# Patient Record
Sex: MALE | Race: BLACK OR AFRICAN AMERICAN | NOT HISPANIC OR LATINO | Employment: FULL TIME | URBAN - METROPOLITAN AREA
[De-identification: names, ages, dates, MRNs, and addresses within clinical notes are randomized per-mention and may not be internally consistent; named-entity substitution may affect disease eponyms.]

---

## 2017-04-10 ENCOUNTER — OFFICE VISIT (OUTPATIENT)
Dept: INTERNAL MEDICINE | Facility: CLINIC | Age: 52
End: 2017-04-10
Attending: FAMILY MEDICINE
Payer: COMMERCIAL

## 2017-04-10 ENCOUNTER — LAB VISIT (OUTPATIENT)
Dept: LAB | Facility: OTHER | Age: 52
End: 2017-04-10
Attending: FAMILY MEDICINE
Payer: COMMERCIAL

## 2017-04-10 VITALS
HEIGHT: 68 IN | SYSTOLIC BLOOD PRESSURE: 139 MMHG | DIASTOLIC BLOOD PRESSURE: 102 MMHG | BODY MASS INDEX: 35.59 KG/M2 | WEIGHT: 234.81 LBS | HEART RATE: 80 BPM

## 2017-04-10 DIAGNOSIS — E78.2 MIXED HYPERLIPIDEMIA: ICD-10-CM

## 2017-04-10 DIAGNOSIS — Z00.00 PREVENTATIVE HEALTH CARE: Primary | ICD-10-CM

## 2017-04-10 DIAGNOSIS — I10 HTN (HYPERTENSION), BENIGN: ICD-10-CM

## 2017-04-10 DIAGNOSIS — E11.9 TYPE 2 DIABETES MELLITUS WITHOUT COMPLICATION, WITHOUT LONG-TERM CURRENT USE OF INSULIN: ICD-10-CM

## 2017-04-10 LAB
ALBUMIN SERPL BCP-MCNC: 3.9 G/DL
ALP SERPL-CCNC: 77 U/L
ALT SERPL W/O P-5'-P-CCNC: 29 U/L
ANION GAP SERPL CALC-SCNC: 8 MMOL/L
AST SERPL-CCNC: 21 U/L
BILIRUB SERPL-MCNC: 0.7 MG/DL
BUN SERPL-MCNC: 10 MG/DL
CALCIUM SERPL-MCNC: 9.2 MG/DL
CHLORIDE SERPL-SCNC: 104 MMOL/L
CHOLEST/HDLC SERPL: 4.8 {RATIO}
CO2 SERPL-SCNC: 30 MMOL/L
CREAT SERPL-MCNC: 1.5 MG/DL
EST. GFR  (AFRICAN AMERICAN): >60 ML/MIN/1.73 M^2
EST. GFR  (NON AFRICAN AMERICAN): 54 ML/MIN/1.73 M^2
GLUCOSE SERPL-MCNC: 97 MG/DL
HDL/CHOLESTEROL RATIO: 20.7 %
HDLC SERPL-MCNC: 135 MG/DL
HDLC SERPL-MCNC: 28 MG/DL
LDLC SERPL CALC-MCNC: 59 MG/DL
NONHDLC SERPL-MCNC: 107 MG/DL
POTASSIUM SERPL-SCNC: 3.6 MMOL/L
PROT SERPL-MCNC: 7.6 G/DL
SODIUM SERPL-SCNC: 142 MMOL/L
TRIGL SERPL-MCNC: 240 MG/DL

## 2017-04-10 PROCEDURE — 36415 COLL VENOUS BLD VENIPUNCTURE: CPT

## 2017-04-10 PROCEDURE — 3080F DIAST BP >= 90 MM HG: CPT | Mod: S$GLB,,, | Performed by: FAMILY MEDICINE

## 2017-04-10 PROCEDURE — 99396 PREV VISIT EST AGE 40-64: CPT | Mod: S$GLB,,, | Performed by: FAMILY MEDICINE

## 2017-04-10 PROCEDURE — 80061 LIPID PANEL: CPT

## 2017-04-10 PROCEDURE — 83036 HEMOGLOBIN GLYCOSYLATED A1C: CPT

## 2017-04-10 PROCEDURE — 80053 COMPREHEN METABOLIC PANEL: CPT

## 2017-04-10 PROCEDURE — 99999 PR PBB SHADOW E&M-EST. PATIENT-LVL III: CPT | Mod: PBBFAC,,, | Performed by: FAMILY MEDICINE

## 2017-04-10 PROCEDURE — 3075F SYST BP GE 130 - 139MM HG: CPT | Mod: S$GLB,,, | Performed by: FAMILY MEDICINE

## 2017-04-10 RX ORDER — ATORVASTATIN CALCIUM 40 MG/1
40 TABLET, FILM COATED ORAL DAILY
Qty: 90 TABLET | Refills: 3 | Status: SHIPPED | OUTPATIENT
Start: 2017-04-10 | End: 2017-11-08 | Stop reason: SDUPTHER

## 2017-04-10 RX ORDER — ATORVASTATIN CALCIUM 40 MG/1
TABLET, FILM COATED ORAL
COMMUNITY
Start: 2017-03-13 | End: 2017-04-10 | Stop reason: SDUPTHER

## 2017-04-10 NOTE — PROGRESS NOTES
"CHIEF COMPLAINT: followup hypertension, diabetes, and hypercholesterolemia.     HISTORY OF PRESENT ILLNESS: The patient is a 51 year-old black male. He was seen 6 months ago.        He started on Lipitor and metformin which he is tolerating well.      Unfortunately, his blood pressure remained an issue.  He is on ramipril amlodipine and HCTZ.  Blood pressure is fairly well controlled today.      He was having an ACE inhibitor cough that resolved upon stopping the lisinopril.     REVIEW OF SYSTEMS:   GENERAL: No fever, chills, fatigability or weight loss.   SKIN: No rashes, itching or changes in color or texture of skin.   HEAD: No headaches or recent head trauma.   EYES: Visual acuity fine. No photophobia, ocular pain or diplopia.   EARS: Denies ear pain, discharge.   NOSE: No loss of smell, no epistaxis or postnasal drip.   MOUTH & THROAT: No hoarseness or change in voice. No excessive gum bleeding.   NODES: Denies swollen glands.   CHEST: Denies GUARDADO, cyanosis, wheezing, and sputum production.   CARDIOVASCULAR: Denies chest pain, PND, orthopnea or reduced exercise tolerance.   ABDOMEN: Appetite fine. No weight loss. Denies diarrhea, abdominal pain, hematemesis or blood in stool.   URINARY: No flank pain, dysuria or hematuria.   PERIPHERAL VASCULAR: No claudication or cyanosis.   MUSCULOSKELETAL: No joint stiffness or swelling. Denies back pain.   NEUROLOGIC: No history of seizures, paralysis, alteration of gait or coordination.     SOCIAL HISTORY: The patient does not smoke. The patient consumes alcohol socially. The patient is . 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     PHYSICAL EXAMINATION:   Blood pressure (!) 139/102, pulse 80, height 5' 8" (1.727 m), weight 106.5 kg (234 lb 12.6 oz).    APPEARANCE: Well nourished, well developed, in no acute distress.   HEAD: Normocephalic, atraumatic.   EYES: PERRL. EOMI. Conjunctivae without injection and anicteric   EARS: TM's intact. Light reflex normal. No " retraction or perforation.   NOSE: Mucosa pink. Airway clear.   MOUTH & THROAT: No tonsillar enlargement. No pharyngeal erythema or exudate. No stridor.   NECK: Supple.   NODES: No cervical, axillary or inguinal lymph node enlargement.   CHEST: Lungs clear to auscultation. No retractions are noted. No rales or rhonchi are present.   CARDIOVASCULAR: Normal S1, S2. No rubs, murmurs or gallops.   ABDOMEN: Bowel sounds normal. Not distended. Soft. No tenderness or masses. No ascites is noted.   MUSCULOSKELETAL: There is no clubbing, cyanosis, or edema of the extremities x4. There is full range of motion of the lumbar spine. There is full range of motion of the extremities x4. There is no deformity noted.   NEUROLOGIC:   Normal speech development.   Hearing normal.   Normal gait.   Motor and sensory exams grossly normal.   DTR's normal.   PSYCHIATRIC: Patient is alert and oriented x3. Thought processes are all normal. There is no homicidality. There is no suicidality. There is no evidence of psychosis.   Protective Sensation (w/ 10 gram monofilament):  Right: Intact  Left: Intact    Visual Inspection:  Normal -  Bilateral    Pedal Pulses:   Right: Present  Left: Present    Posterior tibialis:   Right:Present  Left: Present        LABORATORY/RADIOLOGY: Chart reviewed, including ER notes and laboratory values.     ASSESSMENT:   Hypertension now adequately controlled    Type 2 diabetes, condition is well controlled on current medication regimen   Hyperlipidemia, condition is well controlled on current medication regimen  Suspect ACE inhibitor cough   Leg cramps and hypokalemia     PLAN:   Continue current antihypertensives regimen.     Continue losartan 100 mg by mouth daily      I am going to see him back in 6 months with BW prior

## 2017-04-10 NOTE — MR AVS SNAPSHOT
[facility]  [address]  [facility] [address]  Phone: [phone]  Fax: [phone]                  Meliton Tyson   4/10/2017 4:00 PM   Office Visit    Description:  Male : 1965   Provider:  Rodrick Angulo MD   Department:  [facility]           Reason for Visit     Hypertension           Diagnoses this Visit        Comments    Preventative health care    -  Primary     HTN (hypertension), benign         Type 2 diabetes mellitus without complication, without long-term current use of insulin         Mixed hyperlipidemia                To Do List           Future Appointments        Provider Department Dept Phone    4/10/2017 2:45 PM LAB, SAME DAY [facility] [phone]    4/10/2017 4:00 PM Rodrick Angulo MD [facility] [phone]      Goals (5 Years of Data)     None       These Medications        Disp Refills Start End    atorvastatin (LIPITOR) 40 MG tablet 90 tablet 3 4/10/2017     Take 1 tablet (40 mg total) by mouth once daily. - Oral    Pharmacy: [id number] [place] - [address] Ph #: [phone]         [facility]     [facility] Nurse Care Line -  Assistance  Unless otherwise directed by your provider, please contact [facility], our nurse care line that is available for  assistance.     Registered nurses in the [facility] provide: appointment scheduling, clinical advisement, health education, and other advisory services.  Call: [phone] (toll free)               Medications           Message regarding Medications     Verify the changes and/or additions to your medication regime listed below are the same as discussed with your clinician today.  If any of these changes or additions are incorrect, please notify your healthcare provider.        CHANGE how you are taking these medications     Start Taking  "Instead of    atorvastatin (LIPITOR) 40 MG tablet atorvastatin (LIPITOR) 40 MG tablet    Dosage:  Take 1 tablet (40 mg total) by mouth once daily.     Reason for Change:  Reorder       STOP taking these medications     promethazine-dextromethorphan (PROMETHAZINE-DM) 6.25-15 mg/5 mL Syrp Take 5 mLs by mouth once daily.           Verify that the below list of medications is an accurate representation of the medications you are currently taking.  If none reported, the list may be blank. If incorrect, please contact your healthcare provider. Carry this list with you in case of emergency.           Current Medications     amlodipine (NORVASC) 10 MG tablet Take 1 tablet (10 mg total) by mouth once daily.    atorvastatin (LIPITOR) 40 MG tablet Take 1 tablet (40 mg total) by mouth once daily.    hydrochlorothiazide (HYDRODIURIL) 25 MG tablet Take 1 tablet (25 mg total) by mouth once daily.    losartan (COZAAR) 100 MG tablet Take 1 tablet (100 mg total) by mouth once daily.    metformin (GLUCOPHAGE) 500 MG tablet Take 1 tablet (500 mg total) by mouth 2 (two) times daily with meals.    tadalafil (CIALIS) 20 MG Tab Use one tablet every 36 hours           Clinical Reference Information           Your Vitals Were     BP Pulse Height Weight BMI    139/102 80 5' 8" (1.727 m) 106.5 kg (234 lb 12.6 oz) 35.7 kg/m2      Blood Pressure          Most Recent Value    BP  (!)  139/102      Allergies as of 4/10/2017     No Known Allergies      Immunizations Administered on Date of Encounter - 4/10/2017     None      Orders Placed During Today's Visit     Future Labs/Procedures Expected by Expires    Comprehensive metabolic panel  4/10/2017 6/9/2017    Hemoglobin A1c  4/10/2017 7/9/2017    Lipid panel  4/10/2017 6/9/2017      Instructions    Your test results will be communicated to you via: [facility], Telephone or Letter.  If you have not received your test results within one week. Please contact the clinic.           Language Assistance " Services     ATTENTION: Language assistance services are available, free of charge. Please call [phone].      ATENCIÓN: Si habla moo, tiene a hinojosa disposición servicios gratuitos de asistencia lingüística. Llame al [phone].     CHÚ Ý: N?u b?n nói Ti?ng Vi?t, có các d?ch v? h? tr? ngôn ng? mi?n phí dành cho b?n. G?i s? [phone].         [facility] complies with applicable Federal civil rights laws and does not discriminate on the basis of race, color, national origin, age, disability, or sex.

## 2017-04-11 LAB
ESTIMATED AVG GLUCOSE: 180 MG/DL
HBA1C MFR BLD HPLC: 7.9 %

## 2017-04-12 ENCOUNTER — TELEPHONE (OUTPATIENT)
Dept: INTERNAL MEDICINE | Facility: CLINIC | Age: 52
End: 2017-04-12

## 2017-04-12 NOTE — TELEPHONE ENCOUNTER
----- Message from Rodrick Angulo MD sent at 4/12/2017 12:35 PM CDT -----  A1c remains good.  Cholesterol is very good.  No changes in medications.  Followup as scheduled.

## 2017-09-08 DIAGNOSIS — Z12.11 COLON CANCER SCREENING: ICD-10-CM

## 2017-09-08 RX ORDER — METFORMIN HYDROCHLORIDE 500 MG/1
TABLET ORAL
Qty: 60 TABLET | Refills: 0 | Status: SHIPPED | OUTPATIENT
Start: 2017-09-08 | End: 2017-11-08 | Stop reason: SDUPTHER

## 2017-09-08 NOTE — TELEPHONE ENCOUNTER
----- Message from Elaine Pool sent at 9/8/2017 10:32 AM CDT -----  Contact: Patient himself  _  1st Request  X  2nd Request  _  3rd Request    Please refill the medication(s) listed below. Please call the patient when the prescription(s) is ready for  at the phone number [phone] .    Medication #1  METFORMIN  500 mg    Preferred Pharmacy: [id number]  [place] - [address]# [phone]  /  Fax# [phone]

## 2017-10-06 DIAGNOSIS — E11.9 TYPE 2 DIABETES MELLITUS WITHOUT COMPLICATION: ICD-10-CM

## 2017-10-24 ENCOUNTER — TELEPHONE (OUTPATIENT)
Dept: INTERNAL MEDICINE | Facility: CLINIC | Age: 52
End: 2017-10-24

## 2017-10-24 DIAGNOSIS — E11.9 TYPE 2 DIABETES MELLITUS WITHOUT COMPLICATION, WITHOUT LONG-TERM CURRENT USE OF INSULIN: Primary | ICD-10-CM

## 2017-10-26 RX ORDER — HYDROCHLOROTHIAZIDE 25 MG/1
TABLET ORAL
Qty: 90 TABLET | Refills: 0 | Status: SHIPPED | OUTPATIENT
Start: 2017-10-26 | End: 2017-11-08 | Stop reason: SDUPTHER

## 2017-10-26 RX ORDER — AMLODIPINE BESYLATE 10 MG/1
TABLET ORAL
Qty: 90 TABLET | Refills: 0 | Status: SHIPPED | OUTPATIENT
Start: 2017-10-26 | End: 2017-11-08 | Stop reason: SDUPTHER

## 2017-11-08 ENCOUNTER — LAB VISIT (OUTPATIENT)
Dept: LAB | Facility: OTHER | Age: 52
End: 2017-11-08
Attending: FAMILY MEDICINE
Payer: COMMERCIAL

## 2017-11-08 ENCOUNTER — OFFICE VISIT (OUTPATIENT)
Dept: INTERNAL MEDICINE | Facility: CLINIC | Age: 52
End: 2017-11-08
Attending: FAMILY MEDICINE
Payer: COMMERCIAL

## 2017-11-08 VITALS
BODY MASS INDEX: 36.72 KG/M2 | HEART RATE: 93 BPM | WEIGHT: 242.31 LBS | DIASTOLIC BLOOD PRESSURE: 70 MMHG | HEIGHT: 68 IN | SYSTOLIC BLOOD PRESSURE: 130 MMHG

## 2017-11-08 DIAGNOSIS — E11.9 DIABETES MELLITUS WITHOUT COMPLICATION: Primary | ICD-10-CM

## 2017-11-08 DIAGNOSIS — Z12.11 SCREEN FOR COLON CANCER: ICD-10-CM

## 2017-11-08 DIAGNOSIS — R35.0 FREQUENT URINATION: ICD-10-CM

## 2017-11-08 DIAGNOSIS — E11.9 DIABETES MELLITUS WITHOUT COMPLICATION: ICD-10-CM

## 2017-11-08 DIAGNOSIS — E11.9 TYPE 2 DIABETES MELLITUS WITHOUT COMPLICATION: ICD-10-CM

## 2017-11-08 DIAGNOSIS — I10 HTN (HYPERTENSION), BENIGN: ICD-10-CM

## 2017-11-08 DIAGNOSIS — E78.2 MIXED HYPERLIPIDEMIA: ICD-10-CM

## 2017-11-08 LAB
ESTIMATED AVG GLUCOSE: 280 MG/DL
HBA1C MFR BLD HPLC: 11.4 %

## 2017-11-08 PROCEDURE — 99214 OFFICE O/P EST MOD 30 MIN: CPT | Mod: S$GLB,,, | Performed by: FAMILY MEDICINE

## 2017-11-08 PROCEDURE — 83036 HEMOGLOBIN GLYCOSYLATED A1C: CPT

## 2017-11-08 PROCEDURE — 99999 PR PBB SHADOW E&M-EST. PATIENT-LVL III: CPT | Mod: PBBFAC,,, | Performed by: FAMILY MEDICINE

## 2017-11-08 PROCEDURE — 36415 COLL VENOUS BLD VENIPUNCTURE: CPT

## 2017-11-08 RX ORDER — ATORVASTATIN CALCIUM 40 MG/1
40 TABLET, FILM COATED ORAL DAILY
Qty: 90 TABLET | Refills: 1 | Status: SHIPPED | OUTPATIENT
Start: 2017-11-08 | End: 2018-06-04 | Stop reason: SDUPTHER

## 2017-11-08 RX ORDER — METFORMIN HYDROCHLORIDE 500 MG/1
500 TABLET ORAL 2 TIMES DAILY WITH MEALS
Qty: 180 TABLET | Refills: 1 | Status: SHIPPED | OUTPATIENT
Start: 2017-11-08 | End: 2019-02-02 | Stop reason: SDUPTHER

## 2017-11-08 RX ORDER — AMLODIPINE BESYLATE 10 MG/1
10 TABLET ORAL DAILY
Qty: 90 TABLET | Refills: 1 | Status: SHIPPED | OUTPATIENT
Start: 2017-11-08 | End: 2018-09-27 | Stop reason: SDUPTHER

## 2017-11-08 RX ORDER — HYDROCHLOROTHIAZIDE 25 MG/1
25 TABLET ORAL DAILY
Qty: 90 TABLET | Refills: 1 | Status: SHIPPED | OUTPATIENT
Start: 2017-11-08 | End: 2019-07-09 | Stop reason: SDUPTHER

## 2017-11-08 NOTE — PROGRESS NOTES
CHIEF COMPLAINT: followup hypertension, diabetes, and hypercholesterolemia.     HISTORY OF PRESENT ILLNESS: The patient is a 52 year-old black male. He was seen 6 months ago.    Interestingly, he has recently begun drinking between 1-1/2 and 2 gallons of water a day.  Needless to say his frequency of urination has increased.  He is also getting up several times at night to urinate.  He says that his volume of urine per [place] is about the same.  He does not have any urgency.  He does not have any hesitancy.  It sounds like he is simply drinking too much water.  He is going to reduce his water consumption and see if this doesn't help        He started on Lipitor and metformin which he is tolerating well.      He is on ramipril amlodipine and HCTZ.  Blood pressure is well controlled today.      REVIEW OF SYSTEMS:   GENERAL: No fever, chills, fatigability or weight loss.   SKIN: No rashes, itching or changes in color or texture of skin.   HEAD: No headaches or recent head trauma.   EYES: Visual acuity fine. No photophobia, ocular pain or diplopia.   EARS: Denies ear pain, discharge.   NOSE: No loss of smell, no epistaxis or postnasal drip.   MOUTH & THROAT: No hoarseness or change in voice. No excessive gum bleeding.   NODES: Denies swollen glands.   CHEST: Denies GUARDADO, cyanosis, wheezing, and sputum production.   CARDIOVASCULAR: Denies chest pain, PND, orthopnea or reduced exercise tolerance.   ABDOMEN: Appetite fine. No weight loss. Denies diarrhea, abdominal pain, hematemesis or blood in stool.   URINARY: No flank pain, dysuria or hematuria.   PERIPHERAL VASCULAR: No claudication or cyanosis.   MUSCULOSKELETAL: No joint stiffness or swelling. Denies back pain.   NEUROLOGIC: No history of seizures, paralysis, alteration of gait or coordination.     SOCIAL HISTORY: The patient does not smoke. The patient consumes alcohol socially. The patient is . 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     PHYSICAL  "EXAMINATION:   Blood pressure 130/70, pulse 93, height 5' 8" (1.727 m), weight 109.9 kg (242 lb 4.6 oz).    APPEARANCE: Well nourished, well developed, in no acute distress.   HEAD: Normocephalic, atraumatic.   EYES: PERRL. EOMI. Conjunctivae without injection and anicteric   EARS: TM's intact. Light reflex normal. No retraction or perforation.   NOSE: Mucosa pink. Airway clear.   MOUTH & THROAT: No tonsillar enlargement. No pharyngeal erythema or exudate. No stridor.   NECK: Supple.   NODES: No cervical, axillary or inguinal lymph node enlargement.   CHEST: Lungs clear to auscultation. No retractions are noted. No rales or rhonchi are present.   CARDIOVASCULAR: Normal S1, S2. No rubs, murmurs or gallops.   ABDOMEN: Bowel sounds normal. Not distended. Soft. No tenderness or masses. No ascites is noted.   MUSCULOSKELETAL: There is no clubbing, cyanosis, or edema of the extremities x4. There is full range of motion of the lumbar spine. There is full range of motion of the extremities x4. There is no deformity noted.   NEUROLOGIC:   Normal speech development.   Hearing normal.   Normal gait.   Motor and sensory exams grossly normal.   DTR's normal.   PSYCHIATRIC: Patient is alert and oriented x3. Thought processes are all normal. There is no homicidality. There is no suicidality. There is no evidence of psychosis.   Protective Sensation (w/ 10 gram monofilament):  Right: Intact  Left: Intact    Visual Inspection:  Normal -  Bilateral    Pedal Pulses:   Right: Present  Left: Present    Posterior tibialis:   Right:Present  Left: Present        LABORATORY/RADIOLOGY: Chart reviewed, including ER notes and laboratory values.     ASSESSMENT:   Frequent urination likely due to overly vigorous fluid intake   Hypertension now adequately controlled    Type 2 diabetes, condition is well controlled on current medication regimen   Hyperlipidemia, condition is well controlled on current medication regimen  ACE inhibitor cough "     PLAN:    Reduce fluid intake to approximately 1 gallon per day   Follow-up A1c     Continue current antihypertensives regimen.     Continue losartan 100 mg by mouth daily      I am going to see him back in 6 months with BW prior

## 2017-11-10 DIAGNOSIS — E11.9 TYPE 2 DIABETES MELLITUS WITHOUT COMPLICATION: ICD-10-CM

## 2017-11-16 ENCOUNTER — TELEPHONE (OUTPATIENT)
Dept: INTERNAL MEDICINE | Facility: CLINIC | Age: 52
End: 2017-11-16

## 2017-11-16 NOTE — TELEPHONE ENCOUNTER
----- Message from Rodrick Angulo MD sent at 11/10/2017 12:19 PM CST -----  A1c has increased to 11.4.  Emphasize medication and dietary adherence.  No changes in medications.  Followup as scheduled in 6 months.

## 2017-11-16 NOTE — TELEPHONE ENCOUNTER
Pt has been informed of results and advice.  States verbal understanding. Patient has no further questions or concerns.

## 2017-11-27 RX ORDER — LOSARTAN POTASSIUM 100 MG/1
TABLET ORAL
Qty: 90 TABLET | Refills: 3 | Status: SHIPPED | OUTPATIENT
Start: 2017-11-27 | End: 2019-07-09 | Stop reason: SDUPTHER

## 2018-01-10 ENCOUNTER — TELEPHONE (OUTPATIENT)
Dept: INTERNAL MEDICINE | Facility: CLINIC | Age: 53
End: 2018-01-10

## 2018-01-10 NOTE — TELEPHONE ENCOUNTER
----- Message from Elaine Pool sent at 1/10/2018 12:10 PM CST -----  Contact: Patient himself  X 1st Request  _  2nd Request  _  3rd Request    Who: Meliton Tyson (mrn# [id number])    Why: Patient called requesting to have medication TAMIFLU and cough syrup called into his local pharmacy for the flu.  Please give a call back call back at your earliest convenience.    THANKS!     What Number to Call Back:  [phone]    When to Expect a call back: (Before the end of the day)   -- if the call is after 12:00, the call back will be tomorrow.

## 2018-01-10 NOTE — TELEPHONE ENCOUNTER
I spoke and advised that he would need an appointment to be tested for the flu symptoms. Pt was advised that our department did not any open slots. Pt was given the number to primary care on [address]. Pt expressed verbal understandings. CF

## 2018-01-10 NOTE — TELEPHONE ENCOUNTER
----- Message from Sonia Oleary sent at 1/10/2018  1:32 PM CST -----  Contact: self  x_  1st Request  _  2nd Request  _  3rd Request    Who:pt    Why: pt returning call...please advise    [phone]    When to Expect a call back:   (Before the end of the day)   -- if call after 3:00 call back will be tomorrow.

## 2018-03-16 DIAGNOSIS — E11.8 TYPE 2 DIABETES MELLITUS WITH COMPLICATION, UNSPECIFIED LONG TERM INSULIN USE STATUS: Primary | ICD-10-CM

## 2018-03-28 ENCOUNTER — PATIENT MESSAGE (OUTPATIENT)
Dept: DIABETES | Facility: OTHER | Age: 53
End: 2018-03-28

## 2018-04-03 ENCOUNTER — PATIENT OUTREACH (OUTPATIENT)
Dept: INTERNAL MEDICINE | Facility: CLINIC | Age: 53
End: 2018-04-03

## 2018-04-03 ENCOUNTER — TELEPHONE (OUTPATIENT)
Dept: INTERNAL MEDICINE | Facility: CLINIC | Age: 53
End: 2018-04-03

## 2018-04-03 DIAGNOSIS — E11.9 TYPE 2 DIABETES MELLITUS WITHOUT COMPLICATION, WITHOUT LONG-TERM CURRENT USE OF INSULIN: Primary | ICD-10-CM

## 2018-04-03 NOTE — PROGRESS NOTES
Dear Meliton:    [facility] wants to help patients achieve their health goals. Our records show that you may have been told you have diabetes in the past. Diabetes is a medical condition that needs to be managed all the time to reduce your risk of things like heart, kidney and eye disease. Your [facility] primary care team wants to be sure you get important tests and screenings done regularly to assure that your health needs are met.    We have put a new system in place, called CareTouch that will help us improve how we monitor and reach out to you about tests and screenings that you may need to help manage your diabetes. Our records indicate that you may be due for the following:    Hemoglobin A1c    There are no preventive care reminders to display for this patient.  We have ordered these tests for you and would like you to come in to one of the following labs in your area any weekday between 10:30 am and 4:00 pm to have your tests done. Tell the  you received a CareTouch letter, or just look for the CareTouch sign.    If you've recently had these tests done outside [facility], or if the diagnosis of diabetes is in error, please let your physician know so that he/she can update your health record.    If you have questions or want to schedule your lab at another more convenient site, simply call [phone],  Monday through Friday 9am -4pm, to speak to a [facility] Coordinator.    Sincerely,      Rodrick Angulo MD and your [facility] Team

## 2018-04-20 DIAGNOSIS — E11.9 TYPE 2 DIABETES MELLITUS WITHOUT COMPLICATION: ICD-10-CM

## 2018-06-04 DIAGNOSIS — E78.2 MIXED HYPERLIPIDEMIA: ICD-10-CM

## 2018-06-04 RX ORDER — ATORVASTATIN CALCIUM 40 MG/1
TABLET, FILM COATED ORAL
Qty: 90 TABLET | Refills: 1 | Status: SHIPPED | OUTPATIENT
Start: 2018-06-04 | End: 2019-02-02 | Stop reason: SDUPTHER

## 2018-09-21 DIAGNOSIS — Z12.11 COLON CANCER SCREENING: ICD-10-CM

## 2018-09-28 RX ORDER — AMLODIPINE BESYLATE 10 MG/1
TABLET ORAL
Qty: 90 TABLET | Refills: 1 | Status: SHIPPED | OUTPATIENT
Start: 2018-09-28 | End: 2019-07-09 | Stop reason: SDUPTHER

## 2018-10-22 ENCOUNTER — PATIENT MESSAGE (OUTPATIENT)
Dept: DIABETES | Facility: OTHER | Age: 53
End: 2018-10-22

## 2018-12-04 ENCOUNTER — LAB VISIT (OUTPATIENT)
Dept: LAB | Facility: HOSPITAL | Age: 53
End: 2018-12-04
Attending: FAMILY MEDICINE
Payer: COMMERCIAL

## 2018-12-04 DIAGNOSIS — Z12.11 COLON CANCER SCREENING: ICD-10-CM

## 2018-12-04 LAB — HEMOCCULT STL QL IA: NEGATIVE

## 2018-12-04 PROCEDURE — 82274 ASSAY TEST FOR BLOOD FECAL: CPT

## 2018-12-11 ENCOUNTER — TELEPHONE (OUTPATIENT)
Dept: INTERNAL MEDICINE | Facility: CLINIC | Age: 53
End: 2018-12-11

## 2018-12-11 NOTE — TELEPHONE ENCOUNTER
Spoke to Mr. Tyson, and informed him that his fit kit results is negative.  Patient states understanding with no further questions or concerns.

## 2019-02-02 DIAGNOSIS — E78.2 MIXED HYPERLIPIDEMIA: ICD-10-CM

## 2019-02-04 RX ORDER — ATORVASTATIN CALCIUM 40 MG/1
TABLET, FILM COATED ORAL
Qty: 30 TABLET | Refills: 0 | Status: SHIPPED | OUTPATIENT
Start: 2019-02-04 | End: 2019-04-02 | Stop reason: SDUPTHER

## 2019-02-04 RX ORDER — METFORMIN HYDROCHLORIDE 500 MG/1
TABLET ORAL
Qty: 60 TABLET | Refills: 0 | Status: SHIPPED | OUTPATIENT
Start: 2019-02-04 | End: 2019-07-09 | Stop reason: SDUPTHER

## 2019-04-02 DIAGNOSIS — E78.2 MIXED HYPERLIPIDEMIA: ICD-10-CM

## 2019-04-02 RX ORDER — ATORVASTATIN CALCIUM 40 MG/1
TABLET, FILM COATED ORAL
Qty: 30 TABLET | Refills: 0 | Status: SHIPPED | OUTPATIENT
Start: 2019-04-02 | End: 2019-07-02 | Stop reason: SDUPTHER

## 2019-07-02 DIAGNOSIS — E11.9 TYPE 2 DIABETES MELLITUS WITHOUT COMPLICATION, WITHOUT LONG-TERM CURRENT USE OF INSULIN: Primary | ICD-10-CM

## 2019-07-02 DIAGNOSIS — E78.2 MIXED HYPERLIPIDEMIA: ICD-10-CM

## 2019-07-02 RX ORDER — METFORMIN HYDROCHLORIDE 500 MG/1
TABLET ORAL
Qty: 60 TABLET | Refills: 0 | OUTPATIENT
Start: 2019-07-02

## 2019-07-02 RX ORDER — ATORVASTATIN CALCIUM 40 MG/1
TABLET, FILM COATED ORAL
Qty: 30 TABLET | Refills: 5 | Status: SHIPPED | OUTPATIENT
Start: 2019-07-02 | End: 2019-07-09 | Stop reason: SDUPTHER

## 2019-07-02 NOTE — TELEPHONE ENCOUNTER
Pt schedule for appt on 07/09 for annual and refill request.Pt has not been seen since 2017.Pt agrees and verbalize

## 2019-07-09 ENCOUNTER — LAB VISIT (OUTPATIENT)
Dept: LAB | Facility: OTHER | Age: 54
End: 2019-07-09
Attending: FAMILY MEDICINE
Payer: COMMERCIAL

## 2019-07-09 ENCOUNTER — OFFICE VISIT (OUTPATIENT)
Dept: INTERNAL MEDICINE | Facility: CLINIC | Age: 54
End: 2019-07-09
Attending: FAMILY MEDICINE
Payer: COMMERCIAL

## 2019-07-09 VITALS
OXYGEN SATURATION: 96 % | WEIGHT: 233.69 LBS | BODY MASS INDEX: 36.68 KG/M2 | HEIGHT: 67 IN | SYSTOLIC BLOOD PRESSURE: 142 MMHG | DIASTOLIC BLOOD PRESSURE: 110 MMHG | HEART RATE: 86 BPM

## 2019-07-09 DIAGNOSIS — E11.9 TYPE 2 DIABETES MELLITUS WITHOUT COMPLICATION, WITHOUT LONG-TERM CURRENT USE OF INSULIN: ICD-10-CM

## 2019-07-09 DIAGNOSIS — I10 HTN (HYPERTENSION), BENIGN: ICD-10-CM

## 2019-07-09 DIAGNOSIS — E78.2 MIXED HYPERLIPIDEMIA: ICD-10-CM

## 2019-07-09 DIAGNOSIS — Z00.00 PREVENTATIVE HEALTH CARE: ICD-10-CM

## 2019-07-09 DIAGNOSIS — Z00.00 PREVENTATIVE HEALTH CARE: Primary | ICD-10-CM

## 2019-07-09 LAB
ALBUMIN SERPL BCP-MCNC: 4.1 G/DL (ref 3.5–5.2)
ALP SERPL-CCNC: 155 U/L (ref 55–135)
ALT SERPL W/O P-5'-P-CCNC: 29 U/L (ref 10–44)
ANION GAP SERPL CALC-SCNC: 10 MMOL/L (ref 8–16)
AST SERPL-CCNC: 20 U/L (ref 10–40)
BASOPHILS # BLD AUTO: 0.01 K/UL (ref 0–0.2)
BASOPHILS NFR BLD: 0.2 % (ref 0–1.9)
BILIRUB SERPL-MCNC: 0.6 MG/DL (ref 0.1–1)
BUN SERPL-MCNC: 12 MG/DL (ref 6–20)
CALCIUM SERPL-MCNC: 9.7 MG/DL (ref 8.7–10.5)
CHLORIDE SERPL-SCNC: 101 MMOL/L (ref 95–110)
CHOLEST SERPL-MCNC: 150 MG/DL (ref 120–199)
CHOLEST/HDLC SERPL: 5.8 {RATIO} (ref 2–5)
CO2 SERPL-SCNC: 27 MMOL/L (ref 23–29)
CREAT SERPL-MCNC: 1.3 MG/DL (ref 0.5–1.4)
DIFFERENTIAL METHOD: ABNORMAL
EOSINOPHIL # BLD AUTO: 0.2 K/UL (ref 0–0.5)
EOSINOPHIL NFR BLD: 3.3 % (ref 0–8)
ERYTHROCYTE [DISTWIDTH] IN BLOOD BY AUTOMATED COUNT: 13.5 % (ref 11.5–14.5)
EST. GFR  (AFRICAN AMERICAN): >60 ML/MIN/1.73 M^2
EST. GFR  (NON AFRICAN AMERICAN): >60 ML/MIN/1.73 M^2
ESTIMATED AVG GLUCOSE: 312 MG/DL (ref 68–131)
GLUCOSE SERPL-MCNC: 281 MG/DL (ref 70–110)
HBA1C MFR BLD HPLC: 12.5 % (ref 4–5.6)
HCT VFR BLD AUTO: 43.6 % (ref 40–54)
HDLC SERPL-MCNC: 26 MG/DL (ref 40–75)
HDLC SERPL: 17.3 % (ref 20–50)
HGB BLD-MCNC: 14.8 G/DL (ref 14–18)
LDLC SERPL CALC-MCNC: ABNORMAL MG/DL (ref 63–159)
LYMPHOCYTES # BLD AUTO: 2.4 K/UL (ref 1–4.8)
LYMPHOCYTES NFR BLD: 41.2 % (ref 18–48)
MCH RBC QN AUTO: 27 PG (ref 27–31)
MCHC RBC AUTO-ENTMCNC: 33.9 G/DL (ref 32–36)
MCV RBC AUTO: 80 FL (ref 82–98)
MONOCYTES # BLD AUTO: 0.4 K/UL (ref 0.3–1)
MONOCYTES NFR BLD: 6.2 % (ref 4–15)
NEUTROPHILS # BLD AUTO: 2.8 K/UL (ref 1.8–7.7)
NEUTROPHILS NFR BLD: 49.1 % (ref 38–73)
NONHDLC SERPL-MCNC: 124 MG/DL
PLATELET # BLD AUTO: 244 K/UL (ref 150–350)
PMV BLD AUTO: 10.6 FL (ref 9.2–12.9)
POTASSIUM SERPL-SCNC: 3.8 MMOL/L (ref 3.5–5.1)
PROT SERPL-MCNC: 8 G/DL (ref 6–8.4)
RBC # BLD AUTO: 5.48 M/UL (ref 4.6–6.2)
SODIUM SERPL-SCNC: 138 MMOL/L (ref 136–145)
TRIGL SERPL-MCNC: 582 MG/DL (ref 30–150)
TSH SERPL DL<=0.005 MIU/L-ACNC: 1.4 UIU/ML (ref 0.4–4)
WBC # BLD AUTO: 5.77 K/UL (ref 3.9–12.7)

## 2019-07-09 PROCEDURE — 99396 PR PREVENTIVE VISIT,EST,40-64: ICD-10-PCS | Mod: S$GLB,,, | Performed by: FAMILY MEDICINE

## 2019-07-09 PROCEDURE — 80061 LIPID PANEL: CPT

## 2019-07-09 PROCEDURE — 3046F PR MOST RECENT HEMOGLOBIN A1C LEVEL > 9.0%: ICD-10-PCS | Mod: CPTII,S$GLB,, | Performed by: FAMILY MEDICINE

## 2019-07-09 PROCEDURE — 3077F SYST BP >= 140 MM HG: CPT | Mod: CPTII,S$GLB,, | Performed by: FAMILY MEDICINE

## 2019-07-09 PROCEDURE — 3077F PR MOST RECENT SYSTOLIC BLOOD PRESSURE >= 140 MM HG: ICD-10-PCS | Mod: CPTII,S$GLB,, | Performed by: FAMILY MEDICINE

## 2019-07-09 PROCEDURE — 85025 COMPLETE CBC W/AUTO DIFF WBC: CPT

## 2019-07-09 PROCEDURE — 36415 COLL VENOUS BLD VENIPUNCTURE: CPT

## 2019-07-09 PROCEDURE — 99396 PREV VISIT EST AGE 40-64: CPT | Mod: S$GLB,,, | Performed by: FAMILY MEDICINE

## 2019-07-09 PROCEDURE — 3080F PR MOST RECENT DIASTOLIC BLOOD PRESSURE >= 90 MM HG: ICD-10-PCS | Mod: CPTII,S$GLB,, | Performed by: FAMILY MEDICINE

## 2019-07-09 PROCEDURE — 3080F DIAST BP >= 90 MM HG: CPT | Mod: CPTII,S$GLB,, | Performed by: FAMILY MEDICINE

## 2019-07-09 PROCEDURE — 80053 COMPREHEN METABOLIC PANEL: CPT

## 2019-07-09 PROCEDURE — 84443 ASSAY THYROID STIM HORMONE: CPT

## 2019-07-09 PROCEDURE — 99999 PR PBB SHADOW E&M-EST. PATIENT-LVL III: ICD-10-PCS | Mod: PBBFAC,,, | Performed by: FAMILY MEDICINE

## 2019-07-09 PROCEDURE — 99999 PR PBB SHADOW E&M-EST. PATIENT-LVL III: CPT | Mod: PBBFAC,,, | Performed by: FAMILY MEDICINE

## 2019-07-09 PROCEDURE — 83036 HEMOGLOBIN GLYCOSYLATED A1C: CPT

## 2019-07-09 PROCEDURE — 3046F HEMOGLOBIN A1C LEVEL >9.0%: CPT | Mod: CPTII,S$GLB,, | Performed by: FAMILY MEDICINE

## 2019-07-09 RX ORDER — METFORMIN HYDROCHLORIDE 500 MG/1
500 TABLET ORAL 2 TIMES DAILY WITH MEALS
Qty: 60 TABLET | Refills: 11 | Status: SHIPPED | OUTPATIENT
Start: 2019-07-09 | End: 2021-07-01

## 2019-07-09 RX ORDER — AMLODIPINE BESYLATE 10 MG/1
10 TABLET ORAL DAILY
Qty: 30 TABLET | Refills: 11 | Status: ON HOLD | OUTPATIENT
Start: 2019-07-09 | End: 2021-06-09 | Stop reason: HOSPADM

## 2019-07-09 RX ORDER — HYDROCHLOROTHIAZIDE 25 MG/1
25 TABLET ORAL DAILY
Qty: 30 TABLET | Refills: 5 | Status: ON HOLD | OUTPATIENT
Start: 2019-07-09 | End: 2021-06-09 | Stop reason: HOSPADM

## 2019-07-09 RX ORDER — ATORVASTATIN CALCIUM 40 MG/1
40 TABLET, FILM COATED ORAL DAILY
Qty: 30 TABLET | Refills: 5 | Status: ON HOLD | OUTPATIENT
Start: 2019-07-09 | End: 2021-06-09 | Stop reason: HOSPADM

## 2019-07-09 RX ORDER — LOSARTAN POTASSIUM 100 MG/1
100 TABLET ORAL DAILY
Qty: 30 TABLET | Refills: 11 | Status: ON HOLD | OUTPATIENT
Start: 2019-07-09 | End: 2021-06-09 | Stop reason: HOSPADM

## 2019-07-09 NOTE — PROGRESS NOTES
"CHIEF COMPLAINT: followup hypertension, diabetes, and hypercholesterolemia.     HISTORY OF PRESENT ILLNESS: The patient is a 54 year-old black male. He was seen 2 years ago.    He is on Lipitor and metformin which he is tolerating well.      He is on ramipril amlodipine and HCTZ.  Blood pressure is well controlled today.      REVIEW OF SYSTEMS:   GENERAL: No fever, chills, fatigability or weight loss.   SKIN: No rashes, itching or changes in color or texture of skin.   HEAD: No headaches or recent head trauma.   EYES: Visual acuity fine. No photophobia, ocular pain or diplopia.   EARS: Denies ear pain, discharge.   NOSE: No loss of smell, no epistaxis or postnasal drip.   MOUTH & THROAT: No hoarseness or change in voice. No excessive gum bleeding.   NODES: Denies swollen glands.   CHEST: Denies GUARDADO, cyanosis, wheezing, and sputum production.   CARDIOVASCULAR: Denies chest pain, PND, orthopnea or reduced exercise tolerance.   ABDOMEN: Appetite fine. No weight loss. Denies diarrhea, abdominal pain, hematemesis or blood in stool.   URINARY: No flank pain, dysuria or hematuria.   PERIPHERAL VASCULAR: No claudication or cyanosis.   MUSCULOSKELETAL: No joint stiffness or swelling. Denies back pain.   NEUROLOGIC: No history of seizures, paralysis, alteration of gait or coordination.     SOCIAL HISTORY: The patient does not smoke. The patient consumes alcohol socially. The patient is . 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     PHYSICAL EXAMINATION:   Blood pressure (!) 142/110, pulse 86, height 5' 7" (1.702 m), weight 106 kg (233 lb 11 oz), SpO2 96 %.    APPEARANCE: Well nourished, well developed, in no acute distress.   HEAD: Normocephalic, atraumatic.   EYES: PERRL. EOMI. Conjunctivae without injection and anicteric   EARS: TM's intact. Light reflex normal. No retraction or perforation.   NOSE: Mucosa pink. Airway clear.   MOUTH & THROAT: No tonsillar enlargement. No pharyngeal erythema or exudate. No stridor. "   NECK: Supple.   NODES: No cervical, axillary or inguinal lymph node enlargement.   CHEST: Lungs clear to auscultation. No retractions are noted. No rales or rhonchi are present.   CARDIOVASCULAR: Normal S1, S2. No rubs, murmurs or gallops.   ABDOMEN: Bowel sounds normal. Not distended. Soft. No tenderness or masses. No ascites is noted.   MUSCULOSKELETAL: There is no clubbing, cyanosis, or edema of the extremities x4. There is full range of motion of the lumbar spine. There is full range of motion of the extremities x4. There is no deformity noted.   NEUROLOGIC:   Normal speech development.   Hearing normal.   Normal gait.   Motor and sensory exams grossly normal.   PSYCHIATRIC: Patient is alert and oriented x3. Thought processes are all normal. There is no homicidality. There is no suicidality. There is no evidence of psychosis.   Protective Sensation (w/ 10 gram monofilament):  Right: Intact  Left: Intact    Visual Inspection:  Normal -  Bilateral    Pedal Pulses:   Right: Present  Left: Present    Posterior tibialis:   Right:Present  Left: Present        LABORATORY/RADIOLOGY: Chart reviewed, including notes and laboratory values.     ASSESSMENT:   Annual   Hypertension not adequately controlled    Type 2 diabetes, condition is not well controlled on current medication regimen   Hyperlipidemia, condition is well controlled on current medication regimen    PLAN:    We will follow-up blood work which we expect to demonstrate an elevated A1c.  Continue current antihypertensives regimen.     Continue losartan 100 mg by mouth daily    Return to clinic in 3 months

## 2019-07-10 ENCOUNTER — TELEPHONE (OUTPATIENT)
Dept: INTERNAL MEDICINE | Facility: CLINIC | Age: 54
End: 2019-07-10

## 2019-07-10 DIAGNOSIS — E11.9 TYPE 2 DIABETES MELLITUS WITHOUT COMPLICATION, WITHOUT LONG-TERM CURRENT USE OF INSULIN: Primary | ICD-10-CM

## 2019-07-10 NOTE — TELEPHONE ENCOUNTER
----- Message from Rodrick Angulo MD sent at 7/10/2019 11:23 AM CDT -----  A1c is quite elevated.  He might benefit from diabetic education.  Cholesterol is good.  No changes in medications.  Followup as scheduled in 3 months with an A1c a couple of days prior.

## 2019-07-11 NOTE — TELEPHONE ENCOUNTER
----- Message from Gianna Arnold sent at 7/11/2019  2:46 PM CDT -----  Contact: pt   Type:  Patient Returning Call    Who Called: MIREYA GREEN [[id number]]    Who Left Message for Patient: Christie Dale MA     Does the patient know what this is regarding?: yes     Best Call Back Number:[phone].    Additional Information:

## 2019-09-25 ENCOUNTER — PATIENT OUTREACH (OUTPATIENT)
Dept: ADMINISTRATIVE | Facility: HOSPITAL | Age: 54
End: 2019-09-25

## 2019-09-25 DIAGNOSIS — E11.9 TYPE 2 DIABETES MELLITUS WITHOUT COMPLICATION, WITHOUT LONG-TERM CURRENT USE OF INSULIN: Primary | ICD-10-CM

## 2019-11-04 ENCOUNTER — LAB VISIT (OUTPATIENT)
Dept: LAB | Facility: OTHER | Age: 54
End: 2019-11-04
Attending: FAMILY MEDICINE
Payer: COMMERCIAL

## 2019-11-04 ENCOUNTER — PATIENT MESSAGE (OUTPATIENT)
Dept: ADMINISTRATIVE | Facility: OTHER | Age: 54
End: 2019-11-04

## 2019-11-04 ENCOUNTER — OFFICE VISIT (OUTPATIENT)
Dept: INTERNAL MEDICINE | Facility: CLINIC | Age: 54
End: 2019-11-04
Attending: FAMILY MEDICINE
Payer: COMMERCIAL

## 2019-11-04 VITALS
HEART RATE: 101 BPM | WEIGHT: 233.69 LBS | BODY MASS INDEX: 36.68 KG/M2 | OXYGEN SATURATION: 98 % | SYSTOLIC BLOOD PRESSURE: 158 MMHG | HEIGHT: 67 IN | DIASTOLIC BLOOD PRESSURE: 98 MMHG

## 2019-11-04 DIAGNOSIS — E11.9 TYPE 2 DIABETES MELLITUS WITHOUT COMPLICATION, WITHOUT LONG-TERM CURRENT USE OF INSULIN: Primary | ICD-10-CM

## 2019-11-04 DIAGNOSIS — E11.9 TYPE 2 DIABETES MELLITUS WITHOUT COMPLICATION, WITHOUT LONG-TERM CURRENT USE OF INSULIN: ICD-10-CM

## 2019-11-04 DIAGNOSIS — I10 HTN (HYPERTENSION), BENIGN: ICD-10-CM

## 2019-11-04 LAB
ESTIMATED AVG GLUCOSE: 286 MG/DL (ref 68–131)
HBA1C MFR BLD HPLC: 11.6 % (ref 4–5.6)

## 2019-11-04 PROCEDURE — 3046F HEMOGLOBIN A1C LEVEL >9.0%: CPT | Mod: CPTII,S$GLB,, | Performed by: FAMILY MEDICINE

## 2019-11-04 PROCEDURE — 99999 PR PBB SHADOW E&M-EST. PATIENT-LVL III: ICD-10-PCS | Mod: PBBFAC,,, | Performed by: FAMILY MEDICINE

## 2019-11-04 PROCEDURE — 36415 COLL VENOUS BLD VENIPUNCTURE: CPT

## 2019-11-04 PROCEDURE — 3080F PR MOST RECENT DIASTOLIC BLOOD PRESSURE >= 90 MM HG: ICD-10-PCS | Mod: CPTII,S$GLB,, | Performed by: FAMILY MEDICINE

## 2019-11-04 PROCEDURE — 99214 OFFICE O/P EST MOD 30 MIN: CPT | Mod: S$GLB,,, | Performed by: FAMILY MEDICINE

## 2019-11-04 PROCEDURE — 3077F SYST BP >= 140 MM HG: CPT | Mod: CPTII,S$GLB,, | Performed by: FAMILY MEDICINE

## 2019-11-04 PROCEDURE — 3077F PR MOST RECENT SYSTOLIC BLOOD PRESSURE >= 140 MM HG: ICD-10-PCS | Mod: CPTII,S$GLB,, | Performed by: FAMILY MEDICINE

## 2019-11-04 PROCEDURE — 3080F DIAST BP >= 90 MM HG: CPT | Mod: CPTII,S$GLB,, | Performed by: FAMILY MEDICINE

## 2019-11-04 PROCEDURE — 99999 PR PBB SHADOW E&M-EST. PATIENT-LVL III: CPT | Mod: PBBFAC,,, | Performed by: FAMILY MEDICINE

## 2019-11-04 PROCEDURE — 99214 PR OFFICE/OUTPT VISIT, EST, LEVL IV, 30-39 MIN: ICD-10-PCS | Mod: S$GLB,,, | Performed by: FAMILY MEDICINE

## 2019-11-04 PROCEDURE — 3046F PR MOST RECENT HEMOGLOBIN A1C LEVEL > 9.0%: ICD-10-PCS | Mod: CPTII,S$GLB,, | Performed by: FAMILY MEDICINE

## 2019-11-04 PROCEDURE — 3008F PR BODY MASS INDEX (BMI) DOCUMENTED: ICD-10-PCS | Mod: CPTII,S$GLB,, | Performed by: FAMILY MEDICINE

## 2019-11-04 PROCEDURE — 3008F BODY MASS INDEX DOCD: CPT | Mod: CPTII,S$GLB,, | Performed by: FAMILY MEDICINE

## 2019-11-04 PROCEDURE — 83036 HEMOGLOBIN GLYCOSYLATED A1C: CPT

## 2019-11-04 NOTE — PROGRESS NOTES
"CHIEF COMPLAINT: followup hypertension, diabetes, and hypercholesterolemia.     HISTORY OF PRESENT ILLNESS: The patient is a 54 year-old black male. He was seen 4 months ago.    The patient has diabetes.  Recent blood sugars have been less than 200.  There have been no episodes of hypoglycemia.  Patient does routinely monitor their blood sugar.    He is on Lipitor and metformin which he is tolerating well.      He is on ramipril amlodipine and HCTZ.  Blood pressure is not well controlled today.      REVIEW OF SYSTEMS:   GENERAL: No fever, chills, fatigability or weight loss.   SKIN: No rashes, itching or changes in color or texture of skin.   HEAD: No headaches or recent head trauma.   EYES: Visual acuity fine. No photophobia, ocular pain or diplopia.   EARS: Denies ear pain, discharge.   NOSE: No loss of smell, no epistaxis or postnasal drip.   MOUTH & THROAT: No hoarseness or change in voice. No excessive gum bleeding.   NODES: Denies swollen glands.   CHEST: Denies GUARDADO, cyanosis, wheezing, and sputum production.   CARDIOVASCULAR: Denies chest pain, PND, orthopnea or reduced exercise tolerance.   ABDOMEN: Appetite fine. No weight loss. Denies diarrhea, abdominal pain, hematemesis or blood in stool.   URINARY: No flank pain, dysuria or hematuria.   PERIPHERAL VASCULAR: No claudication or cyanosis.   MUSCULOSKELETAL: No joint stiffness or swelling. Denies back pain.   NEUROLOGIC: No history of seizures, paralysis, alteration of gait or coordination.     SOCIAL HISTORY: The patient does not smoke. The patient consumes alcohol socially. The patient is . 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     PHYSICAL EXAMINATION:   Blood pressure (!) 158/98, pulse 101, height 5' 7" (1.702 m), weight 106 kg (233 lb 11 oz), SpO2 98 %.    APPEARANCE: Well nourished, well developed, in no acute distress.   HEAD: Normocephalic, atraumatic.   EYES: PERRL. EOMI. Conjunctivae without injection and anicteric   EARS: TM's intact. " Light reflex normal. No retraction or perforation.   NOSE: Mucosa pink. Airway clear.   MOUTH & THROAT: No tonsillar enlargement. No pharyngeal erythema or exudate. No stridor.   NECK: Supple.   NODES: No cervical, axillary or inguinal lymph node enlargement.   CHEST: Lungs clear to auscultation. No retractions are noted. No rales or rhonchi are present.   CARDIOVASCULAR: Normal S1, S2. No rubs, murmurs or gallops.   ABDOMEN: Bowel sounds normal. Not distended. Soft. No tenderness or masses. No ascites is noted.   MUSCULOSKELETAL: There is no clubbing, cyanosis, or edema of the extremities x4. There is full range of motion of the lumbar spine. There is full range of motion of the extremities x4. There is no deformity noted.   NEUROLOGIC:   Normal speech development.   Hearing normal.   Normal gait.   Motor and sensory exams grossly normal.   PSYCHIATRIC: Patient is alert and oriented x3. Thought processes are all normal. There is no homicidality. There is no suicidality. There is no evidence of psychosis.     LABORATORY/RADIOLOGY: Chart reviewed, including notes and laboratory values.     ASSESSMENT:   Hypertension not adequately controlled    Type 2 diabetes, condition is not well controlled on current medication regimen   Hyperlipidemia, condition is well controlled on current medication regimen    PLAN:    We will enroll him in digital HTN.  Continue current antihypertensives regimen.       We will update an A1c today  Return to clinic in 6 months

## 2019-11-05 ENCOUNTER — TELEPHONE (OUTPATIENT)
Dept: INTERNAL MEDICINE | Facility: CLINIC | Age: 54
End: 2019-11-05

## 2019-11-05 NOTE — TELEPHONE ENCOUNTER
Spoke with patient and setup an appointment  ----- Message from Rodrick Angulo MD sent at 11/5/2019  7:21 AM CST -----  Unfortunately diabetes remains poorly controlled.  He would benefit from seeing diabetic education.

## 2019-12-13 ENCOUNTER — PES CALL (OUTPATIENT)
Dept: ADMINISTRATIVE | Facility: CLINIC | Age: 54
End: 2019-12-13

## 2019-12-13 DIAGNOSIS — Z12.11 COLON CANCER SCREENING: ICD-10-CM

## 2020-03-26 ENCOUNTER — TELEPHONE (OUTPATIENT)
Dept: INTERNAL MEDICINE | Facility: CLINIC | Age: 55
End: 2020-03-26

## 2020-03-26 NOTE — TELEPHONE ENCOUNTER
----- Message from Shira Busby sent at 3/26/2020  1:52 PM CDT -----  Contact: Pt   Name of Who is Calling: MIREYA GREEN [[id number]]    What is the request in detail: Patient is requesting a call back regards to getting medication for a cough and the pt state he isn't having any fever or shortness of breath  Please contact to further discuss and advise      Can the clinic reply by [facility]: No     What [facility]:  [phone] (home)

## 2020-03-26 NOTE — TELEPHONE ENCOUNTER
----- Message from Angel Contreras sent at 3/26/2020  4:52 PM CDT -----  Contact: MIREYA GREEN [[id number]]  Type:  Patient Returning Call    Who Called: MIREYA GREEN [[id number]]    Who Left Message for Patient: Kathy     Does the patient know what this is regarding?:yes      Best Call Back Number: [phone]     Additional Information:

## 2020-03-26 NOTE — TELEPHONE ENCOUNTER
Call placed to pt regarding requesting medication for cough. No answer, unable to leave voice message. Pt has not set up voice mailbox. Will attempt to leave pt message via [employer] portal, pt has not been active on portal for >90 days.  Kathy Cohen LPN

## 2020-03-27 RX ORDER — PROMETHAZINE HYDROCHLORIDE AND DEXTROMETHORPHAN HYDROBROMIDE 6.25; 15 MG/5ML; MG/5ML
SYRUP ORAL
COMMUNITY
End: 2020-03-27 | Stop reason: SDUPTHER

## 2020-03-27 RX ORDER — PROMETHAZINE HYDROCHLORIDE 6.25 MG/5ML
25 SYRUP ORAL EVERY 6 HOURS PRN
COMMUNITY
End: 2020-03-27

## 2020-03-27 RX ORDER — PROMETHAZINE HYDROCHLORIDE AND DEXTROMETHORPHAN HYDROBROMIDE 6.25; 15 MG/5ML; MG/5ML
5 SYRUP ORAL EVERY 8 HOURS PRN
Qty: 180 ML | Refills: 0 | Status: SHIPPED | OUTPATIENT
Start: 2020-03-27 | End: 2021-06-03

## 2020-03-27 NOTE — TELEPHONE ENCOUNTER
----- Message from Elaine Pool sent at 3/27/2020  1:14 PM CDT -----  Contact: MIREYA GREEN [[id number]]      MEDICATION  REFILL  REQUEST      Please refill the medication(s) listed below. Please call the patient when the prescription(s) is ready for  at this phone number   [phone] (M)      Medication #1   promethazine-dextromethorphan (PROMETHAZINE-DM) 6.25-15 mg/5 mL Syrp      Preferred Pharmacy:   [address]), [place] [address] [address]     [phone] (Phone)  [phone] (Fax)

## 2020-07-17 DIAGNOSIS — E11.9 TYPE 2 DIABETES MELLITUS WITHOUT COMPLICATION: ICD-10-CM

## 2020-09-11 DIAGNOSIS — E11.9 TYPE 2 DIABETES MELLITUS WITHOUT COMPLICATION: ICD-10-CM

## 2020-09-21 ENCOUNTER — PATIENT MESSAGE (OUTPATIENT)
Dept: ADMINISTRATIVE | Facility: HOSPITAL | Age: 55
End: 2020-09-21

## 2020-09-21 ENCOUNTER — PATIENT OUTREACH (OUTPATIENT)
Dept: ADMINISTRATIVE | Facility: HOSPITAL | Age: 55
End: 2020-09-21

## 2020-09-21 DIAGNOSIS — Z11.59 NEED FOR HEPATITIS C SCREENING TEST: ICD-10-CM

## 2020-09-21 DIAGNOSIS — E11.9 DIABETES MELLITUS WITHOUT COMPLICATION: Primary | ICD-10-CM

## 2020-10-07 ENCOUNTER — PATIENT MESSAGE (OUTPATIENT)
Dept: ADMINISTRATIVE | Facility: HOSPITAL | Age: 55
End: 2020-10-07

## 2020-10-30 ENCOUNTER — PATIENT MESSAGE (OUTPATIENT)
Dept: ADMINISTRATIVE | Facility: HOSPITAL | Age: 55
End: 2020-10-30

## 2020-12-17 DIAGNOSIS — Z12.11 COLON CANCER SCREENING: ICD-10-CM

## 2021-01-05 ENCOUNTER — PATIENT MESSAGE (OUTPATIENT)
Dept: ADMINISTRATIVE | Facility: HOSPITAL | Age: 56
End: 2021-01-05

## 2021-02-03 ENCOUNTER — PATIENT OUTREACH (OUTPATIENT)
Dept: ADMINISTRATIVE | Facility: HOSPITAL | Age: 56
End: 2021-02-03

## 2021-02-03 DIAGNOSIS — E11.9 DIABETIC EYE EXAM: Primary | ICD-10-CM

## 2021-02-03 DIAGNOSIS — Z01.00 DIABETIC EYE EXAM: Primary | ICD-10-CM

## 2021-03-27 ENCOUNTER — IMMUNIZATION (OUTPATIENT)
Dept: PRIMARY CARE CLINIC | Facility: CLINIC | Age: 56
End: 2021-03-27
Payer: COMMERCIAL

## 2021-03-27 DIAGNOSIS — Z23 NEED FOR VACCINATION: Primary | ICD-10-CM

## 2021-03-27 PROCEDURE — 91300 COVID-19, MRNA, LNP-S, PF, 30 MCG/0.3 ML DOSE VACCINE: CPT | Mod: PBBFAC | Performed by: EMERGENCY MEDICINE

## 2021-04-05 ENCOUNTER — PATIENT MESSAGE (OUTPATIENT)
Dept: ADMINISTRATIVE | Facility: HOSPITAL | Age: 56
End: 2021-04-05

## 2021-04-17 ENCOUNTER — IMMUNIZATION (OUTPATIENT)
Dept: PRIMARY CARE CLINIC | Facility: CLINIC | Age: 56
End: 2021-04-17
Payer: COMMERCIAL

## 2021-04-17 DIAGNOSIS — Z23 NEED FOR VACCINATION: Primary | ICD-10-CM

## 2021-04-17 PROCEDURE — 91300 COVID-19, MRNA, LNP-S, PF, 30 MCG/0.3 ML DOSE VACCINE: ICD-10-PCS | Mod: S$GLB,,, | Performed by: FAMILY MEDICINE

## 2021-04-17 PROCEDURE — 91300 COVID-19, MRNA, LNP-S, PF, 30 MCG/0.3 ML DOSE VACCINE: CPT | Mod: S$GLB,,, | Performed by: FAMILY MEDICINE

## 2021-04-17 PROCEDURE — 0002A COVID-19, MRNA, LNP-S, PF, 30 MCG/0.3 ML DOSE VACCINE: ICD-10-PCS | Mod: CV19,S$GLB,, | Performed by: FAMILY MEDICINE

## 2021-04-17 PROCEDURE — 0002A COVID-19, MRNA, LNP-S, PF, 30 MCG/0.3 ML DOSE VACCINE: CPT | Mod: CV19,S$GLB,, | Performed by: FAMILY MEDICINE

## 2021-05-20 ENCOUNTER — PATIENT OUTREACH (OUTPATIENT)
Dept: ADMINISTRATIVE | Facility: HOSPITAL | Age: 56
End: 2021-05-20

## 2021-05-20 DIAGNOSIS — E11.9 DIABETES MELLITUS WITHOUT COMPLICATION: Primary | ICD-10-CM

## 2021-05-20 DIAGNOSIS — Z00.00 ANNUAL PHYSICAL EXAM: ICD-10-CM

## 2021-05-20 DIAGNOSIS — E11.69 TYPE 2 DIABETES MELLITUS WITH OTHER SPECIFIED COMPLICATION, WITHOUT LONG-TERM CURRENT USE OF INSULIN: Primary | ICD-10-CM

## 2021-05-21 ENCOUNTER — PATIENT OUTREACH (OUTPATIENT)
Dept: ADMINISTRATIVE | Facility: HOSPITAL | Age: 56
End: 2021-05-21

## 2021-06-03 ENCOUNTER — HOSPITAL ENCOUNTER (INPATIENT)
Facility: OTHER | Age: 56
LOS: 7 days | Discharge: NURSING, REHAB OR LONG-TERM CARE | DRG: 064 | End: 2021-06-10
Attending: EMERGENCY MEDICINE | Admitting: HOSPITALIST
Payer: MEDICAID

## 2021-06-03 DIAGNOSIS — I63.02 THROMBOTIC STROKE INVOLVING BASILAR ARTERY: ICD-10-CM

## 2021-06-03 DIAGNOSIS — I63.9 ACUTE ISCHEMIC STROKE: ICD-10-CM

## 2021-06-03 DIAGNOSIS — I63.81 LACUNAR STROKE: ICD-10-CM

## 2021-06-03 DIAGNOSIS — R53.1 RIGHT SIDED WEAKNESS: ICD-10-CM

## 2021-06-03 DIAGNOSIS — I63.9 ACUTE EMBOLIC STROKE: ICD-10-CM

## 2021-06-03 DIAGNOSIS — E66.01 CLASS 2 SEVERE OBESITY DUE TO EXCESS CALORIES WITH SERIOUS COMORBIDITY AND BODY MASS INDEX (BMI) OF 35.0 TO 35.9 IN ADULT: ICD-10-CM

## 2021-06-03 DIAGNOSIS — I67.2 INTRACRANIAL ATHEROSCLEROSIS: ICD-10-CM

## 2021-06-03 DIAGNOSIS — E78.1 HYPERTRIGLYCERIDEMIA: ICD-10-CM

## 2021-06-03 DIAGNOSIS — G93.6 CYTOTOXIC CEREBRAL EDEMA: ICD-10-CM

## 2021-06-03 DIAGNOSIS — R47.1 DYSARTHRIA AND ANARTHRIA: ICD-10-CM

## 2021-06-03 DIAGNOSIS — E87.6 HYPOKALEMIA: ICD-10-CM

## 2021-06-03 DIAGNOSIS — E78.5 DYSLIPIDEMIA: ICD-10-CM

## 2021-06-03 DIAGNOSIS — I10 ESSENTIAL HYPERTENSION: ICD-10-CM

## 2021-06-03 DIAGNOSIS — I16.9 HYPERTENSIVE CRISIS: ICD-10-CM

## 2021-06-03 DIAGNOSIS — E11.10 TYPE 2 DIABETES MELLITUS WITH KETOACIDOSIS WITHOUT COMA, WITHOUT LONG-TERM CURRENT USE OF INSULIN: ICD-10-CM

## 2021-06-03 DIAGNOSIS — I63.9 STROKE: Primary | ICD-10-CM

## 2021-06-03 LAB
ALBUMIN SERPL BCP-MCNC: 3.8 G/DL (ref 3.5–5.2)
ALP SERPL-CCNC: 141 U/L (ref 55–135)
ALT SERPL W/O P-5'-P-CCNC: 27 U/L (ref 10–44)
ANION GAP SERPL CALC-SCNC: 17 MMOL/L (ref 8–16)
AST SERPL-CCNC: 19 U/L (ref 10–40)
BACTERIA #/AREA URNS HPF: NORMAL /HPF
BASOPHILS # BLD AUTO: 0.04 K/UL (ref 0–0.2)
BASOPHILS NFR BLD: 0.4 % (ref 0–1.9)
BILIRUB SERPL-MCNC: 0.8 MG/DL (ref 0.1–1)
BILIRUB UR QL STRIP: NEGATIVE
BUN SERPL-MCNC: 13 MG/DL (ref 6–20)
CALCIUM SERPL-MCNC: 9 MG/DL (ref 8.7–10.5)
CHLORIDE SERPL-SCNC: 100 MMOL/L (ref 95–110)
CHOLEST SERPL-MCNC: 212 MG/DL (ref 120–199)
CHOLEST/HDLC SERPL: 6.2 {RATIO} (ref 2–5)
CLARITY UR: CLEAR
CO2 SERPL-SCNC: 20 MMOL/L (ref 23–29)
COLOR UR: YELLOW
CREAT SERPL-MCNC: 1.2 MG/DL (ref 0.5–1.4)
CREAT SERPL-MCNC: 1.5 MG/DL (ref 0.5–1.4)
CTP QC/QA: YES
DIFFERENTIAL METHOD: ABNORMAL
EOSINOPHIL # BLD AUTO: 0.1 K/UL (ref 0–0.5)
EOSINOPHIL NFR BLD: 1.2 % (ref 0–8)
ERYTHROCYTE [DISTWIDTH] IN BLOOD BY AUTOMATED COUNT: 12.9 % (ref 11.5–14.5)
EST. GFR  (AFRICAN AMERICAN): >60 ML/MIN/1.73 M^2
EST. GFR  (NON AFRICAN AMERICAN): 52 ML/MIN/1.73 M^2
GLUCOSE SERPL-MCNC: 365 MG/DL (ref 70–110)
GLUCOSE UR QL STRIP: ABNORMAL
HCT VFR BLD AUTO: 48.1 % (ref 40–54)
HDLC SERPL-MCNC: 34 MG/DL (ref 40–75)
HDLC SERPL: 16 % (ref 20–50)
HGB BLD-MCNC: 16.3 G/DL (ref 14–18)
HGB UR QL STRIP: ABNORMAL
HYALINE CASTS #/AREA URNS LPF: 0 /LPF
IMM GRANULOCYTES # BLD AUTO: 0.01 K/UL (ref 0–0.04)
IMM GRANULOCYTES NFR BLD AUTO: 0.1 % (ref 0–0.5)
INR PPP: 1.1 (ref 0.8–1.2)
KETONES UR QL STRIP: ABNORMAL
LDLC SERPL CALC-MCNC: ABNORMAL MG/DL (ref 63–159)
LEUKOCYTE ESTERASE UR QL STRIP: NEGATIVE
LYMPHOCYTES # BLD AUTO: 3.4 K/UL (ref 1–4.8)
LYMPHOCYTES NFR BLD: 34.4 % (ref 18–48)
MCH RBC QN AUTO: 26.8 PG (ref 27–31)
MCHC RBC AUTO-ENTMCNC: 33.9 G/DL (ref 32–36)
MCV RBC AUTO: 79 FL (ref 82–98)
MICROSCOPIC COMMENT: NORMAL
MONOCYTES # BLD AUTO: 0.6 K/UL (ref 0.3–1)
MONOCYTES NFR BLD: 6 % (ref 4–15)
NEUTROPHILS # BLD AUTO: 5.7 K/UL (ref 1.8–7.7)
NEUTROPHILS NFR BLD: 57.9 % (ref 38–73)
NITRITE UR QL STRIP: NEGATIVE
NONHDLC SERPL-MCNC: 178 MG/DL
NRBC BLD-RTO: 0 /100 WBC
PH UR STRIP: 7 [PH] (ref 5–8)
PLATELET # BLD AUTO: 257 K/UL (ref 150–450)
PMV BLD AUTO: 9.6 FL (ref 9.2–12.9)
POC PTINR: 1.2 (ref 0.9–1.2)
POC PTWBT: 14.1 SEC (ref 9.7–14.3)
POCT GLUCOSE: 232 MG/DL (ref 70–110)
POCT GLUCOSE: 243 MG/DL (ref 70–110)
POCT GLUCOSE: 343 MG/DL (ref 70–110)
POTASSIUM SERPL-SCNC: 2.7 MMOL/L (ref 3.5–5.1)
PROT SERPL-MCNC: 8.1 G/DL (ref 6–8.4)
PROT UR QL STRIP: ABNORMAL
PROTHROMBIN TIME: 11.7 SEC (ref 9–12.5)
RBC # BLD AUTO: 6.08 M/UL (ref 4.6–6.2)
RBC #/AREA URNS HPF: 2 /HPF (ref 0–4)
SAMPLE: NORMAL
SAMPLE: NORMAL
SARS-COV-2 RDRP RESP QL NAA+PROBE: NEGATIVE
SODIUM SERPL-SCNC: 137 MMOL/L (ref 136–145)
SP GR UR STRIP: 1.01 (ref 1–1.03)
SQUAMOUS #/AREA URNS HPF: 0 /HPF
TRIGL SERPL-MCNC: 414 MG/DL (ref 30–150)
TSH SERPL DL<=0.005 MIU/L-ACNC: 1.25 UIU/ML (ref 0.4–4)
URN SPEC COLLECT METH UR: ABNORMAL
UROBILINOGEN UR STRIP-ACNC: NEGATIVE EU/DL
WBC # BLD AUTO: 9.85 K/UL (ref 3.9–12.7)
WBC #/AREA URNS HPF: 0 /HPF (ref 0–5)
YEAST URNS QL MICRO: NORMAL

## 2021-06-03 PROCEDURE — 25000003 PHARM REV CODE 250: Performed by: INTERNAL MEDICINE

## 2021-06-03 PROCEDURE — 93010 EKG 12-LEAD: ICD-10-PCS | Mod: ,,, | Performed by: INTERNAL MEDICINE

## 2021-06-03 PROCEDURE — 84443 ASSAY THYROID STIM HORMONE: CPT | Performed by: INTERNAL MEDICINE

## 2021-06-03 PROCEDURE — 99204 PR OFFICE/OUTPT VISIT, NEW, LEVL IV, 45-59 MIN: ICD-10-PCS | Mod: 95,,, | Performed by: PSYCHIATRY & NEUROLOGY

## 2021-06-03 PROCEDURE — 99291 CRITICAL CARE FIRST HOUR: CPT | Mod: 25

## 2021-06-03 PROCEDURE — 36415 COLL VENOUS BLD VENIPUNCTURE: CPT | Performed by: HOSPITALIST

## 2021-06-03 PROCEDURE — 94761 N-INVAS EAR/PLS OXIMETRY MLT: CPT

## 2021-06-03 PROCEDURE — 63600175 PHARM REV CODE 636 W HCPCS: Performed by: HOSPITALIST

## 2021-06-03 PROCEDURE — 99900035 HC TECH TIME PER 15 MIN (STAT)

## 2021-06-03 PROCEDURE — U0002 COVID-19 LAB TEST NON-CDC: HCPCS | Performed by: INTERNAL MEDICINE

## 2021-06-03 PROCEDURE — 99223 1ST HOSP IP/OBS HIGH 75: CPT | Mod: ,,, | Performed by: HOSPITALIST

## 2021-06-03 PROCEDURE — 25000003 PHARM REV CODE 250: Performed by: HOSPITALIST

## 2021-06-03 PROCEDURE — 99223 PR INITIAL HOSPITAL CARE,LEVL III: ICD-10-PCS | Mod: ,,, | Performed by: HOSPITALIST

## 2021-06-03 PROCEDURE — 92610 EVALUATE SWALLOWING FUNCTION: CPT

## 2021-06-03 PROCEDURE — 85610 PROTHROMBIN TIME: CPT | Performed by: INTERNAL MEDICINE

## 2021-06-03 PROCEDURE — 93005 ELECTROCARDIOGRAM TRACING: CPT

## 2021-06-03 PROCEDURE — 99204 OFFICE O/P NEW MOD 45 MIN: CPT | Mod: 95,,, | Performed by: PSYCHIATRY & NEUROLOGY

## 2021-06-03 PROCEDURE — 82565 ASSAY OF CREATININE: CPT

## 2021-06-03 PROCEDURE — 80061 LIPID PANEL: CPT | Performed by: INTERNAL MEDICINE

## 2021-06-03 PROCEDURE — 93010 ELECTROCARDIOGRAM REPORT: CPT | Mod: ,,, | Performed by: INTERNAL MEDICINE

## 2021-06-03 PROCEDURE — 83036 HEMOGLOBIN GLYCOSYLATED A1C: CPT | Performed by: HOSPITALIST

## 2021-06-03 PROCEDURE — 36415 COLL VENOUS BLD VENIPUNCTURE: CPT | Performed by: INTERNAL MEDICINE

## 2021-06-03 PROCEDURE — 85610 PROTHROMBIN TIME: CPT

## 2021-06-03 PROCEDURE — 81000 URINALYSIS NONAUTO W/SCOPE: CPT | Performed by: HOSPITALIST

## 2021-06-03 PROCEDURE — 80053 COMPREHEN METABOLIC PANEL: CPT | Performed by: INTERNAL MEDICINE

## 2021-06-03 PROCEDURE — 63600175 PHARM REV CODE 636 W HCPCS: Performed by: INTERNAL MEDICINE

## 2021-06-03 PROCEDURE — 82962 GLUCOSE BLOOD TEST: CPT

## 2021-06-03 PROCEDURE — 63600175 PHARM REV CODE 636 W HCPCS: Performed by: NURSE PRACTITIONER

## 2021-06-03 PROCEDURE — 21400001 HC TELEMETRY ROOM

## 2021-06-03 PROCEDURE — C9399 UNCLASSIFIED DRUGS OR BIOLOG: HCPCS | Performed by: HOSPITALIST

## 2021-06-03 PROCEDURE — 85025 COMPLETE CBC W/AUTO DIFF WBC: CPT | Performed by: INTERNAL MEDICINE

## 2021-06-03 PROCEDURE — 25500020 PHARM REV CODE 255: Performed by: EMERGENCY MEDICINE

## 2021-06-03 RX ORDER — CLOPIDOGREL 300 MG/1
300 TABLET, FILM COATED ORAL
Status: COMPLETED | OUTPATIENT
Start: 2021-06-03 | End: 2021-06-03

## 2021-06-03 RX ORDER — ATORVASTATIN CALCIUM 20 MG/1
80 TABLET, FILM COATED ORAL DAILY
Status: DISCONTINUED | OUTPATIENT
Start: 2021-06-03 | End: 2021-06-10 | Stop reason: HOSPADM

## 2021-06-03 RX ORDER — ASPIRIN 81 MG/1
81 TABLET ORAL DAILY
Status: DISCONTINUED | OUTPATIENT
Start: 2021-06-04 | End: 2021-06-10 | Stop reason: HOSPADM

## 2021-06-03 RX ORDER — ENOXAPARIN SODIUM 100 MG/ML
40 INJECTION SUBCUTANEOUS EVERY 24 HOURS
Status: DISCONTINUED | OUTPATIENT
Start: 2021-06-03 | End: 2021-06-10 | Stop reason: HOSPADM

## 2021-06-03 RX ORDER — ONDANSETRON 2 MG/ML
4 INJECTION INTRAMUSCULAR; INTRAVENOUS EVERY 12 HOURS PRN
Status: DISCONTINUED | OUTPATIENT
Start: 2021-06-03 | End: 2021-06-10 | Stop reason: HOSPADM

## 2021-06-03 RX ORDER — POTASSIUM CHLORIDE 7.45 MG/ML
10 INJECTION INTRAVENOUS
Status: COMPLETED | OUTPATIENT
Start: 2021-06-03 | End: 2021-06-03

## 2021-06-03 RX ORDER — HYDRALAZINE HYDROCHLORIDE 20 MG/ML
10 INJECTION INTRAMUSCULAR; INTRAVENOUS ONCE
Status: COMPLETED | OUTPATIENT
Start: 2021-06-03 | End: 2021-06-03

## 2021-06-03 RX ORDER — GLUCAGON 1 MG
1 KIT INJECTION
Status: DISCONTINUED | OUTPATIENT
Start: 2021-06-03 | End: 2021-06-10 | Stop reason: HOSPADM

## 2021-06-03 RX ORDER — IBUPROFEN 200 MG
16 TABLET ORAL
Status: DISCONTINUED | OUTPATIENT
Start: 2021-06-03 | End: 2021-06-10 | Stop reason: HOSPADM

## 2021-06-03 RX ORDER — ASPIRIN 325 MG
325 TABLET, DELAYED RELEASE (ENTERIC COATED) ORAL
Status: COMPLETED | OUTPATIENT
Start: 2021-06-03 | End: 2021-06-03

## 2021-06-03 RX ORDER — INSULIN ASPART 100 [IU]/ML
0-5 INJECTION, SOLUTION INTRAVENOUS; SUBCUTANEOUS
Status: DISCONTINUED | OUTPATIENT
Start: 2021-06-03 | End: 2021-06-10 | Stop reason: HOSPADM

## 2021-06-03 RX ORDER — SODIUM CHLORIDE AND POTASSIUM CHLORIDE 300; 900 MG/100ML; MG/100ML
INJECTION, SOLUTION INTRAVENOUS CONTINUOUS
Status: DISCONTINUED | OUTPATIENT
Start: 2021-06-03 | End: 2021-06-04

## 2021-06-03 RX ORDER — IBUPROFEN 200 MG
24 TABLET ORAL
Status: DISCONTINUED | OUTPATIENT
Start: 2021-06-03 | End: 2021-06-10 | Stop reason: HOSPADM

## 2021-06-03 RX ORDER — POTASSIUM CHLORIDE 20 MEQ/1
40 TABLET, EXTENDED RELEASE ORAL
Status: COMPLETED | OUTPATIENT
Start: 2021-06-03 | End: 2021-06-03

## 2021-06-03 RX ORDER — LABETALOL HYDROCHLORIDE 5 MG/ML
10 INJECTION, SOLUTION INTRAVENOUS
Status: DISCONTINUED | OUTPATIENT
Start: 2021-06-03 | End: 2021-06-10 | Stop reason: HOSPADM

## 2021-06-03 RX ORDER — CLOPIDOGREL BISULFATE 75 MG/1
75 TABLET ORAL DAILY
Status: DISCONTINUED | OUTPATIENT
Start: 2021-06-04 | End: 2021-06-10 | Stop reason: HOSPADM

## 2021-06-03 RX ADMIN — ATORVASTATIN CALCIUM 80 MG: 20 TABLET, FILM COATED ORAL at 05:06

## 2021-06-03 RX ADMIN — SODIUM CHLORIDE AND POTASSIUM CHLORIDE: 9; 2.98 INJECTION, SOLUTION INTRAVENOUS at 04:06

## 2021-06-03 RX ADMIN — ASPIRIN 325 MG: 325 TABLET, COATED ORAL at 01:06

## 2021-06-03 RX ADMIN — ENOXAPARIN SODIUM 40 MG: 40 INJECTION SUBCUTANEOUS at 05:06

## 2021-06-03 RX ADMIN — POTASSIUM CHLORIDE 40 MEQ: 1500 TABLET, EXTENDED RELEASE ORAL at 01:06

## 2021-06-03 RX ADMIN — CLOPIDOGREL BISULFATE 300 MG: 300 TABLET, FILM COATED ORAL at 01:06

## 2021-06-03 RX ADMIN — SODIUM CHLORIDE 1000 ML: 0.9 INJECTION, SOLUTION INTRAVENOUS at 01:06

## 2021-06-03 RX ADMIN — INSULIN ASPART 2 UNITS: 100 INJECTION, SOLUTION INTRAVENOUS; SUBCUTANEOUS at 06:06

## 2021-06-03 RX ADMIN — IOHEXOL 100 ML: 350 INJECTION, SOLUTION INTRAVENOUS at 01:06

## 2021-06-03 RX ADMIN — HYDRALAZINE HYDROCHLORIDE 10 MG: 20 INJECTION, SOLUTION INTRAMUSCULAR; INTRAVENOUS at 10:06

## 2021-06-03 RX ADMIN — INSULIN ASPART 1 UNITS: 100 INJECTION, SOLUTION INTRAVENOUS; SUBCUTANEOUS at 09:06

## 2021-06-03 RX ADMIN — LABETALOL HYDROCHLORIDE 10 MG: 5 INJECTION INTRAVENOUS at 07:06

## 2021-06-03 RX ADMIN — INSULIN DETEMIR 10 UNITS: 100 INJECTION, SOLUTION SUBCUTANEOUS at 05:06

## 2021-06-03 RX ADMIN — POTASSIUM CHLORIDE 10 MEQ: 7.46 INJECTION, SOLUTION INTRAVENOUS at 01:06

## 2021-06-04 PROBLEM — R53.1 RIGHT SIDED WEAKNESS: Status: ACTIVE | Noted: 2021-06-04

## 2021-06-04 PROBLEM — I63.02 THROMBOTIC STROKE INVOLVING BASILAR ARTERY: Status: ACTIVE | Noted: 2021-06-03

## 2021-06-04 PROBLEM — R47.1 DYSARTHRIA AND ANARTHRIA: Status: ACTIVE | Noted: 2021-06-04

## 2021-06-04 PROBLEM — G93.6 CYTOTOXIC CEREBRAL EDEMA: Status: ACTIVE | Noted: 2021-06-04

## 2021-06-04 PROBLEM — I63.9 STROKE: Status: ACTIVE | Noted: 2021-06-04

## 2021-06-04 LAB
ALBUMIN SERPL BCP-MCNC: 3.4 G/DL (ref 3.5–5.2)
ALBUMIN SERPL BCP-MCNC: 3.8 G/DL (ref 3.5–5.2)
ALP SERPL-CCNC: 121 U/L (ref 55–135)
ALP SERPL-CCNC: 131 U/L (ref 55–135)
ALT SERPL W/O P-5'-P-CCNC: 23 U/L (ref 10–44)
ALT SERPL W/O P-5'-P-CCNC: 24 U/L (ref 10–44)
ANION GAP SERPL CALC-SCNC: 12 MMOL/L (ref 8–16)
ANION GAP SERPL CALC-SCNC: 13 MMOL/L (ref 8–16)
ASCENDING AORTA: 3.21 CM
AST SERPL-CCNC: 20 U/L (ref 10–40)
AST SERPL-CCNC: 23 U/L (ref 10–40)
AV INDEX (PROSTH): 0.81
AV MEAN GRADIENT: 3 MMHG
AV PEAK GRADIENT: 5 MMHG
AV VALVE AREA: 3.01 CM2
AV VELOCITY RATIO: 0.71
BASOPHILS # BLD AUTO: 0.02 K/UL (ref 0–0.2)
BASOPHILS # BLD AUTO: 0.03 K/UL (ref 0–0.2)
BASOPHILS NFR BLD: 0.3 % (ref 0–1.9)
BASOPHILS NFR BLD: 0.4 % (ref 0–1.9)
BILIRUB SERPL-MCNC: 0.9 MG/DL (ref 0.1–1)
BILIRUB SERPL-MCNC: 1.2 MG/DL (ref 0.1–1)
BSA FOR ECHO PROCEDURE: 2.25 M2
BUN SERPL-MCNC: 10 MG/DL (ref 6–20)
BUN SERPL-MCNC: 9 MG/DL (ref 6–20)
CALCIUM SERPL-MCNC: 8.5 MG/DL (ref 8.7–10.5)
CALCIUM SERPL-MCNC: 9.4 MG/DL (ref 8.7–10.5)
CHLORIDE SERPL-SCNC: 101 MMOL/L (ref 95–110)
CHLORIDE SERPL-SCNC: 106 MMOL/L (ref 95–110)
CO2 SERPL-SCNC: 17 MMOL/L (ref 23–29)
CO2 SERPL-SCNC: 18 MMOL/L (ref 23–29)
CREAT SERPL-MCNC: 1.1 MG/DL (ref 0.5–1.4)
CREAT SERPL-MCNC: 1.1 MG/DL (ref 0.5–1.4)
CV ECHO LV RWT: 0.54 CM
DIFFERENTIAL METHOD: ABNORMAL
DIFFERENTIAL METHOD: ABNORMAL
DOP CALC AO PEAK VEL: 1.17 M/S
DOP CALC AO VTI: 17.91 CM
DOP CALC LVOT AREA: 3.7 CM2
DOP CALC LVOT DIAMETER: 2.17 CM
DOP CALC LVOT PEAK VEL: 0.83 M/S
DOP CALC LVOT STROKE VOLUME: 53.89 CM3
DOP CALCLVOT PEAK VEL VTI: 14.58 CM
E WAVE DECELERATION TIME: 203.13 MSEC
E/A RATIO: 0.53
E/E' RATIO: 8 M/S
ECHO LV POSTERIOR WALL: 1.36 CM (ref 0.6–1.1)
EJECTION FRACTION: 65 %
EOSINOPHIL # BLD AUTO: 0.1 K/UL (ref 0–0.5)
EOSINOPHIL # BLD AUTO: 0.1 K/UL (ref 0–0.5)
EOSINOPHIL NFR BLD: 0.9 % (ref 0–8)
EOSINOPHIL NFR BLD: 1.9 % (ref 0–8)
ERYTHROCYTE [DISTWIDTH] IN BLOOD BY AUTOMATED COUNT: 13.2 % (ref 11.5–14.5)
ERYTHROCYTE [DISTWIDTH] IN BLOOD BY AUTOMATED COUNT: 13.3 % (ref 11.5–14.5)
EST. GFR  (AFRICAN AMERICAN): >60 ML/MIN/1.73 M^2
EST. GFR  (AFRICAN AMERICAN): >60 ML/MIN/1.73 M^2
EST. GFR  (NON AFRICAN AMERICAN): >60 ML/MIN/1.73 M^2
EST. GFR  (NON AFRICAN AMERICAN): >60 ML/MIN/1.73 M^2
ESTIMATED AVG GLUCOSE: 309 MG/DL (ref 68–131)
FRACTIONAL SHORTENING: 38 % (ref 28–44)
GLUCOSE SERPL-MCNC: 217 MG/DL (ref 70–110)
GLUCOSE SERPL-MCNC: 232 MG/DL (ref 70–110)
HBA1C MFR BLD: 12.4 % (ref 4–5.6)
HCT VFR BLD AUTO: 42.8 % (ref 40–54)
HCT VFR BLD AUTO: 48.5 % (ref 40–54)
HGB BLD-MCNC: 14.6 G/DL (ref 14–18)
HGB BLD-MCNC: 16.5 G/DL (ref 14–18)
IMM GRANULOCYTES # BLD AUTO: 0.01 K/UL (ref 0–0.04)
IMM GRANULOCYTES # BLD AUTO: 0.01 K/UL (ref 0–0.04)
IMM GRANULOCYTES NFR BLD AUTO: 0.1 % (ref 0–0.5)
IMM GRANULOCYTES NFR BLD AUTO: 0.1 % (ref 0–0.5)
INTERVENTRICULAR SEPTUM: 1.45 CM (ref 0.6–1.1)
IVRT: 105.36 MSEC
LA MAJOR: 5.07 CM
LA MINOR: 5.07 CM
LA WIDTH: 3.55 CM
LEFT ATRIUM SIZE: 3.85 CM
LEFT ATRIUM VOLUME INDEX MOD: 29.4 ML/M2
LEFT ATRIUM VOLUME INDEX: 27 ML/M2
LEFT ATRIUM VOLUME MOD: 64 CM3
LEFT ATRIUM VOLUME: 58.9 CM3
LEFT INTERNAL DIMENSION IN SYSTOLE: 3.14 CM (ref 2.1–4)
LEFT VENTRICLE DIASTOLIC VOLUME INDEX: 55.25 ML/M2
LEFT VENTRICLE DIASTOLIC VOLUME: 120.44 ML
LEFT VENTRICLE MASS INDEX: 136 G/M2
LEFT VENTRICLE SYSTOLIC VOLUME INDEX: 17.9 ML/M2
LEFT VENTRICLE SYSTOLIC VOLUME: 39.11 ML
LEFT VENTRICULAR INTERNAL DIMENSION IN DIASTOLE: 5.04 CM (ref 3.5–6)
LEFT VENTRICULAR MASS: 296.55 G
LV LATERAL E/E' RATIO: 7 M/S
LV SEPTAL E/E' RATIO: 9.33 M/S
LYMPHOCYTES # BLD AUTO: 1.7 K/UL (ref 1–4.8)
LYMPHOCYTES # BLD AUTO: 2.4 K/UL (ref 1–4.8)
LYMPHOCYTES NFR BLD: 23.7 % (ref 18–48)
LYMPHOCYTES NFR BLD: 32.9 % (ref 18–48)
MAGNESIUM SERPL-MCNC: 1.6 MG/DL (ref 1.6–2.6)
MAGNESIUM SERPL-MCNC: 1.8 MG/DL (ref 1.6–2.6)
MCH RBC QN AUTO: 26.9 PG (ref 27–31)
MCH RBC QN AUTO: 27 PG (ref 27–31)
MCHC RBC AUTO-ENTMCNC: 34 G/DL (ref 32–36)
MCHC RBC AUTO-ENTMCNC: 34.1 G/DL (ref 32–36)
MCV RBC AUTO: 79 FL (ref 82–98)
MCV RBC AUTO: 80 FL (ref 82–98)
MONOCYTES # BLD AUTO: 0.3 K/UL (ref 0.3–1)
MONOCYTES # BLD AUTO: 0.4 K/UL (ref 0.3–1)
MONOCYTES NFR BLD: 4.9 % (ref 4–15)
MONOCYTES NFR BLD: 6.1 % (ref 4–15)
MV A" WAVE DURATION": 9.61 MSEC
MV PEAK A VEL: 0.53 M/S
MV PEAK E VEL: 0.28 M/S
MV STENOSIS PRESSURE HALF TIME: 58.91 MS
MV VALVE AREA P 1/2 METHOD: 3.73 CM2
NEUTROPHILS # BLD AUTO: 4.3 K/UL (ref 1.8–7.7)
NEUTROPHILS # BLD AUTO: 4.9 K/UL (ref 1.8–7.7)
NEUTROPHILS NFR BLD: 58.6 % (ref 38–73)
NEUTROPHILS NFR BLD: 70.1 % (ref 38–73)
NRBC BLD-RTO: 0 /100 WBC
NRBC BLD-RTO: 0 /100 WBC
PHOSPHATE SERPL-MCNC: 1.8 MG/DL (ref 2.7–4.5)
PHOSPHATE SERPL-MCNC: 2.1 MG/DL (ref 2.7–4.5)
PISA TR MAX VEL: 1.67 M/S
PLATELET # BLD AUTO: 240 K/UL (ref 150–450)
PLATELET # BLD AUTO: 255 K/UL (ref 150–450)
PMV BLD AUTO: 9.6 FL (ref 9.2–12.9)
PMV BLD AUTO: 9.8 FL (ref 9.2–12.9)
POCT GLUCOSE: 219 MG/DL (ref 70–110)
POCT GLUCOSE: 240 MG/DL (ref 70–110)
POCT GLUCOSE: 245 MG/DL (ref 70–110)
POTASSIUM SERPL-SCNC: 3.8 MMOL/L (ref 3.5–5.1)
PROT SERPL-MCNC: 7.1 G/DL (ref 6–8.4)
PROT SERPL-MCNC: 8 G/DL (ref 6–8.4)
PULM VEIN S/D RATIO: 1.54
PV PEAK D VEL: 0.24 M/S
PV PEAK S VEL: 0.37 M/S
PV PEAK VELOCITY: 0.89 CM/S
RA MAJOR: 4.79 CM
RA WIDTH: 3.56 CM
RBC # BLD AUTO: 5.42 M/UL (ref 4.6–6.2)
RBC # BLD AUTO: 6.1 M/UL (ref 4.6–6.2)
RIGHT VENTRICULAR END-DIASTOLIC DIMENSION: 2.36 CM
RV TISSUE DOPPLER FREE WALL SYSTOLIC VELOCITY 1 (APICAL 4 CHAMBER VIEW): 15.34 CM/S
SINUS: 3.49 CM
SODIUM SERPL-SCNC: 132 MMOL/L (ref 136–145)
SODIUM SERPL-SCNC: 135 MMOL/L (ref 136–145)
STJ: 3.49 CM
TDI LATERAL: 0.04 M/S
TDI SEPTAL: 0.03 M/S
TDI: 0.04 M/S
TR MAX PG: 11 MMHG
TRICUSPID ANNULAR PLANE SYSTOLIC EXCURSION: 2.23 CM
WBC # BLD AUTO: 6.95 K/UL (ref 3.9–12.7)
WBC # BLD AUTO: 7.26 K/UL (ref 3.9–12.7)

## 2021-06-04 PROCEDURE — 63600175 PHARM REV CODE 636 W HCPCS: Performed by: HOSPITALIST

## 2021-06-04 PROCEDURE — 97116 GAIT TRAINING THERAPY: CPT

## 2021-06-04 PROCEDURE — 80053 COMPREHEN METABOLIC PANEL: CPT | Mod: 91 | Performed by: HOSPITALIST

## 2021-06-04 PROCEDURE — 84100 ASSAY OF PHOSPHORUS: CPT | Performed by: HOSPITALIST

## 2021-06-04 PROCEDURE — 84100 ASSAY OF PHOSPHORUS: CPT | Mod: 91 | Performed by: HOSPITALIST

## 2021-06-04 PROCEDURE — 97530 THERAPEUTIC ACTIVITIES: CPT

## 2021-06-04 PROCEDURE — 97535 SELF CARE MNGMENT TRAINING: CPT

## 2021-06-04 PROCEDURE — 25000003 PHARM REV CODE 250: Performed by: HOSPITALIST

## 2021-06-04 PROCEDURE — 94761 N-INVAS EAR/PLS OXIMETRY MLT: CPT

## 2021-06-04 PROCEDURE — 97166 OT EVAL MOD COMPLEX 45 MIN: CPT

## 2021-06-04 PROCEDURE — 36415 COLL VENOUS BLD VENIPUNCTURE: CPT | Performed by: HOSPITALIST

## 2021-06-04 PROCEDURE — 99233 PR SUBSEQUENT HOSPITAL CARE,LEVL III: ICD-10-PCS | Mod: ,,, | Performed by: HOSPITALIST

## 2021-06-04 PROCEDURE — 97161 PT EVAL LOW COMPLEX 20 MIN: CPT

## 2021-06-04 PROCEDURE — 84132 ASSAY OF SERUM POTASSIUM: CPT | Performed by: HOSPITALIST

## 2021-06-04 PROCEDURE — 85025 COMPLETE CBC W/AUTO DIFF WBC: CPT | Performed by: HOSPITALIST

## 2021-06-04 PROCEDURE — 99232 PR SUBSEQUENT HOSPITAL CARE,LEVL II: ICD-10-PCS | Mod: 95,,, | Performed by: NURSE PRACTITIONER

## 2021-06-04 PROCEDURE — 83735 ASSAY OF MAGNESIUM: CPT | Mod: 91 | Performed by: HOSPITALIST

## 2021-06-04 PROCEDURE — 99232 SBSQ HOSP IP/OBS MODERATE 35: CPT | Mod: 95,,, | Performed by: NURSE PRACTITIONER

## 2021-06-04 PROCEDURE — 99900035 HC TECH TIME PER 15 MIN (STAT)

## 2021-06-04 PROCEDURE — 80053 COMPREHEN METABOLIC PANEL: CPT | Performed by: HOSPITALIST

## 2021-06-04 PROCEDURE — 20000000 HC ICU ROOM

## 2021-06-04 PROCEDURE — 99233 SBSQ HOSP IP/OBS HIGH 50: CPT | Mod: ,,, | Performed by: HOSPITALIST

## 2021-06-04 PROCEDURE — 83735 ASSAY OF MAGNESIUM: CPT | Performed by: HOSPITALIST

## 2021-06-04 RX ORDER — AMLODIPINE BESYLATE 5 MG/1
10 TABLET ORAL DAILY
Status: DISCONTINUED | OUTPATIENT
Start: 2021-06-04 | End: 2021-06-04

## 2021-06-04 RX ORDER — OXYCODONE HYDROCHLORIDE 5 MG/1
5 TABLET ORAL EVERY 6 HOURS PRN
Status: DISCONTINUED | OUTPATIENT
Start: 2021-06-04 | End: 2021-06-10 | Stop reason: HOSPADM

## 2021-06-04 RX ORDER — AMLODIPINE BESYLATE 5 MG/1
5 TABLET ORAL DAILY
Status: DISCONTINUED | OUTPATIENT
Start: 2021-06-04 | End: 2021-06-04

## 2021-06-04 RX ORDER — INSULIN ASPART 100 [IU]/ML
3 INJECTION, SOLUTION INTRAVENOUS; SUBCUTANEOUS
Status: DISCONTINUED | OUTPATIENT
Start: 2021-06-04 | End: 2021-06-04

## 2021-06-04 RX ORDER — OXYCODONE HYDROCHLORIDE 5 MG/1
5 TABLET ORAL ONCE
Status: COMPLETED | OUTPATIENT
Start: 2021-06-04 | End: 2021-06-04

## 2021-06-04 RX ORDER — DIAZEPAM 5 MG/1
5 TABLET ORAL
Status: DISCONTINUED | OUTPATIENT
Start: 2021-06-04 | End: 2021-06-04

## 2021-06-04 RX ORDER — AMLODIPINE BESYLATE 5 MG/1
5 TABLET ORAL ONCE
Status: COMPLETED | OUTPATIENT
Start: 2021-06-04 | End: 2021-06-04

## 2021-06-04 RX ORDER — ACETAMINOPHEN 500 MG
1000 TABLET ORAL ONCE
Status: COMPLETED | OUTPATIENT
Start: 2021-06-04 | End: 2021-06-04

## 2021-06-04 RX ORDER — LOSARTAN POTASSIUM 50 MG/1
50 TABLET ORAL DAILY
Status: DISCONTINUED | OUTPATIENT
Start: 2021-06-04 | End: 2021-06-04

## 2021-06-04 RX ORDER — HYDRALAZINE HYDROCHLORIDE 20 MG/ML
10 INJECTION INTRAMUSCULAR; INTRAVENOUS ONCE
Status: COMPLETED | OUTPATIENT
Start: 2021-06-04 | End: 2021-06-04

## 2021-06-04 RX ORDER — ACETAMINOPHEN 500 MG
1000 TABLET ORAL EVERY 8 HOURS PRN
Status: DISCONTINUED | OUTPATIENT
Start: 2021-06-04 | End: 2021-06-04

## 2021-06-04 RX ORDER — NIFEDIPINE 30 MG/1
30 TABLET, EXTENDED RELEASE ORAL DAILY
Status: DISCONTINUED | OUTPATIENT
Start: 2021-06-05 | End: 2021-06-04

## 2021-06-04 RX ORDER — ACETAMINOPHEN 500 MG
1000 TABLET ORAL EVERY 8 HOURS PRN
Status: DISCONTINUED | OUTPATIENT
Start: 2021-06-04 | End: 2021-06-10 | Stop reason: HOSPADM

## 2021-06-04 RX ORDER — NICARDIPINE HYDROCHLORIDE 0.2 MG/ML
0-15 INJECTION INTRAVENOUS CONTINUOUS
Status: DISCONTINUED | OUTPATIENT
Start: 2021-06-04 | End: 2021-06-06

## 2021-06-04 RX ORDER — SODIUM CHLORIDE AND POTASSIUM CHLORIDE 300; 900 MG/100ML; MG/100ML
INJECTION, SOLUTION INTRAVENOUS CONTINUOUS
Status: DISCONTINUED | OUTPATIENT
Start: 2021-06-04 | End: 2021-06-04

## 2021-06-04 RX ORDER — DIPHENHYDRAMINE HYDROCHLORIDE 50 MG/ML
50 INJECTION INTRAMUSCULAR; INTRAVENOUS ONCE
Status: COMPLETED | OUTPATIENT
Start: 2021-06-04 | End: 2021-06-04

## 2021-06-04 RX ORDER — MUPIROCIN 20 MG/G
OINTMENT TOPICAL 2 TIMES DAILY
Status: COMPLETED | OUTPATIENT
Start: 2021-06-04 | End: 2021-06-09

## 2021-06-04 RX ORDER — DIAZEPAM 10 MG/2ML
5 INJECTION INTRAMUSCULAR ONCE
Status: COMPLETED | OUTPATIENT
Start: 2021-06-04 | End: 2021-06-04

## 2021-06-04 RX ADMIN — LABETALOL HYDROCHLORIDE 10 MG: 5 INJECTION INTRAVENOUS at 01:06

## 2021-06-04 RX ADMIN — INSULIN ASPART 2 UNITS: 100 INJECTION, SOLUTION INTRAVENOUS; SUBCUTANEOUS at 11:06

## 2021-06-04 RX ADMIN — INSULIN ASPART 3 UNITS: 100 INJECTION, SOLUTION INTRAVENOUS; SUBCUTANEOUS at 11:06

## 2021-06-04 RX ADMIN — AMLODIPINE BESYLATE 10 MG: 5 TABLET ORAL at 10:06

## 2021-06-04 RX ADMIN — ENOXAPARIN SODIUM 40 MG: 40 INJECTION SUBCUTANEOUS at 05:06

## 2021-06-04 RX ADMIN — DIPHENHYDRAMINE HYDROCHLORIDE 50 MG: 50 INJECTION, SOLUTION INTRAMUSCULAR; INTRAVENOUS at 04:06

## 2021-06-04 RX ADMIN — ACETAMINOPHEN 1000 MG: 500 TABLET, FILM COATED ORAL at 09:06

## 2021-06-04 RX ADMIN — INSULIN ASPART 2 UNITS: 100 INJECTION, SOLUTION INTRAVENOUS; SUBCUTANEOUS at 09:06

## 2021-06-04 RX ADMIN — ATORVASTATIN CALCIUM 80 MG: 20 TABLET, FILM COATED ORAL at 09:06

## 2021-06-04 RX ADMIN — SODIUM CHLORIDE AND POTASSIUM CHLORIDE: 9; 2.98 INJECTION, SOLUTION INTRAVENOUS at 07:06

## 2021-06-04 RX ADMIN — NICARDIPINE HYDROCHLORIDE 2.5 MG/HR: 0.2 INJECTION INTRAVENOUS at 05:06

## 2021-06-04 RX ADMIN — INSULIN ASPART 3 UNITS: 100 INJECTION, SOLUTION INTRAVENOUS; SUBCUTANEOUS at 09:06

## 2021-06-04 RX ADMIN — ONDANSETRON 4 MG: 2 INJECTION INTRAMUSCULAR; INTRAVENOUS at 02:06

## 2021-06-04 RX ADMIN — SODIUM CHLORIDE AND POTASSIUM CHLORIDE: 9; 2.98 INJECTION, SOLUTION INTRAVENOUS at 09:06

## 2021-06-04 RX ADMIN — LABETALOL HYDROCHLORIDE 10 MG: 5 INJECTION INTRAVENOUS at 09:06

## 2021-06-04 RX ADMIN — INSULIN DETEMIR 12 UNITS: 100 INJECTION, SOLUTION SUBCUTANEOUS at 09:06

## 2021-06-04 RX ADMIN — CLOPIDOGREL 75 MG: 75 TABLET, FILM COATED ORAL at 09:06

## 2021-06-04 RX ADMIN — AMLODIPINE BESYLATE 5 MG: 5 TABLET ORAL at 10:06

## 2021-06-04 RX ADMIN — DIAZEPAM 5 MG: 5 INJECTION, SOLUTION INTRAMUSCULAR; INTRAVENOUS at 02:06

## 2021-06-04 RX ADMIN — MUPIROCIN: 20 OINTMENT TOPICAL at 08:06

## 2021-06-04 RX ADMIN — LOSARTAN POTASSIUM 50 MG: 50 TABLET, FILM COATED ORAL at 12:06

## 2021-06-04 RX ADMIN — HYDRALAZINE HYDROCHLORIDE 10 MG: 20 INJECTION, SOLUTION INTRAMUSCULAR; INTRAVENOUS at 10:06

## 2021-06-04 RX ADMIN — OXYCODONE 5 MG: 5 TABLET ORAL at 12:06

## 2021-06-04 RX ADMIN — AMLODIPINE BESYLATE 5 MG: 5 TABLET ORAL at 07:06

## 2021-06-04 RX ADMIN — ASPIRIN 81 MG: 81 TABLET, COATED ORAL at 09:06

## 2021-06-04 RX ADMIN — SODIUM CHLORIDE AND POTASSIUM CHLORIDE: 9; 2.98 INJECTION, SOLUTION INTRAVENOUS at 12:06

## 2021-06-05 PROBLEM — E78.1 HYPERTRIGLYCERIDEMIA: Status: ACTIVE | Noted: 2021-06-05

## 2021-06-05 PROBLEM — E66.812 CLASS 2 OBESITY IN ADULT: Status: ACTIVE | Noted: 2021-06-05

## 2021-06-05 PROBLEM — I67.2 INTRACRANIAL ATHEROSCLEROSIS: Status: ACTIVE | Noted: 2021-06-05

## 2021-06-05 PROBLEM — E66.9 CLASS 2 OBESITY IN ADULT: Status: ACTIVE | Noted: 2021-06-05

## 2021-06-05 LAB
ALBUMIN SERPL BCP-MCNC: 3.5 G/DL (ref 3.5–5.2)
ALBUMIN SERPL BCP-MCNC: 3.8 G/DL (ref 3.5–5.2)
ALP SERPL-CCNC: 121 U/L (ref 55–135)
ALP SERPL-CCNC: 126 U/L (ref 55–135)
ALT SERPL W/O P-5'-P-CCNC: 22 U/L (ref 10–44)
ALT SERPL W/O P-5'-P-CCNC: 26 U/L (ref 10–44)
ANION GAP SERPL CALC-SCNC: 12 MMOL/L (ref 8–16)
ANION GAP SERPL CALC-SCNC: 13 MMOL/L (ref 8–16)
APTT BLDCRRT: 29.1 SEC (ref 21–32)
AST SERPL-CCNC: 23 U/L (ref 10–40)
AST SERPL-CCNC: 26 U/L (ref 10–40)
BASOPHILS # BLD AUTO: 0.02 K/UL (ref 0–0.2)
BASOPHILS # BLD AUTO: 0.03 K/UL (ref 0–0.2)
BASOPHILS NFR BLD: 0.3 % (ref 0–1.9)
BASOPHILS NFR BLD: 0.3 % (ref 0–1.9)
BILIRUB SERPL-MCNC: 1.1 MG/DL (ref 0.1–1)
BILIRUB SERPL-MCNC: 1.2 MG/DL (ref 0.1–1)
BUN SERPL-MCNC: 12 MG/DL (ref 6–20)
BUN SERPL-MCNC: 15 MG/DL (ref 6–20)
CALCIUM SERPL-MCNC: 10.1 MG/DL (ref 8.7–10.5)
CALCIUM SERPL-MCNC: 9.1 MG/DL (ref 8.7–10.5)
CHLORIDE SERPL-SCNC: 102 MMOL/L (ref 95–110)
CHLORIDE SERPL-SCNC: 99 MMOL/L (ref 95–110)
CO2 SERPL-SCNC: 17 MMOL/L (ref 23–29)
CO2 SERPL-SCNC: 21 MMOL/L (ref 23–29)
CREAT SERPL-MCNC: 1.2 MG/DL (ref 0.5–1.4)
CREAT SERPL-MCNC: 1.3 MG/DL (ref 0.5–1.4)
CREAT SERPL-MCNC: 1.4 MG/DL (ref 0.5–1.4)
DIFFERENTIAL METHOD: ABNORMAL
DIFFERENTIAL METHOD: ABNORMAL
EOSINOPHIL # BLD AUTO: 0.2 K/UL (ref 0–0.5)
EOSINOPHIL # BLD AUTO: 0.3 K/UL (ref 0–0.5)
EOSINOPHIL NFR BLD: 2.3 % (ref 0–8)
EOSINOPHIL NFR BLD: 2.9 % (ref 0–8)
ERYTHROCYTE [DISTWIDTH] IN BLOOD BY AUTOMATED COUNT: 13.2 % (ref 11.5–14.5)
ERYTHROCYTE [DISTWIDTH] IN BLOOD BY AUTOMATED COUNT: 13.3 % (ref 11.5–14.5)
EST. GFR  (AFRICAN AMERICAN): >60 ML/MIN/1.73 M^2
EST. GFR  (AFRICAN AMERICAN): >60 ML/MIN/1.73 M^2
EST. GFR  (NON AFRICAN AMERICAN): 57 ML/MIN/1.73 M^2
EST. GFR  (NON AFRICAN AMERICAN): >60 ML/MIN/1.73 M^2
GIANT PLATELETS BLD QL SMEAR: PRESENT
GLUCOSE SERPL-MCNC: 205 MG/DL (ref 70–110)
GLUCOSE SERPL-MCNC: 222 MG/DL (ref 70–110)
HCT VFR BLD AUTO: 47.5 % (ref 40–54)
HCT VFR BLD AUTO: 49.8 % (ref 40–54)
HGB BLD-MCNC: 15.9 G/DL (ref 14–18)
HGB BLD-MCNC: 17 G/DL (ref 14–18)
IMM GRANULOCYTES # BLD AUTO: 0.01 K/UL (ref 0–0.04)
IMM GRANULOCYTES # BLD AUTO: 0.01 K/UL (ref 0–0.04)
IMM GRANULOCYTES NFR BLD AUTO: 0.1 % (ref 0–0.5)
IMM GRANULOCYTES NFR BLD AUTO: 0.2 % (ref 0–0.5)
INR PPP: 1.1 (ref 0.8–1.2)
LYMPHOCYTES # BLD AUTO: 1.9 K/UL (ref 1–4.8)
LYMPHOCYTES # BLD AUTO: 2.8 K/UL (ref 1–4.8)
LYMPHOCYTES NFR BLD: 29.6 % (ref 18–48)
LYMPHOCYTES NFR BLD: 32 % (ref 18–48)
MAGNESIUM SERPL-MCNC: 1.8 MG/DL (ref 1.6–2.6)
MCH RBC QN AUTO: 27.1 PG (ref 27–31)
MCH RBC QN AUTO: 27.4 PG (ref 27–31)
MCHC RBC AUTO-ENTMCNC: 33.5 G/DL (ref 32–36)
MCHC RBC AUTO-ENTMCNC: 34.1 G/DL (ref 32–36)
MCV RBC AUTO: 80 FL (ref 82–98)
MCV RBC AUTO: 81 FL (ref 82–98)
MONOCYTES # BLD AUTO: 0.5 K/UL (ref 0.3–1)
MONOCYTES # BLD AUTO: 0.5 K/UL (ref 0.3–1)
MONOCYTES NFR BLD: 6.1 % (ref 4–15)
MONOCYTES NFR BLD: 7 % (ref 4–15)
NEUTROPHILS # BLD AUTO: 3.9 K/UL (ref 1.8–7.7)
NEUTROPHILS # BLD AUTO: 5.1 K/UL (ref 1.8–7.7)
NEUTROPHILS NFR BLD: 58.6 % (ref 38–73)
NEUTROPHILS NFR BLD: 60.6 % (ref 38–73)
NRBC BLD-RTO: 0 /100 WBC
NRBC BLD-RTO: 0 /100 WBC
PHOSPHATE SERPL-MCNC: 2.8 MG/DL (ref 2.7–4.5)
PLATELET # BLD AUTO: 240 K/UL (ref 150–450)
PLATELET # BLD AUTO: 277 K/UL (ref 150–450)
PLATELET BLD QL SMEAR: ABNORMAL
PMV BLD AUTO: 9.5 FL (ref 9.2–12.9)
PMV BLD AUTO: 9.7 FL (ref 9.2–12.9)
POC PTINR: 1.3 (ref 0.9–1.2)
POC PTWBT: 15.1 SEC (ref 9.7–14.3)
POCT GLUCOSE: 192 MG/DL (ref 70–110)
POCT GLUCOSE: 201 MG/DL (ref 70–110)
POCT GLUCOSE: 202 MG/DL (ref 70–110)
POCT GLUCOSE: 218 MG/DL (ref 70–110)
POTASSIUM SERPL-SCNC: 3.3 MMOL/L (ref 3.5–5.1)
POTASSIUM SERPL-SCNC: 3.8 MMOL/L (ref 3.5–5.1)
PROT SERPL-MCNC: 7.6 G/DL (ref 6–8.4)
PROT SERPL-MCNC: 8.1 G/DL (ref 6–8.4)
PROTHROMBIN TIME: 11.8 SEC (ref 9–12.5)
RBC # BLD AUTO: 5.87 M/UL (ref 4.6–6.2)
RBC # BLD AUTO: 6.21 M/UL (ref 4.6–6.2)
SAMPLE: ABNORMAL
SAMPLE: NORMAL
SODIUM SERPL-SCNC: 132 MMOL/L (ref 136–145)
SODIUM SERPL-SCNC: 132 MMOL/L (ref 136–145)
WBC # BLD AUTO: 6.42 K/UL (ref 3.9–12.7)
WBC # BLD AUTO: 8.7 K/UL (ref 3.9–12.7)

## 2021-06-05 PROCEDURE — 99900035 HC TECH TIME PER 15 MIN (STAT)

## 2021-06-05 PROCEDURE — 80053 COMPREHEN METABOLIC PANEL: CPT | Performed by: HOSPITALIST

## 2021-06-05 PROCEDURE — 85025 COMPLETE CBC W/AUTO DIFF WBC: CPT | Mod: 91 | Performed by: HOSPITALIST

## 2021-06-05 PROCEDURE — 85730 THROMBOPLASTIN TIME PARTIAL: CPT | Performed by: HOSPITALIST

## 2021-06-05 PROCEDURE — 99232 PR SUBSEQUENT HOSPITAL CARE,LEVL II: ICD-10-PCS | Mod: 95,,, | Performed by: PSYCHIATRY & NEUROLOGY

## 2021-06-05 PROCEDURE — 99233 SBSQ HOSP IP/OBS HIGH 50: CPT | Mod: 95,,, | Performed by: NURSE PRACTITIONER

## 2021-06-05 PROCEDURE — 36415 COLL VENOUS BLD VENIPUNCTURE: CPT | Performed by: HOSPITALIST

## 2021-06-05 PROCEDURE — 85610 PROTHROMBIN TIME: CPT

## 2021-06-05 PROCEDURE — 83735 ASSAY OF MAGNESIUM: CPT | Performed by: HOSPITALIST

## 2021-06-05 PROCEDURE — 99233 SBSQ HOSP IP/OBS HIGH 50: CPT | Mod: ,,, | Performed by: HOSPITALIST

## 2021-06-05 PROCEDURE — 82565 ASSAY OF CREATININE: CPT

## 2021-06-05 PROCEDURE — 63600175 PHARM REV CODE 636 W HCPCS: Performed by: HOSPITALIST

## 2021-06-05 PROCEDURE — 99233 PR SUBSEQUENT HOSPITAL CARE,LEVL III: ICD-10-PCS | Mod: ,,, | Performed by: HOSPITALIST

## 2021-06-05 PROCEDURE — 20000000 HC ICU ROOM

## 2021-06-05 PROCEDURE — 99232 SBSQ HOSP IP/OBS MODERATE 35: CPT | Mod: 95,,, | Performed by: PSYCHIATRY & NEUROLOGY

## 2021-06-05 PROCEDURE — 84100 ASSAY OF PHOSPHORUS: CPT | Performed by: HOSPITALIST

## 2021-06-05 PROCEDURE — 82803 BLOOD GASES ANY COMBINATION: CPT

## 2021-06-05 PROCEDURE — 99233 PR SUBSEQUENT HOSPITAL CARE,LEVL III: ICD-10-PCS | Mod: 95,,, | Performed by: NURSE PRACTITIONER

## 2021-06-05 PROCEDURE — 25000003 PHARM REV CODE 250: Performed by: HOSPITALIST

## 2021-06-05 PROCEDURE — 80053 COMPREHEN METABOLIC PANEL: CPT | Mod: 91 | Performed by: HOSPITALIST

## 2021-06-05 PROCEDURE — 85610 PROTHROMBIN TIME: CPT | Performed by: HOSPITALIST

## 2021-06-05 PROCEDURE — 94761 N-INVAS EAR/PLS OXIMETRY MLT: CPT

## 2021-06-05 PROCEDURE — 92610 EVALUATE SWALLOWING FUNCTION: CPT

## 2021-06-05 RX ORDER — INSULIN ASPART 100 [IU]/ML
3 INJECTION, SOLUTION INTRAVENOUS; SUBCUTANEOUS
Status: DISCONTINUED | OUTPATIENT
Start: 2021-06-05 | End: 2021-06-10 | Stop reason: HOSPADM

## 2021-06-05 RX ORDER — NIFEDIPINE 30 MG/1
30 TABLET, EXTENDED RELEASE ORAL DAILY
Status: DISCONTINUED | OUTPATIENT
Start: 2021-06-05 | End: 2021-06-06

## 2021-06-05 RX ADMIN — NICARDIPINE HYDROCHLORIDE 7.5 MG/HR: 0.2 INJECTION INTRAVENOUS at 03:06

## 2021-06-05 RX ADMIN — MUPIROCIN: 20 OINTMENT TOPICAL at 08:06

## 2021-06-05 RX ADMIN — ACETAMINOPHEN 1000 MG: 500 TABLET, FILM COATED ORAL at 03:06

## 2021-06-05 RX ADMIN — ACETAMINOPHEN 1000 MG: 500 TABLET, FILM COATED ORAL at 08:06

## 2021-06-05 RX ADMIN — INSULIN ASPART 2 UNITS: 100 INJECTION, SOLUTION INTRAVENOUS; SUBCUTANEOUS at 09:06

## 2021-06-05 RX ADMIN — NIFEDIPINE 30 MG: 30 TABLET, FILM COATED, EXTENDED RELEASE ORAL at 09:06

## 2021-06-05 RX ADMIN — INSULIN ASPART 2 UNITS: 100 INJECTION, SOLUTION INTRAVENOUS; SUBCUTANEOUS at 06:06

## 2021-06-05 RX ADMIN — INSULIN ASPART 3 UNITS: 100 INJECTION, SOLUTION INTRAVENOUS; SUBCUTANEOUS at 12:06

## 2021-06-05 RX ADMIN — ENOXAPARIN SODIUM 40 MG: 40 INJECTION SUBCUTANEOUS at 05:06

## 2021-06-05 RX ADMIN — CLOPIDOGREL 75 MG: 75 TABLET, FILM COATED ORAL at 09:06

## 2021-06-05 RX ADMIN — ATORVASTATIN CALCIUM 80 MG: 20 TABLET, FILM COATED ORAL at 09:06

## 2021-06-05 RX ADMIN — MUPIROCIN: 20 OINTMENT TOPICAL at 09:06

## 2021-06-05 RX ADMIN — INSULIN DETEMIR 12 UNITS: 100 INJECTION, SOLUTION SUBCUTANEOUS at 09:06

## 2021-06-05 RX ADMIN — ASPIRIN 81 MG: 81 TABLET, COATED ORAL at 09:06

## 2021-06-06 PROBLEM — I63.9 ACUTE ISCHEMIC STROKE: Status: ACTIVE | Noted: 2021-06-03

## 2021-06-06 LAB
ALBUMIN SERPL BCP-MCNC: 3.5 G/DL (ref 3.5–5.2)
ALP SERPL-CCNC: 115 U/L (ref 55–135)
ALT SERPL W/O P-5'-P-CCNC: 23 U/L (ref 10–44)
ANION GAP SERPL CALC-SCNC: 14 MMOL/L (ref 8–16)
AST SERPL-CCNC: 24 U/L (ref 10–40)
BASOPHILS # BLD AUTO: 0.03 K/UL (ref 0–0.2)
BASOPHILS NFR BLD: 0.4 % (ref 0–1.9)
BILIRUB SERPL-MCNC: 1.2 MG/DL (ref 0.1–1)
BUN SERPL-MCNC: 16 MG/DL (ref 6–20)
CALCIUM SERPL-MCNC: 9.6 MG/DL (ref 8.7–10.5)
CHLORIDE SERPL-SCNC: 100 MMOL/L (ref 95–110)
CO2 SERPL-SCNC: 19 MMOL/L (ref 23–29)
CREAT SERPL-MCNC: 1.3 MG/DL (ref 0.5–1.4)
DIFFERENTIAL METHOD: ABNORMAL
EOSINOPHIL # BLD AUTO: 0.2 K/UL (ref 0–0.5)
EOSINOPHIL NFR BLD: 2.3 % (ref 0–8)
ERYTHROCYTE [DISTWIDTH] IN BLOOD BY AUTOMATED COUNT: 13.2 % (ref 11.5–14.5)
EST. GFR  (AFRICAN AMERICAN): >60 ML/MIN/1.73 M^2
EST. GFR  (NON AFRICAN AMERICAN): >60 ML/MIN/1.73 M^2
GLUCOSE SERPL-MCNC: 191 MG/DL (ref 70–110)
HCT VFR BLD AUTO: 49.7 % (ref 40–54)
HGB BLD-MCNC: 16.7 G/DL (ref 14–18)
IMM GRANULOCYTES # BLD AUTO: 0.02 K/UL (ref 0–0.04)
IMM GRANULOCYTES NFR BLD AUTO: 0.3 % (ref 0–0.5)
LYMPHOCYTES # BLD AUTO: 1.8 K/UL (ref 1–4.8)
LYMPHOCYTES NFR BLD: 22.9 % (ref 18–48)
MAGNESIUM SERPL-MCNC: 1.9 MG/DL (ref 1.6–2.6)
MCH RBC QN AUTO: 27.6 PG (ref 27–31)
MCHC RBC AUTO-ENTMCNC: 33.6 G/DL (ref 32–36)
MCV RBC AUTO: 82 FL (ref 82–98)
MONOCYTES # BLD AUTO: 0.6 K/UL (ref 0.3–1)
MONOCYTES NFR BLD: 7.9 % (ref 4–15)
NEUTROPHILS # BLD AUTO: 5.2 K/UL (ref 1.8–7.7)
NEUTROPHILS NFR BLD: 66.2 % (ref 38–73)
NRBC BLD-RTO: 0 /100 WBC
PHOSPHATE SERPL-MCNC: 2.8 MG/DL (ref 2.7–4.5)
PLATELET # BLD AUTO: 236 K/UL (ref 150–450)
PLATELET BLD QL SMEAR: ABNORMAL
PMV BLD AUTO: 9.9 FL (ref 9.2–12.9)
POCT GLUCOSE: 160 MG/DL (ref 70–110)
POCT GLUCOSE: 180 MG/DL (ref 70–110)
POCT GLUCOSE: 184 MG/DL (ref 70–110)
POCT GLUCOSE: 196 MG/DL (ref 70–110)
POTASSIUM SERPL-SCNC: 3.5 MMOL/L (ref 3.5–5.1)
PROT SERPL-MCNC: 7.4 G/DL (ref 6–8.4)
RBC # BLD AUTO: 6.05 M/UL (ref 4.6–6.2)
SODIUM SERPL-SCNC: 133 MMOL/L (ref 136–145)
WBC # BLD AUTO: 7.86 K/UL (ref 3.9–12.7)

## 2021-06-06 PROCEDURE — 25000003 PHARM REV CODE 250: Performed by: HOSPITALIST

## 2021-06-06 PROCEDURE — 99233 SBSQ HOSP IP/OBS HIGH 50: CPT | Mod: ,,, | Performed by: HOSPITALIST

## 2021-06-06 PROCEDURE — 63600175 PHARM REV CODE 636 W HCPCS: Performed by: HOSPITALIST

## 2021-06-06 PROCEDURE — 99233 PR SUBSEQUENT HOSPITAL CARE,LEVL III: ICD-10-PCS | Mod: ,,, | Performed by: HOSPITALIST

## 2021-06-06 PROCEDURE — 84100 ASSAY OF PHOSPHORUS: CPT | Performed by: HOSPITALIST

## 2021-06-06 PROCEDURE — 20000000 HC ICU ROOM

## 2021-06-06 PROCEDURE — 80053 COMPREHEN METABOLIC PANEL: CPT | Performed by: HOSPITALIST

## 2021-06-06 PROCEDURE — 85025 COMPLETE CBC W/AUTO DIFF WBC: CPT | Performed by: HOSPITALIST

## 2021-06-06 PROCEDURE — 83735 ASSAY OF MAGNESIUM: CPT | Performed by: HOSPITALIST

## 2021-06-06 PROCEDURE — 99900035 HC TECH TIME PER 15 MIN (STAT)

## 2021-06-06 PROCEDURE — 94761 N-INVAS EAR/PLS OXIMETRY MLT: CPT

## 2021-06-06 PROCEDURE — 36415 COLL VENOUS BLD VENIPUNCTURE: CPT | Performed by: HOSPITALIST

## 2021-06-06 PROCEDURE — C9399 UNCLASSIFIED DRUGS OR BIOLOG: HCPCS | Performed by: HOSPITALIST

## 2021-06-06 RX ORDER — AMOXICILLIN 250 MG
1 CAPSULE ORAL 2 TIMES DAILY
Status: DISCONTINUED | OUTPATIENT
Start: 2021-06-06 | End: 2021-06-10 | Stop reason: HOSPADM

## 2021-06-06 RX ORDER — LOSARTAN POTASSIUM 25 MG/1
25 TABLET ORAL DAILY
Status: DISCONTINUED | OUTPATIENT
Start: 2021-06-06 | End: 2021-06-07

## 2021-06-06 RX ORDER — AMLODIPINE BESYLATE 5 MG/1
5 TABLET ORAL ONCE
Status: COMPLETED | OUTPATIENT
Start: 2021-06-06 | End: 2021-06-06

## 2021-06-06 RX ORDER — POLYETHYLENE GLYCOL 3350 17 G/17G
17 POWDER, FOR SOLUTION ORAL DAILY
Status: DISCONTINUED | OUTPATIENT
Start: 2021-06-06 | End: 2021-06-10 | Stop reason: HOSPADM

## 2021-06-06 RX ORDER — AMLODIPINE BESYLATE 5 MG/1
10 TABLET ORAL DAILY
Status: DISCONTINUED | OUTPATIENT
Start: 2021-06-07 | End: 2021-06-07

## 2021-06-06 RX ORDER — AMLODIPINE BESYLATE 5 MG/1
5 TABLET ORAL DAILY
Status: DISCONTINUED | OUTPATIENT
Start: 2021-06-06 | End: 2021-06-06

## 2021-06-06 RX ADMIN — AMLODIPINE BESYLATE 5 MG: 5 TABLET ORAL at 04:06

## 2021-06-06 RX ADMIN — INSULIN ASPART 3 UNITS: 100 INJECTION, SOLUTION INTRAVENOUS; SUBCUTANEOUS at 06:06

## 2021-06-06 RX ADMIN — ASPIRIN 81 MG: 81 TABLET, COATED ORAL at 09:06

## 2021-06-06 RX ADMIN — MUPIROCIN: 20 OINTMENT TOPICAL at 09:06

## 2021-06-06 RX ADMIN — INSULIN ASPART 3 UNITS: 100 INJECTION, SOLUTION INTRAVENOUS; SUBCUTANEOUS at 09:06

## 2021-06-06 RX ADMIN — INSULIN DETEMIR 12 UNITS: 100 INJECTION, SOLUTION SUBCUTANEOUS at 09:06

## 2021-06-06 RX ADMIN — LOSARTAN POTASSIUM 25 MG: 25 TABLET, FILM COATED ORAL at 11:06

## 2021-06-06 RX ADMIN — ATORVASTATIN CALCIUM 80 MG: 20 TABLET, FILM COATED ORAL at 09:06

## 2021-06-06 RX ADMIN — ENOXAPARIN SODIUM 40 MG: 40 INJECTION SUBCUTANEOUS at 04:06

## 2021-06-06 RX ADMIN — STANDARDIZED SENNA CONCENTRATE AND DOCUSATE SODIUM 1 TABLET: 8.6; 5 TABLET ORAL at 04:06

## 2021-06-06 RX ADMIN — POLYETHYLENE GLYCOL 3350 17 G: 17 POWDER, FOR SOLUTION ORAL at 04:06

## 2021-06-06 RX ADMIN — ACETAMINOPHEN 1000 MG: 500 TABLET, FILM COATED ORAL at 09:06

## 2021-06-06 RX ADMIN — AMLODIPINE BESYLATE 5 MG: 5 TABLET ORAL at 09:06

## 2021-06-06 RX ADMIN — CLOPIDOGREL 75 MG: 75 TABLET, FILM COATED ORAL at 09:06

## 2021-06-07 ENCOUNTER — PATIENT OUTREACH (OUTPATIENT)
Dept: ADMINISTRATIVE | Facility: OTHER | Age: 56
End: 2021-06-07

## 2021-06-07 LAB
ALBUMIN SERPL BCP-MCNC: 3.3 G/DL (ref 3.5–5.2)
ALP SERPL-CCNC: 117 U/L (ref 55–135)
ALT SERPL W/O P-5'-P-CCNC: 35 U/L (ref 10–44)
ANION GAP SERPL CALC-SCNC: 13 MMOL/L (ref 8–16)
AST SERPL-CCNC: 35 U/L (ref 10–40)
BASOPHILS # BLD AUTO: 0.02 K/UL (ref 0–0.2)
BASOPHILS NFR BLD: 0.2 % (ref 0–1.9)
BILIRUB SERPL-MCNC: 1.1 MG/DL (ref 0.1–1)
BUN SERPL-MCNC: 19 MG/DL (ref 6–20)
CALCIUM SERPL-MCNC: 8.9 MG/DL (ref 8.7–10.5)
CHLORIDE SERPL-SCNC: 101 MMOL/L (ref 95–110)
CO2 SERPL-SCNC: 19 MMOL/L (ref 23–29)
CREAT SERPL-MCNC: 1.3 MG/DL (ref 0.5–1.4)
DIFFERENTIAL METHOD: ABNORMAL
EOSINOPHIL # BLD AUTO: 0.1 K/UL (ref 0–0.5)
EOSINOPHIL NFR BLD: 1.3 % (ref 0–8)
ERYTHROCYTE [DISTWIDTH] IN BLOOD BY AUTOMATED COUNT: 13.2 % (ref 11.5–14.5)
EST. GFR  (AFRICAN AMERICAN): >60 ML/MIN/1.73 M^2
EST. GFR  (NON AFRICAN AMERICAN): >60 ML/MIN/1.73 M^2
GLUCOSE SERPL-MCNC: 185 MG/DL (ref 70–110)
HCT VFR BLD AUTO: 49.6 % (ref 40–54)
HGB BLD-MCNC: 16.6 G/DL (ref 14–18)
IMM GRANULOCYTES # BLD AUTO: 0.03 K/UL (ref 0–0.04)
IMM GRANULOCYTES NFR BLD AUTO: 0.3 % (ref 0–0.5)
LYMPHOCYTES # BLD AUTO: 1.5 K/UL (ref 1–4.8)
LYMPHOCYTES NFR BLD: 17.3 % (ref 18–48)
MAGNESIUM SERPL-MCNC: 1.8 MG/DL (ref 1.6–2.6)
MCH RBC QN AUTO: 27.1 PG (ref 27–31)
MCHC RBC AUTO-ENTMCNC: 33.5 G/DL (ref 32–36)
MCV RBC AUTO: 81 FL (ref 82–98)
MONOCYTES # BLD AUTO: 0.8 K/UL (ref 0.3–1)
MONOCYTES NFR BLD: 8.5 % (ref 4–15)
NEUTROPHILS # BLD AUTO: 6.4 K/UL (ref 1.8–7.7)
NEUTROPHILS NFR BLD: 72.4 % (ref 38–73)
NRBC BLD-RTO: 0 /100 WBC
PHOSPHATE SERPL-MCNC: 3.2 MG/DL (ref 2.7–4.5)
PLATELET # BLD AUTO: 249 K/UL (ref 150–450)
PMV BLD AUTO: 9.7 FL (ref 9.2–12.9)
POCT GLUCOSE: 162 MG/DL (ref 70–110)
POCT GLUCOSE: 176 MG/DL (ref 70–110)
POCT GLUCOSE: 197 MG/DL (ref 70–110)
POCT GLUCOSE: 235 MG/DL (ref 70–110)
POTASSIUM SERPL-SCNC: 3.5 MMOL/L (ref 3.5–5.1)
PROT SERPL-MCNC: 7.5 G/DL (ref 6–8.4)
RBC # BLD AUTO: 6.12 M/UL (ref 4.6–6.2)
SODIUM SERPL-SCNC: 133 MMOL/L (ref 136–145)
WBC # BLD AUTO: 8.91 K/UL (ref 3.9–12.7)

## 2021-06-07 PROCEDURE — 92507 TX SP LANG VOICE COMM INDIV: CPT

## 2021-06-07 PROCEDURE — 20000000 HC ICU ROOM

## 2021-06-07 PROCEDURE — 83735 ASSAY OF MAGNESIUM: CPT | Performed by: HOSPITALIST

## 2021-06-07 PROCEDURE — 97530 THERAPEUTIC ACTIVITIES: CPT

## 2021-06-07 PROCEDURE — 80053 COMPREHEN METABOLIC PANEL: CPT | Performed by: HOSPITALIST

## 2021-06-07 PROCEDURE — 36415 COLL VENOUS BLD VENIPUNCTURE: CPT | Performed by: HOSPITALIST

## 2021-06-07 PROCEDURE — 94761 N-INVAS EAR/PLS OXIMETRY MLT: CPT

## 2021-06-07 PROCEDURE — 97164 PT RE-EVAL EST PLAN CARE: CPT

## 2021-06-07 PROCEDURE — 84100 ASSAY OF PHOSPHORUS: CPT | Performed by: HOSPITALIST

## 2021-06-07 PROCEDURE — 25000003 PHARM REV CODE 250: Performed by: HOSPITALIST

## 2021-06-07 PROCEDURE — 92526 ORAL FUNCTION THERAPY: CPT

## 2021-06-07 PROCEDURE — 97112 NEUROMUSCULAR REEDUCATION: CPT

## 2021-06-07 PROCEDURE — 85025 COMPLETE CBC W/AUTO DIFF WBC: CPT | Performed by: HOSPITALIST

## 2021-06-07 PROCEDURE — 63600175 PHARM REV CODE 636 W HCPCS: Performed by: HOSPITALIST

## 2021-06-07 PROCEDURE — 99233 SBSQ HOSP IP/OBS HIGH 50: CPT | Mod: ,,, | Performed by: HOSPITALIST

## 2021-06-07 PROCEDURE — 99233 PR SUBSEQUENT HOSPITAL CARE,LEVL III: ICD-10-PCS | Mod: ,,, | Performed by: HOSPITALIST

## 2021-06-07 RX ORDER — LOSARTAN POTASSIUM 25 MG/1
25 TABLET ORAL ONCE
Status: COMPLETED | OUTPATIENT
Start: 2021-06-07 | End: 2021-06-07

## 2021-06-07 RX ORDER — LOSARTAN POTASSIUM 50 MG/1
50 TABLET ORAL DAILY
Status: DISCONTINUED | OUTPATIENT
Start: 2021-06-08 | End: 2021-06-10 | Stop reason: HOSPADM

## 2021-06-07 RX ORDER — CARVEDILOL 12.5 MG/1
12.5 TABLET ORAL 2 TIMES DAILY
Status: DISCONTINUED | OUTPATIENT
Start: 2021-06-07 | End: 2021-06-10 | Stop reason: HOSPADM

## 2021-06-07 RX ORDER — NIFEDIPINE 30 MG/1
30 TABLET, EXTENDED RELEASE ORAL 2 TIMES DAILY
Status: DISCONTINUED | OUTPATIENT
Start: 2021-06-07 | End: 2021-06-09

## 2021-06-07 RX ORDER — POTASSIUM CHLORIDE 20 MEQ/1
40 TABLET, EXTENDED RELEASE ORAL ONCE
Status: COMPLETED | OUTPATIENT
Start: 2021-06-07 | End: 2021-06-07

## 2021-06-07 RX ADMIN — CARVEDILOL 12.5 MG: 12.5 TABLET, FILM COATED ORAL at 06:06

## 2021-06-07 RX ADMIN — POTASSIUM CHLORIDE 40 MEQ: 1500 TABLET, EXTENDED RELEASE ORAL at 06:06

## 2021-06-07 RX ADMIN — AMLODIPINE BESYLATE 10 MG: 5 TABLET ORAL at 09:06

## 2021-06-07 RX ADMIN — ASPIRIN 81 MG: 81 TABLET, COATED ORAL at 09:06

## 2021-06-07 RX ADMIN — LOSARTAN POTASSIUM 25 MG: 25 TABLET, FILM COATED ORAL at 02:06

## 2021-06-07 RX ADMIN — INSULIN ASPART 2 UNITS: 100 INJECTION, SOLUTION INTRAVENOUS; SUBCUTANEOUS at 04:06

## 2021-06-07 RX ADMIN — MUPIROCIN: 20 OINTMENT TOPICAL at 10:06

## 2021-06-07 RX ADMIN — INSULIN ASPART 3 UNITS: 100 INJECTION, SOLUTION INTRAVENOUS; SUBCUTANEOUS at 12:06

## 2021-06-07 RX ADMIN — ATORVASTATIN CALCIUM 80 MG: 20 TABLET, FILM COATED ORAL at 09:06

## 2021-06-07 RX ADMIN — NIFEDIPINE 30 MG: 30 TABLET, FILM COATED, EXTENDED RELEASE ORAL at 02:06

## 2021-06-07 RX ADMIN — INSULIN ASPART 3 UNITS: 100 INJECTION, SOLUTION INTRAVENOUS; SUBCUTANEOUS at 04:06

## 2021-06-07 RX ADMIN — LOSARTAN POTASSIUM 25 MG: 25 TABLET, FILM COATED ORAL at 09:06

## 2021-06-07 RX ADMIN — INSULIN DETEMIR 15 UNITS: 100 INJECTION, SOLUTION SUBCUTANEOUS at 09:06

## 2021-06-07 RX ADMIN — CLOPIDOGREL 75 MG: 75 TABLET, FILM COATED ORAL at 09:06

## 2021-06-07 RX ADMIN — LABETALOL HYDROCHLORIDE 10 MG: 5 INJECTION INTRAVENOUS at 12:06

## 2021-06-07 RX ADMIN — MUPIROCIN: 20 OINTMENT TOPICAL at 09:06

## 2021-06-07 RX ADMIN — ENOXAPARIN SODIUM 40 MG: 40 INJECTION SUBCUTANEOUS at 05:06

## 2021-06-08 PROBLEM — I63.9 STROKE: Status: ACTIVE | Noted: 2021-06-08

## 2021-06-08 PROBLEM — E87.6 HYPOKALEMIA: Status: RESOLVED | Noted: 2021-06-03 | Resolved: 2021-06-08

## 2021-06-08 PROBLEM — G93.6 CYTOTOXIC CEREBRAL EDEMA: Status: RESOLVED | Noted: 2021-06-04 | Resolved: 2021-06-08

## 2021-06-08 PROBLEM — I67.2 INTRACRANIAL ATHEROSCLEROSIS: Status: RESOLVED | Noted: 2021-06-05 | Resolved: 2021-06-08

## 2021-06-08 PROBLEM — E78.1 HYPERTRIGLYCERIDEMIA: Status: RESOLVED | Noted: 2021-06-05 | Resolved: 2021-06-08

## 2021-06-08 PROBLEM — R53.1 RIGHT SIDED WEAKNESS: Status: RESOLVED | Noted: 2021-06-04 | Resolved: 2021-06-08

## 2021-06-08 PROBLEM — R47.1 DYSARTHRIA AND ANARTHRIA: Status: RESOLVED | Noted: 2021-06-04 | Resolved: 2021-06-08

## 2021-06-08 PROBLEM — I63.9 STROKE: Status: RESOLVED | Noted: 2021-06-04 | Resolved: 2021-06-08

## 2021-06-08 LAB
ALBUMIN SERPL BCP-MCNC: 3 G/DL (ref 3.5–5.2)
ALP SERPL-CCNC: 105 U/L (ref 55–135)
ALT SERPL W/O P-5'-P-CCNC: 35 U/L (ref 10–44)
ANION GAP SERPL CALC-SCNC: 9 MMOL/L (ref 8–16)
AST SERPL-CCNC: 32 U/L (ref 10–40)
BASOPHILS # BLD AUTO: 0.02 K/UL (ref 0–0.2)
BASOPHILS NFR BLD: 0.3 % (ref 0–1.9)
BILIRUB SERPL-MCNC: 1 MG/DL (ref 0.1–1)
BUN SERPL-MCNC: 20 MG/DL (ref 6–20)
CALCIUM SERPL-MCNC: 8.8 MG/DL (ref 8.7–10.5)
CHLORIDE SERPL-SCNC: 101 MMOL/L (ref 95–110)
CO2 SERPL-SCNC: 22 MMOL/L (ref 23–29)
CREAT SERPL-MCNC: 1.3 MG/DL (ref 0.5–1.4)
DIFFERENTIAL METHOD: ABNORMAL
EOSINOPHIL # BLD AUTO: 0.3 K/UL (ref 0–0.5)
EOSINOPHIL NFR BLD: 4.6 % (ref 0–8)
ERYTHROCYTE [DISTWIDTH] IN BLOOD BY AUTOMATED COUNT: 13.1 % (ref 11.5–14.5)
EST. GFR  (AFRICAN AMERICAN): >60 ML/MIN/1.73 M^2
EST. GFR  (NON AFRICAN AMERICAN): >60 ML/MIN/1.73 M^2
GLUCOSE SERPL-MCNC: 184 MG/DL (ref 70–110)
HCT VFR BLD AUTO: 46.1 % (ref 40–54)
HGB BLD-MCNC: 15.4 G/DL (ref 14–18)
IMM GRANULOCYTES # BLD AUTO: 0.01 K/UL (ref 0–0.04)
IMM GRANULOCYTES NFR BLD AUTO: 0.1 % (ref 0–0.5)
LYMPHOCYTES # BLD AUTO: 1.8 K/UL (ref 1–4.8)
LYMPHOCYTES NFR BLD: 25.5 % (ref 18–48)
MAGNESIUM SERPL-MCNC: 1.9 MG/DL (ref 1.6–2.6)
MCH RBC QN AUTO: 26.7 PG (ref 27–31)
MCHC RBC AUTO-ENTMCNC: 33.4 G/DL (ref 32–36)
MCV RBC AUTO: 80 FL (ref 82–98)
MONOCYTES # BLD AUTO: 0.8 K/UL (ref 0.3–1)
MONOCYTES NFR BLD: 11 % (ref 4–15)
NEUTROPHILS # BLD AUTO: 4.2 K/UL (ref 1.8–7.7)
NEUTROPHILS NFR BLD: 58.5 % (ref 38–73)
NRBC BLD-RTO: 0 /100 WBC
PHOSPHATE SERPL-MCNC: 3.4 MG/DL (ref 2.7–4.5)
PLATELET # BLD AUTO: 229 K/UL (ref 150–450)
PMV BLD AUTO: 9 FL (ref 9.2–12.9)
POCT GLUCOSE: 163 MG/DL (ref 70–110)
POCT GLUCOSE: 176 MG/DL (ref 70–110)
POCT GLUCOSE: 178 MG/DL (ref 70–110)
POCT GLUCOSE: 222 MG/DL (ref 70–110)
POTASSIUM SERPL-SCNC: 3.8 MMOL/L (ref 3.5–5.1)
PROT SERPL-MCNC: 7 G/DL (ref 6–8.4)
RBC # BLD AUTO: 5.76 M/UL (ref 4.6–6.2)
SODIUM SERPL-SCNC: 132 MMOL/L (ref 136–145)
WBC # BLD AUTO: 7.17 K/UL (ref 3.9–12.7)

## 2021-06-08 PROCEDURE — 84100 ASSAY OF PHOSPHORUS: CPT | Performed by: HOSPITALIST

## 2021-06-08 PROCEDURE — 94761 N-INVAS EAR/PLS OXIMETRY MLT: CPT

## 2021-06-08 PROCEDURE — 97530 THERAPEUTIC ACTIVITIES: CPT

## 2021-06-08 PROCEDURE — 97116 GAIT TRAINING THERAPY: CPT

## 2021-06-08 PROCEDURE — 99900035 HC TECH TIME PER 15 MIN (STAT)

## 2021-06-08 PROCEDURE — 97112 NEUROMUSCULAR REEDUCATION: CPT

## 2021-06-08 PROCEDURE — 11000001 HC ACUTE MED/SURG PRIVATE ROOM

## 2021-06-08 PROCEDURE — 99233 SBSQ HOSP IP/OBS HIGH 50: CPT | Mod: ,,, | Performed by: INTERNAL MEDICINE

## 2021-06-08 PROCEDURE — 80053 COMPREHEN METABOLIC PANEL: CPT | Performed by: HOSPITALIST

## 2021-06-08 PROCEDURE — 63600175 PHARM REV CODE 636 W HCPCS: Performed by: HOSPITALIST

## 2021-06-08 PROCEDURE — 25000003 PHARM REV CODE 250: Performed by: HOSPITALIST

## 2021-06-08 PROCEDURE — 99233 PR SUBSEQUENT HOSPITAL CARE,LEVL III: ICD-10-PCS | Mod: ,,, | Performed by: INTERNAL MEDICINE

## 2021-06-08 PROCEDURE — 25000003 PHARM REV CODE 250: Performed by: INTERNAL MEDICINE

## 2021-06-08 PROCEDURE — 85025 COMPLETE CBC W/AUTO DIFF WBC: CPT | Performed by: HOSPITALIST

## 2021-06-08 PROCEDURE — 97110 THERAPEUTIC EXERCISES: CPT

## 2021-06-08 PROCEDURE — 36415 COLL VENOUS BLD VENIPUNCTURE: CPT | Performed by: HOSPITALIST

## 2021-06-08 PROCEDURE — 92507 TX SP LANG VOICE COMM INDIV: CPT

## 2021-06-08 PROCEDURE — 63600175 PHARM REV CODE 636 W HCPCS: Performed by: INTERNAL MEDICINE

## 2021-06-08 PROCEDURE — 27000221 HC OXYGEN, UP TO 24 HOURS

## 2021-06-08 PROCEDURE — 83735 ASSAY OF MAGNESIUM: CPT | Performed by: HOSPITALIST

## 2021-06-08 PROCEDURE — C9399 UNCLASSIFIED DRUGS OR BIOLOG: HCPCS | Performed by: HOSPITALIST

## 2021-06-08 PROCEDURE — 97535 SELF CARE MNGMENT TRAINING: CPT

## 2021-06-08 RX ADMIN — MUPIROCIN: 20 OINTMENT TOPICAL at 08:06

## 2021-06-08 RX ADMIN — STANDARDIZED SENNA CONCENTRATE AND DOCUSATE SODIUM 1 TABLET: 8.6; 5 TABLET ORAL at 09:06

## 2021-06-08 RX ADMIN — CARVEDILOL 12.5 MG: 12.5 TABLET, FILM COATED ORAL at 09:06

## 2021-06-08 RX ADMIN — LOSARTAN POTASSIUM 50 MG: 50 TABLET, FILM COATED ORAL at 10:06

## 2021-06-08 RX ADMIN — INSULIN ASPART 3 UNITS: 100 INJECTION, SOLUTION INTRAVENOUS; SUBCUTANEOUS at 08:06

## 2021-06-08 RX ADMIN — INSULIN ASPART 3 UNITS: 100 INJECTION, SOLUTION INTRAVENOUS; SUBCUTANEOUS at 05:06

## 2021-06-08 RX ADMIN — ASPIRIN 81 MG: 81 TABLET, COATED ORAL at 08:06

## 2021-06-08 RX ADMIN — NIFEDIPINE 30 MG: 30 TABLET, FILM COATED, EXTENDED RELEASE ORAL at 09:06

## 2021-06-08 RX ADMIN — MUPIROCIN: 20 OINTMENT TOPICAL at 09:06

## 2021-06-08 RX ADMIN — ATORVASTATIN CALCIUM 80 MG: 20 TABLET, FILM COATED ORAL at 08:06

## 2021-06-08 RX ADMIN — CLOPIDOGREL 75 MG: 75 TABLET, FILM COATED ORAL at 08:06

## 2021-06-08 RX ADMIN — INSULIN DETEMIR 15 UNITS: 100 INJECTION, SOLUTION SUBCUTANEOUS at 08:06

## 2021-06-08 RX ADMIN — INSULIN ASPART 1 UNITS: 100 INJECTION, SOLUTION INTRAVENOUS; SUBCUTANEOUS at 09:06

## 2021-06-08 RX ADMIN — INSULIN ASPART 3 UNITS: 100 INJECTION, SOLUTION INTRAVENOUS; SUBCUTANEOUS at 11:06

## 2021-06-08 RX ADMIN — CARVEDILOL 12.5 MG: 12.5 TABLET, FILM COATED ORAL at 10:06

## 2021-06-08 RX ADMIN — ENOXAPARIN SODIUM 40 MG: 40 INJECTION SUBCUTANEOUS at 05:06

## 2021-06-09 PROBLEM — N18.4 CKD (CHRONIC KIDNEY DISEASE), STAGE IV: Status: ACTIVE | Noted: 2021-06-09

## 2021-06-09 PROBLEM — N18.30 STAGE 3 CHRONIC KIDNEY DISEASE: Status: ACTIVE | Noted: 2021-06-09

## 2021-06-09 PROBLEM — I63.9 STROKE: Status: RESOLVED | Noted: 2021-06-08 | Resolved: 2021-06-09

## 2021-06-09 LAB
ALBUMIN SERPL BCP-MCNC: 2.9 G/DL (ref 3.5–5.2)
ALP SERPL-CCNC: 105 U/L (ref 55–135)
ALT SERPL W/O P-5'-P-CCNC: 37 U/L (ref 10–44)
ANION GAP SERPL CALC-SCNC: 10 MMOL/L (ref 8–16)
AST SERPL-CCNC: 35 U/L (ref 10–40)
BASOPHILS # BLD AUTO: 0.02 K/UL (ref 0–0.2)
BASOPHILS NFR BLD: 0.3 % (ref 0–1.9)
BILIRUB SERPL-MCNC: 0.8 MG/DL (ref 0.1–1)
BUN SERPL-MCNC: 21 MG/DL (ref 6–20)
CALCIUM SERPL-MCNC: 8.8 MG/DL (ref 8.7–10.5)
CHLORIDE SERPL-SCNC: 102 MMOL/L (ref 95–110)
CO2 SERPL-SCNC: 21 MMOL/L (ref 23–29)
CREAT SERPL-MCNC: 1.2 MG/DL (ref 0.5–1.4)
DIFFERENTIAL METHOD: ABNORMAL
EOSINOPHIL # BLD AUTO: 0.3 K/UL (ref 0–0.5)
EOSINOPHIL NFR BLD: 5 % (ref 0–8)
ERYTHROCYTE [DISTWIDTH] IN BLOOD BY AUTOMATED COUNT: 13 % (ref 11.5–14.5)
EST. GFR  (AFRICAN AMERICAN): >60 ML/MIN/1.73 M^2
EST. GFR  (NON AFRICAN AMERICAN): >60 ML/MIN/1.73 M^2
GLUCOSE SERPL-MCNC: 154 MG/DL (ref 70–110)
HCT VFR BLD AUTO: 44 % (ref 40–54)
HGB BLD-MCNC: 14.9 G/DL (ref 14–18)
IMM GRANULOCYTES # BLD AUTO: 0.02 K/UL (ref 0–0.04)
IMM GRANULOCYTES NFR BLD AUTO: 0.3 % (ref 0–0.5)
LYMPHOCYTES # BLD AUTO: 2 K/UL (ref 1–4.8)
LYMPHOCYTES NFR BLD: 34.9 % (ref 18–48)
MAGNESIUM SERPL-MCNC: 1.9 MG/DL (ref 1.6–2.6)
MCH RBC QN AUTO: 27.2 PG (ref 27–31)
MCHC RBC AUTO-ENTMCNC: 33.9 G/DL (ref 32–36)
MCV RBC AUTO: 80 FL (ref 82–98)
MONOCYTES # BLD AUTO: 0.6 K/UL (ref 0.3–1)
MONOCYTES NFR BLD: 9.5 % (ref 4–15)
NEUTROPHILS # BLD AUTO: 2.9 K/UL (ref 1.8–7.7)
NEUTROPHILS NFR BLD: 50 % (ref 38–73)
NRBC BLD-RTO: 0 /100 WBC
PHOSPHATE SERPL-MCNC: 3.7 MG/DL (ref 2.7–4.5)
PLATELET # BLD AUTO: 237 K/UL (ref 150–450)
PMV BLD AUTO: 9.3 FL (ref 9.2–12.9)
POCT GLUCOSE: 143 MG/DL (ref 70–110)
POCT GLUCOSE: 155 MG/DL (ref 70–110)
POCT GLUCOSE: 175 MG/DL (ref 70–110)
POCT GLUCOSE: 189 MG/DL (ref 70–110)
POTASSIUM SERPL-SCNC: 3.6 MMOL/L (ref 3.5–5.1)
PROT SERPL-MCNC: 6.8 G/DL (ref 6–8.4)
RBC # BLD AUTO: 5.47 M/UL (ref 4.6–6.2)
SODIUM SERPL-SCNC: 133 MMOL/L (ref 136–145)
WBC # BLD AUTO: 5.79 K/UL (ref 3.9–12.7)

## 2021-06-09 PROCEDURE — 11000001 HC ACUTE MED/SURG PRIVATE ROOM

## 2021-06-09 PROCEDURE — 84100 ASSAY OF PHOSPHORUS: CPT | Performed by: INTERNAL MEDICINE

## 2021-06-09 PROCEDURE — 36415 COLL VENOUS BLD VENIPUNCTURE: CPT | Performed by: INTERNAL MEDICINE

## 2021-06-09 PROCEDURE — 63600175 PHARM REV CODE 636 W HCPCS: Performed by: INTERNAL MEDICINE

## 2021-06-09 PROCEDURE — 92507 TX SP LANG VOICE COMM INDIV: CPT

## 2021-06-09 PROCEDURE — 94761 N-INVAS EAR/PLS OXIMETRY MLT: CPT

## 2021-06-09 PROCEDURE — 25000003 PHARM REV CODE 250: Performed by: INTERNAL MEDICINE

## 2021-06-09 PROCEDURE — 97110 THERAPEUTIC EXERCISES: CPT | Mod: CQ

## 2021-06-09 PROCEDURE — 99233 PR SUBSEQUENT HOSPITAL CARE,LEVL III: ICD-10-PCS | Mod: ,,, | Performed by: INTERNAL MEDICINE

## 2021-06-09 PROCEDURE — 80053 COMPREHEN METABOLIC PANEL: CPT | Performed by: INTERNAL MEDICINE

## 2021-06-09 PROCEDURE — 85025 COMPLETE CBC W/AUTO DIFF WBC: CPT | Performed by: INTERNAL MEDICINE

## 2021-06-09 PROCEDURE — 97530 THERAPEUTIC ACTIVITIES: CPT | Mod: CQ

## 2021-06-09 PROCEDURE — C9399 UNCLASSIFIED DRUGS OR BIOLOG: HCPCS | Performed by: INTERNAL MEDICINE

## 2021-06-09 PROCEDURE — 99233 SBSQ HOSP IP/OBS HIGH 50: CPT | Mod: ,,, | Performed by: INTERNAL MEDICINE

## 2021-06-09 PROCEDURE — 83735 ASSAY OF MAGNESIUM: CPT | Performed by: INTERNAL MEDICINE

## 2021-06-09 RX ORDER — NIFEDIPINE 60 MG/1
60 TABLET, EXTENDED RELEASE ORAL DAILY
Qty: 30 TABLET | Refills: 11
Start: 2021-06-10 | End: 2021-06-10 | Stop reason: HOSPADM

## 2021-06-09 RX ORDER — ATORVASTATIN CALCIUM 80 MG/1
80 TABLET, FILM COATED ORAL DAILY
Qty: 30 TABLET | Refills: 1
Start: 2021-06-10 | End: 2021-11-16 | Stop reason: SDUPTHER

## 2021-06-09 RX ORDER — AMOXICILLIN 250 MG
1 CAPSULE ORAL DAILY
Start: 2021-06-09 | End: 2021-07-01

## 2021-06-09 RX ORDER — CLOPIDOGREL BISULFATE 75 MG/1
75 TABLET ORAL DAILY
Qty: 30 TABLET | Refills: 11
Start: 2021-06-10 | End: 2021-12-03 | Stop reason: SDUPTHER

## 2021-06-09 RX ORDER — LOSARTAN POTASSIUM 50 MG/1
50 TABLET ORAL DAILY
Qty: 90 TABLET | Refills: 3 | Status: ON HOLD
Start: 2021-06-10 | End: 2021-07-16 | Stop reason: HOSPADM

## 2021-06-09 RX ORDER — INSULIN ASPART 100 [IU]/ML
3 INJECTION, SOLUTION INTRAVENOUS; SUBCUTANEOUS 3 TIMES DAILY
Refills: 0
Start: 2021-06-09 | End: 2021-07-01

## 2021-06-09 RX ORDER — INSULIN ASPART 100 [IU]/ML
0-5 INJECTION, SOLUTION INTRAVENOUS; SUBCUTANEOUS
Refills: 0
Start: 2021-06-09 | End: 2021-07-01

## 2021-06-09 RX ORDER — ASPIRIN 81 MG/1
81 TABLET ORAL DAILY
Refills: 0 | Status: ON HOLD
Start: 2021-06-10 | End: 2021-07-16 | Stop reason: HOSPADM

## 2021-06-09 RX ORDER — CARVEDILOL 12.5 MG/1
12.5 TABLET ORAL 2 TIMES DAILY
Qty: 60 TABLET | Refills: 11
Start: 2021-06-09 | End: 2022-06-09

## 2021-06-09 RX ORDER — NIFEDIPINE 30 MG/1
60 TABLET, EXTENDED RELEASE ORAL DAILY
Status: DISCONTINUED | OUTPATIENT
Start: 2021-06-09 | End: 2021-06-10

## 2021-06-09 RX ADMIN — INSULIN ASPART 3 UNITS: 100 INJECTION, SOLUTION INTRAVENOUS; SUBCUTANEOUS at 12:06

## 2021-06-09 RX ADMIN — ASPIRIN 81 MG: 81 TABLET, COATED ORAL at 08:06

## 2021-06-09 RX ADMIN — INSULIN ASPART 3 UNITS: 100 INJECTION, SOLUTION INTRAVENOUS; SUBCUTANEOUS at 08:06

## 2021-06-09 RX ADMIN — CARVEDILOL 12.5 MG: 12.5 TABLET, FILM COATED ORAL at 08:06

## 2021-06-09 RX ADMIN — MUPIROCIN: 20 OINTMENT TOPICAL at 08:06

## 2021-06-09 RX ADMIN — ENOXAPARIN SODIUM 40 MG: 40 INJECTION SUBCUTANEOUS at 05:06

## 2021-06-09 RX ADMIN — NIFEDIPINE 60 MG: 30 TABLET, FILM COATED, EXTENDED RELEASE ORAL at 08:06

## 2021-06-09 RX ADMIN — ATORVASTATIN CALCIUM 80 MG: 20 TABLET, FILM COATED ORAL at 08:06

## 2021-06-09 RX ADMIN — LOSARTAN POTASSIUM 50 MG: 50 TABLET, FILM COATED ORAL at 08:06

## 2021-06-09 RX ADMIN — INSULIN ASPART 3 UNITS: 100 INJECTION, SOLUTION INTRAVENOUS; SUBCUTANEOUS at 05:06

## 2021-06-09 RX ADMIN — CLOPIDOGREL 75 MG: 75 TABLET, FILM COATED ORAL at 08:06

## 2021-06-09 RX ADMIN — INSULIN DETEMIR 15 UNITS: 100 INJECTION, SOLUTION SUBCUTANEOUS at 08:06

## 2021-06-10 VITALS
SYSTOLIC BLOOD PRESSURE: 155 MMHG | HEART RATE: 80 BPM | DIASTOLIC BLOOD PRESSURE: 75 MMHG | HEIGHT: 68 IN | WEIGHT: 233.25 LBS | OXYGEN SATURATION: 94 % | TEMPERATURE: 97 F | BODY MASS INDEX: 35.35 KG/M2 | RESPIRATION RATE: 18 BRPM

## 2021-06-10 LAB
ALBUMIN SERPL BCP-MCNC: 3.2 G/DL (ref 3.5–5.2)
ALP SERPL-CCNC: 109 U/L (ref 55–135)
ALT SERPL W/O P-5'-P-CCNC: 42 U/L (ref 10–44)
ANION GAP SERPL CALC-SCNC: 10 MMOL/L (ref 8–16)
AST SERPL-CCNC: 39 U/L (ref 10–40)
BASOPHILS # BLD AUTO: 0.04 K/UL (ref 0–0.2)
BASOPHILS NFR BLD: 0.8 % (ref 0–1.9)
BILIRUB SERPL-MCNC: 0.8 MG/DL (ref 0.1–1)
BUN SERPL-MCNC: 17 MG/DL (ref 6–20)
CALCIUM SERPL-MCNC: 9.5 MG/DL (ref 8.7–10.5)
CHLORIDE SERPL-SCNC: 100 MMOL/L (ref 95–110)
CO2 SERPL-SCNC: 23 MMOL/L (ref 23–29)
CREAT SERPL-MCNC: 1.1 MG/DL (ref 0.5–1.4)
DIFFERENTIAL METHOD: ABNORMAL
EOSINOPHIL # BLD AUTO: 0.3 K/UL (ref 0–0.5)
EOSINOPHIL NFR BLD: 5.9 % (ref 0–8)
ERYTHROCYTE [DISTWIDTH] IN BLOOD BY AUTOMATED COUNT: 12.8 % (ref 11.5–14.5)
EST. GFR  (AFRICAN AMERICAN): >60 ML/MIN/1.73 M^2
EST. GFR  (NON AFRICAN AMERICAN): >60 ML/MIN/1.73 M^2
GLUCOSE SERPL-MCNC: 135 MG/DL (ref 70–110)
HCT VFR BLD AUTO: 45.8 % (ref 40–54)
HGB BLD-MCNC: 15.3 G/DL (ref 14–18)
IMM GRANULOCYTES # BLD AUTO: 0.01 K/UL (ref 0–0.04)
IMM GRANULOCYTES NFR BLD AUTO: 0.2 % (ref 0–0.5)
LYMPHOCYTES # BLD AUTO: 1.9 K/UL (ref 1–4.8)
LYMPHOCYTES NFR BLD: 37 % (ref 18–48)
MAGNESIUM SERPL-MCNC: 3.1 MG/DL (ref 1.6–2.6)
MCH RBC QN AUTO: 26.8 PG (ref 27–31)
MCHC RBC AUTO-ENTMCNC: 33.4 G/DL (ref 32–36)
MCV RBC AUTO: 80 FL (ref 82–98)
MONOCYTES # BLD AUTO: 0.5 K/UL (ref 0.3–1)
MONOCYTES NFR BLD: 10.3 % (ref 4–15)
NEUTROPHILS # BLD AUTO: 2.3 K/UL (ref 1.8–7.7)
NEUTROPHILS NFR BLD: 45.8 % (ref 38–73)
NRBC BLD-RTO: 0 /100 WBC
PHOSPHATE SERPL-MCNC: 3.2 MG/DL (ref 2.7–4.5)
PLATELET # BLD AUTO: 258 K/UL (ref 150–450)
PMV BLD AUTO: 9.4 FL (ref 9.2–12.9)
POCT GLUCOSE: 143 MG/DL (ref 70–110)
POCT GLUCOSE: 164 MG/DL (ref 70–110)
POTASSIUM SERPL-SCNC: 3.8 MMOL/L (ref 3.5–5.1)
PROT SERPL-MCNC: 7.1 G/DL (ref 6–8.4)
RBC # BLD AUTO: 5.7 M/UL (ref 4.6–6.2)
SODIUM SERPL-SCNC: 133 MMOL/L (ref 136–145)
WBC # BLD AUTO: 5.05 K/UL (ref 3.9–12.7)

## 2021-06-10 PROCEDURE — 97110 THERAPEUTIC EXERCISES: CPT | Mod: CQ

## 2021-06-10 PROCEDURE — 25000003 PHARM REV CODE 250: Performed by: INTERNAL MEDICINE

## 2021-06-10 PROCEDURE — 85025 COMPLETE CBC W/AUTO DIFF WBC: CPT | Performed by: INTERNAL MEDICINE

## 2021-06-10 PROCEDURE — 83735 ASSAY OF MAGNESIUM: CPT | Performed by: INTERNAL MEDICINE

## 2021-06-10 PROCEDURE — 97116 GAIT TRAINING THERAPY: CPT | Mod: CQ

## 2021-06-10 PROCEDURE — 80053 COMPREHEN METABOLIC PANEL: CPT | Performed by: INTERNAL MEDICINE

## 2021-06-10 PROCEDURE — 99239 HOSP IP/OBS DSCHRG MGMT >30: CPT | Mod: ,,, | Performed by: INTERNAL MEDICINE

## 2021-06-10 PROCEDURE — 84100 ASSAY OF PHOSPHORUS: CPT | Performed by: INTERNAL MEDICINE

## 2021-06-10 PROCEDURE — 36415 COLL VENOUS BLD VENIPUNCTURE: CPT | Performed by: INTERNAL MEDICINE

## 2021-06-10 PROCEDURE — 97535 SELF CARE MNGMENT TRAINING: CPT

## 2021-06-10 PROCEDURE — 97112 NEUROMUSCULAR REEDUCATION: CPT

## 2021-06-10 PROCEDURE — 99239 PR HOSPITAL DISCHARGE DAY,>30 MIN: ICD-10-PCS | Mod: ,,, | Performed by: INTERNAL MEDICINE

## 2021-06-10 PROCEDURE — 63600175 PHARM REV CODE 636 W HCPCS: Performed by: INTERNAL MEDICINE

## 2021-06-10 PROCEDURE — 92507 TX SP LANG VOICE COMM INDIV: CPT

## 2021-06-10 RX ORDER — NIFEDIPINE 90 MG/1
90 TABLET, EXTENDED RELEASE ORAL DAILY
Qty: 30 TABLET | Refills: 1 | Status: ON HOLD | OUTPATIENT
Start: 2021-06-11 | End: 2021-07-16 | Stop reason: HOSPADM

## 2021-06-10 RX ORDER — NIFEDIPINE 30 MG/1
90 TABLET, EXTENDED RELEASE ORAL DAILY
Status: DISCONTINUED | OUTPATIENT
Start: 2021-06-10 | End: 2021-06-10 | Stop reason: HOSPADM

## 2021-06-10 RX ADMIN — POLYETHYLENE GLYCOL 3350 17 G: 17 POWDER, FOR SOLUTION ORAL at 01:06

## 2021-06-10 RX ADMIN — INSULIN ASPART 3 UNITS: 100 INJECTION, SOLUTION INTRAVENOUS; SUBCUTANEOUS at 12:06

## 2021-06-10 RX ADMIN — INSULIN ASPART 3 UNITS: 100 INJECTION, SOLUTION INTRAVENOUS; SUBCUTANEOUS at 05:06

## 2021-06-10 RX ADMIN — ENOXAPARIN SODIUM 40 MG: 40 INJECTION SUBCUTANEOUS at 05:06

## 2021-06-10 RX ADMIN — CARVEDILOL 12.5 MG: 12.5 TABLET, FILM COATED ORAL at 08:06

## 2021-06-10 RX ADMIN — INSULIN DETEMIR 15 UNITS: 100 INJECTION, SOLUTION SUBCUTANEOUS at 08:06

## 2021-06-10 RX ADMIN — CLOPIDOGREL 75 MG: 75 TABLET, FILM COATED ORAL at 08:06

## 2021-06-10 RX ADMIN — INSULIN ASPART 3 UNITS: 100 INJECTION, SOLUTION INTRAVENOUS; SUBCUTANEOUS at 08:06

## 2021-06-10 RX ADMIN — ATORVASTATIN CALCIUM 80 MG: 20 TABLET, FILM COATED ORAL at 08:06

## 2021-06-10 RX ADMIN — LOSARTAN POTASSIUM 50 MG: 50 TABLET, FILM COATED ORAL at 08:06

## 2021-06-10 RX ADMIN — OXYCODONE 5 MG: 5 TABLET ORAL at 04:06

## 2021-06-10 RX ADMIN — NIFEDIPINE 90 MG: 30 TABLET, FILM COATED, EXTENDED RELEASE ORAL at 08:06

## 2021-06-10 RX ADMIN — ASPIRIN 81 MG: 81 TABLET, COATED ORAL at 08:06

## 2021-07-01 ENCOUNTER — OFFICE VISIT (OUTPATIENT)
Dept: INTERNAL MEDICINE | Facility: CLINIC | Age: 56
End: 2021-07-01
Attending: FAMILY MEDICINE
Payer: MEDICAID

## 2021-07-01 VITALS
OXYGEN SATURATION: 96 % | BODY MASS INDEX: 32.31 KG/M2 | HEART RATE: 78 BPM | SYSTOLIC BLOOD PRESSURE: 100 MMHG | DIASTOLIC BLOOD PRESSURE: 74 MMHG | WEIGHT: 213.19 LBS | HEIGHT: 68 IN

## 2021-07-01 DIAGNOSIS — I10 ESSENTIAL HYPERTENSION: ICD-10-CM

## 2021-07-01 DIAGNOSIS — N18.31 STAGE 3A CHRONIC KIDNEY DISEASE: ICD-10-CM

## 2021-07-01 DIAGNOSIS — I63.9 ACUTE ISCHEMIC STROKE: Primary | ICD-10-CM

## 2021-07-01 DIAGNOSIS — E11.9 TYPE 2 DIABETES MELLITUS WITHOUT COMPLICATION, WITHOUT LONG-TERM CURRENT USE OF INSULIN: ICD-10-CM

## 2021-07-01 PROCEDURE — 99214 OFFICE O/P EST MOD 30 MIN: CPT | Mod: S$PBB,,, | Performed by: FAMILY MEDICINE

## 2021-07-01 PROCEDURE — 99999 PR PBB SHADOW E&M-EST. PATIENT-LVL III: CPT | Mod: PBBFAC,,, | Performed by: FAMILY MEDICINE

## 2021-07-01 PROCEDURE — 99214 PR OFFICE/OUTPT VISIT, EST, LEVL IV, 30-39 MIN: ICD-10-PCS | Mod: S$PBB,,, | Performed by: FAMILY MEDICINE

## 2021-07-01 PROCEDURE — 99213 OFFICE O/P EST LOW 20 MIN: CPT | Mod: PBBFAC | Performed by: FAMILY MEDICINE

## 2021-07-01 PROCEDURE — 99999 PR PBB SHADOW E&M-EST. PATIENT-LVL III: ICD-10-PCS | Mod: PBBFAC,,, | Performed by: FAMILY MEDICINE

## 2021-07-01 RX ORDER — DOCUSATE SODIUM 100 MG/1
CAPSULE, LIQUID FILLED ORAL
Status: ON HOLD | COMMUNITY
Start: 2021-06-27 | End: 2021-07-16 | Stop reason: SDUPTHER

## 2021-07-01 RX ORDER — GLIMEPIRIDE 2 MG/1
2 TABLET ORAL EVERY MORNING
Status: ON HOLD | COMMUNITY
Start: 2021-06-24 | End: 2021-08-10 | Stop reason: HOSPADM

## 2021-07-01 RX ORDER — HYDROCHLOROTHIAZIDE 25 MG/1
25 TABLET ORAL DAILY
COMMUNITY
Start: 2021-06-24 | End: 2021-07-01

## 2021-07-06 ENCOUNTER — TELEPHONE (OUTPATIENT)
Dept: INTERNAL MEDICINE | Facility: CLINIC | Age: 56
End: 2021-07-06

## 2021-07-06 ENCOUNTER — HOSPITAL ENCOUNTER (EMERGENCY)
Facility: OTHER | Age: 56
Discharge: HOME | End: 2021-07-06
Attending: EMERGENCY MEDICINE
Payer: MEDICAID

## 2021-07-06 VITALS
DIASTOLIC BLOOD PRESSURE: 95 MMHG | TEMPERATURE: 99 F | RESPIRATION RATE: 13 BRPM | OXYGEN SATURATION: 98 % | HEART RATE: 77 BPM | SYSTOLIC BLOOD PRESSURE: 165 MMHG

## 2021-07-06 DIAGNOSIS — T78.3XXA ANGIOEDEMA, INITIAL ENCOUNTER: Primary | ICD-10-CM

## 2021-07-06 PROCEDURE — 63600175 PHARM REV CODE 636 W HCPCS: Performed by: NURSE PRACTITIONER

## 2021-07-06 PROCEDURE — 96375 TX/PRO/DX INJ NEW DRUG ADDON: CPT

## 2021-07-06 PROCEDURE — 99284 EMERGENCY DEPT VISIT MOD MDM: CPT | Mod: 25

## 2021-07-06 PROCEDURE — 96374 THER/PROPH/DIAG INJ IV PUSH: CPT

## 2021-07-06 PROCEDURE — 25000003 PHARM REV CODE 250: Performed by: NURSE PRACTITIONER

## 2021-07-06 RX ORDER — METHYLPREDNISOLONE SOD SUCC 125 MG
125 VIAL (EA) INJECTION
Status: COMPLETED | OUTPATIENT
Start: 2021-07-06 | End: 2021-07-06

## 2021-07-06 RX ORDER — DIPHENHYDRAMINE HYDROCHLORIDE 50 MG/ML
50 INJECTION INTRAMUSCULAR; INTRAVENOUS
Status: COMPLETED | OUTPATIENT
Start: 2021-07-06 | End: 2021-07-06

## 2021-07-06 RX ORDER — FAMOTIDINE 10 MG/ML
20 INJECTION INTRAVENOUS
Status: COMPLETED | OUTPATIENT
Start: 2021-07-06 | End: 2021-07-06

## 2021-07-06 RX ADMIN — FAMOTIDINE 20 MG: 10 INJECTION INTRAVENOUS at 03:07

## 2021-07-06 RX ADMIN — METHYLPREDNISOLONE SODIUM SUCCINATE 125 MG: 125 INJECTION, POWDER, FOR SOLUTION INTRAMUSCULAR; INTRAVENOUS at 05:07

## 2021-07-06 RX ADMIN — DIPHENHYDRAMINE HYDROCHLORIDE 50 MG: 50 INJECTION INTRAMUSCULAR; INTRAVENOUS at 03:07

## 2021-07-06 NOTE — TELEPHONE ENCOUNTER
Patient emailed about medicine causing severe tongue swelling , dizziness in addition to fatigue the patient has mentioned that he had a stroke and the current medicine that's causing these severities is clopidogrel (PLAVIX) 75MG & Carvedilol ( COREG) 12.5mg Tablets

## 2021-07-06 NOTE — TELEPHONE ENCOUNTER
----- Message from Isi Flores sent at 7/6/2021  9:24 AM CDT -----  Who Called: Corry (Wife)    What is the reqeust in detail: Patient is complaining of his tongue swelling whenever he takes clopidogreL (PLAVIX) 75 mg tablet and carvediloL (COREG) 12.5 MG tablet. Please advise.    Can the clinic reply by [facility]?: No    Best Call Back Number: [phone]

## 2021-07-06 NOTE — TELEPHONE ENCOUNTER
If pt having tongue swelling, he needs to go to ED. It is unlikely both meds are causing this but he needs evaluation to determine cause. Schedule appt.

## 2021-07-08 ENCOUNTER — HOSPITAL ENCOUNTER (INPATIENT)
Facility: HOSPITAL | Age: 56
LOS: 8 days | Discharge: NURSING, REHAB OR LONG-TERM CARE | DRG: 065 | End: 2021-07-16
Attending: HOSPITALIST | Admitting: HOSPITALIST
Payer: MEDICAID

## 2021-07-08 DIAGNOSIS — I82.409 DVT (DEEP VENOUS THROMBOSIS): ICD-10-CM

## 2021-07-08 DIAGNOSIS — I63.9 STROKE: ICD-10-CM

## 2021-07-08 PROBLEM — K59.01 SLOW TRANSIT CONSTIPATION: Status: ACTIVE | Noted: 2021-07-08

## 2021-07-08 LAB
BILIRUB UR QL STRIP: NEGATIVE
CLARITY UR: CLEAR
COLOR UR: YELLOW
ESTIMATED AVG GLUCOSE: 240 MG/DL (ref 68–131)
GLUCOSE UR QL STRIP: ABNORMAL
HBA1C MFR BLD: 10 % (ref 4–5.6)
HGB UR QL STRIP: NEGATIVE
KETONES UR QL STRIP: ABNORMAL
LEUKOCYTE ESTERASE UR QL STRIP: NEGATIVE
NITRITE UR QL STRIP: NEGATIVE
PH UR STRIP: 6 [PH] (ref 5–8)
POCT GLUCOSE: 117 MG/DL (ref 70–110)
POCT GLUCOSE: 208 MG/DL (ref 70–110)
PROT UR QL STRIP: ABNORMAL
SP GR UR STRIP: 1.02 (ref 1–1.03)
URN SPEC COLLECT METH UR: ABNORMAL
UROBILINOGEN UR STRIP-ACNC: NEGATIVE EU/DL

## 2021-07-08 PROCEDURE — 63600175 PHARM REV CODE 636 W HCPCS: Performed by: INTERNAL MEDICINE

## 2021-07-08 PROCEDURE — 63600175 PHARM REV CODE 636 W HCPCS: Performed by: HOSPITALIST

## 2021-07-08 PROCEDURE — 97116 GAIT TRAINING THERAPY: CPT

## 2021-07-08 PROCEDURE — 97165 OT EVAL LOW COMPLEX 30 MIN: CPT

## 2021-07-08 PROCEDURE — 99222 1ST HOSP IP/OBS MODERATE 55: CPT | Mod: ,,, | Performed by: PSYCHIATRY & NEUROLOGY

## 2021-07-08 PROCEDURE — 96374 THER/PROPH/DIAG INJ IV PUSH: CPT | Performed by: HOSPITALIST

## 2021-07-08 PROCEDURE — 99222 PR INITIAL HOSPITAL CARE,LEVL II: ICD-10-PCS | Mod: ,,, | Performed by: PSYCHIATRY & NEUROLOGY

## 2021-07-08 PROCEDURE — 36415 COLL VENOUS BLD VENIPUNCTURE: CPT | Performed by: INTERNAL MEDICINE

## 2021-07-08 PROCEDURE — 92610 EVALUATE SWALLOWING FUNCTION: CPT

## 2021-07-08 PROCEDURE — 81003 URINALYSIS AUTO W/O SCOPE: CPT | Performed by: INTERNAL MEDICINE

## 2021-07-08 PROCEDURE — 83036 HEMOGLOBIN GLYCOSYLATED A1C: CPT | Performed by: INTERNAL MEDICINE

## 2021-07-08 PROCEDURE — 97161 PT EVAL LOW COMPLEX 20 MIN: CPT

## 2021-07-08 PROCEDURE — 21400001 HC TELEMETRY ROOM

## 2021-07-08 PROCEDURE — C9113 INJ PANTOPRAZOLE SODIUM, VIA: HCPCS | Performed by: HOSPITALIST

## 2021-07-08 PROCEDURE — 96375 TX/PRO/DX INJ NEW DRUG ADDON: CPT | Performed by: HOSPITALIST

## 2021-07-08 PROCEDURE — 96361 HYDRATE IV INFUSION ADD-ON: CPT | Performed by: HOSPITALIST

## 2021-07-08 PROCEDURE — 25000003 PHARM REV CODE 250: Performed by: INTERNAL MEDICINE

## 2021-07-08 RX ORDER — PANTOPRAZOLE SODIUM 40 MG/10ML
40 INJECTION, POWDER, LYOPHILIZED, FOR SOLUTION INTRAVENOUS DAILY
Status: DISCONTINUED | OUTPATIENT
Start: 2021-07-08 | End: 2021-07-16 | Stop reason: HOSPADM

## 2021-07-08 RX ORDER — METHYLPREDNISOLONE SOD SUCC 125 MG
80 VIAL (EA) INJECTION EVERY 8 HOURS
Status: DISCONTINUED | OUTPATIENT
Start: 2021-07-08 | End: 2021-07-11

## 2021-07-08 RX ORDER — LACTULOSE 10 G/15ML
20 SOLUTION ORAL 3 TIMES DAILY
Status: DISCONTINUED | OUTPATIENT
Start: 2021-07-08 | End: 2021-07-16 | Stop reason: HOSPADM

## 2021-07-08 RX ORDER — DIPHENHYDRAMINE HYDROCHLORIDE 50 MG/ML
25 INJECTION INTRAMUSCULAR; INTRAVENOUS ONCE
Status: COMPLETED | OUTPATIENT
Start: 2021-07-08 | End: 2021-07-08

## 2021-07-08 RX ORDER — GLUCAGON 1 MG
1 KIT INJECTION
Status: DISCONTINUED | OUTPATIENT
Start: 2021-07-08 | End: 2021-07-16 | Stop reason: HOSPADM

## 2021-07-08 RX ORDER — LOSARTAN POTASSIUM 25 MG/1
50 TABLET ORAL DAILY
Status: DISCONTINUED | OUTPATIENT
Start: 2021-07-08 | End: 2021-07-08

## 2021-07-08 RX ORDER — CARVEDILOL 12.5 MG/1
12.5 TABLET ORAL 2 TIMES DAILY
Status: DISCONTINUED | OUTPATIENT
Start: 2021-07-08 | End: 2021-07-08

## 2021-07-08 RX ORDER — LORAZEPAM 2 MG/ML
1 INJECTION INTRAMUSCULAR ONCE
Status: COMPLETED | OUTPATIENT
Start: 2021-07-08 | End: 2021-07-08

## 2021-07-08 RX ORDER — NIFEDIPINE 30 MG/1
90 TABLET, EXTENDED RELEASE ORAL DAILY
Status: DISCONTINUED | OUTPATIENT
Start: 2021-07-08 | End: 2021-07-08

## 2021-07-08 RX ORDER — ATORVASTATIN CALCIUM 40 MG/1
40 TABLET, FILM COATED ORAL DAILY
Status: DISCONTINUED | OUTPATIENT
Start: 2021-07-08 | End: 2021-07-16 | Stop reason: HOSPADM

## 2021-07-08 RX ORDER — CLOPIDOGREL BISULFATE 75 MG/1
75 TABLET ORAL DAILY
Status: DISCONTINUED | OUTPATIENT
Start: 2021-07-08 | End: 2021-07-16 | Stop reason: HOSPADM

## 2021-07-08 RX ORDER — ASPIRIN 81 MG/1
81 TABLET ORAL DAILY
Status: DISCONTINUED | OUTPATIENT
Start: 2021-07-08 | End: 2021-07-15

## 2021-07-08 RX ORDER — DEXTROSE MONOHYDRATE AND SODIUM CHLORIDE 5; .9 G/100ML; G/100ML
INJECTION, SOLUTION INTRAVENOUS CONTINUOUS
Status: DISCONTINUED | OUTPATIENT
Start: 2021-07-08 | End: 2021-07-15

## 2021-07-08 RX ORDER — INSULIN ASPART 100 [IU]/ML
0-5 INJECTION, SOLUTION INTRAVENOUS; SUBCUTANEOUS EVERY 6 HOURS PRN
Status: DISCONTINUED | OUTPATIENT
Start: 2021-07-08 | End: 2021-07-16 | Stop reason: HOSPADM

## 2021-07-08 RX ORDER — DOCUSATE SODIUM 100 MG/1
100 CAPSULE, LIQUID FILLED ORAL DAILY
Status: DISCONTINUED | OUTPATIENT
Start: 2021-07-08 | End: 2021-07-16 | Stop reason: HOSPADM

## 2021-07-08 RX ADMIN — LACTULOSE 20 G: 10 SOLUTION ORAL at 09:07

## 2021-07-08 RX ADMIN — ASPIRIN 81 MG: 81 TABLET, COATED ORAL at 08:07

## 2021-07-08 RX ADMIN — DOCUSATE SODIUM 100 MG: 100 CAPSULE ORAL at 08:07

## 2021-07-08 RX ADMIN — PANTOPRAZOLE SODIUM 40 MG: 40 INJECTION, POWDER, FOR SOLUTION INTRAVENOUS at 12:07

## 2021-07-08 RX ADMIN — DEXTROSE MONOHYDRATE AND SODIUM CHLORIDE: 5; .9 INJECTION, SOLUTION INTRAVENOUS at 12:07

## 2021-07-08 RX ADMIN — METHYLPREDNISOLONE SODIUM SUCCINATE 80 MG: 125 INJECTION, POWDER, FOR SOLUTION INTRAMUSCULAR; INTRAVENOUS at 09:07

## 2021-07-08 RX ADMIN — INSULIN ASPART 2 UNITS: 100 INJECTION, SOLUTION INTRAVENOUS; SUBCUTANEOUS at 05:07

## 2021-07-08 RX ADMIN — LACTULOSE 20 G: 10 SOLUTION ORAL at 08:07

## 2021-07-08 RX ADMIN — LORAZEPAM 1 MG: 2 INJECTION INTRAMUSCULAR; INTRAVENOUS at 10:07

## 2021-07-08 RX ADMIN — CLOPIDOGREL 75 MG: 75 TABLET, FILM COATED ORAL at 08:07

## 2021-07-08 RX ADMIN — ATORVASTATIN CALCIUM 40 MG: 40 TABLET, FILM COATED ORAL at 08:07

## 2021-07-08 RX ADMIN — DIPHENHYDRAMINE HYDROCHLORIDE 25 MG: 50 INJECTION INTRAMUSCULAR; INTRAVENOUS at 04:07

## 2021-07-08 RX ADMIN — LACTULOSE 20 G: 10 SOLUTION ORAL at 03:07

## 2021-07-08 RX ADMIN — METHYLPREDNISOLONE SODIUM SUCCINATE 80 MG: 125 INJECTION, POWDER, FOR SOLUTION INTRAMUSCULAR; INTRAVENOUS at 12:07

## 2021-07-09 LAB
ALBUMIN SERPL BCP-MCNC: 3.5 G/DL (ref 3.5–5.2)
ALP SERPL-CCNC: 84 U/L (ref 55–135)
ALT SERPL W/O P-5'-P-CCNC: 18 U/L (ref 10–44)
ANION GAP SERPL CALC-SCNC: 8 MMOL/L (ref 8–16)
APTT BLDCRRT: 26 SEC (ref 21–32)
AST SERPL-CCNC: 12 U/L (ref 10–40)
BASOPHILS # BLD AUTO: 0 K/UL (ref 0–0.2)
BASOPHILS NFR BLD: 0 % (ref 0–1.9)
BILIRUB SERPL-MCNC: 0.6 MG/DL (ref 0.1–1)
BUN SERPL-MCNC: 25 MG/DL (ref 6–20)
CALCIUM SERPL-MCNC: 9.2 MG/DL (ref 8.7–10.5)
CHLORIDE SERPL-SCNC: 110 MMOL/L (ref 95–110)
CK MB SERPL-MCNC: 0.8 NG/ML (ref 0.1–6.5)
CK MB SERPL-RTO: 1.7 % (ref 0–5)
CK SERPL-CCNC: 48 U/L (ref 20–200)
CO2 SERPL-SCNC: 21 MMOL/L (ref 23–29)
CREAT SERPL-MCNC: 1.5 MG/DL (ref 0.5–1.4)
DIFFERENTIAL METHOD: ABNORMAL
EOSINOPHIL # BLD AUTO: 0 K/UL (ref 0–0.5)
EOSINOPHIL NFR BLD: 0 % (ref 0–8)
ERYTHROCYTE [DISTWIDTH] IN BLOOD BY AUTOMATED COUNT: 12.7 % (ref 11.5–14.5)
EST. GFR  (AFRICAN AMERICAN): >60 ML/MIN/1.73 M^2
EST. GFR  (NON AFRICAN AMERICAN): 52 ML/MIN/1.73 M^2
GLUCOSE SERPL-MCNC: 240 MG/DL (ref 70–110)
HCT VFR BLD AUTO: 39.3 % (ref 40–54)
HGB BLD-MCNC: 13 G/DL (ref 14–18)
IMM GRANULOCYTES # BLD AUTO: 0.02 K/UL (ref 0–0.04)
IMM GRANULOCYTES NFR BLD AUTO: 0.3 % (ref 0–0.5)
INR PPP: 1.2 (ref 0.8–1.2)
LYMPHOCYTES # BLD AUTO: 0.9 K/UL (ref 1–4.8)
LYMPHOCYTES NFR BLD: 12.9 % (ref 18–48)
MAGNESIUM SERPL-MCNC: 2.1 MG/DL (ref 1.6–2.6)
MCH RBC QN AUTO: 26.8 PG (ref 27–31)
MCHC RBC AUTO-ENTMCNC: 33.1 G/DL (ref 32–36)
MCV RBC AUTO: 81 FL (ref 82–98)
MONOCYTES # BLD AUTO: 0.1 K/UL (ref 0.3–1)
MONOCYTES NFR BLD: 0.8 % (ref 4–15)
NEUTROPHILS # BLD AUTO: 6.2 K/UL (ref 1.8–7.7)
NEUTROPHILS NFR BLD: 86 % (ref 38–73)
NRBC BLD-RTO: 0 /100 WBC
PHOSPHATE SERPL-MCNC: 4.1 MG/DL (ref 2.7–4.5)
PLATELET # BLD AUTO: 227 K/UL (ref 150–450)
PMV BLD AUTO: 10.2 FL (ref 9.2–12.9)
POCT GLUCOSE: 211 MG/DL (ref 70–110)
POCT GLUCOSE: 217 MG/DL (ref 70–110)
POCT GLUCOSE: 250 MG/DL (ref 70–110)
POTASSIUM SERPL-SCNC: 4 MMOL/L (ref 3.5–5.1)
PROT SERPL-MCNC: 7.2 G/DL (ref 6–8.4)
PROTHROMBIN TIME: 12.6 SEC (ref 9–12.5)
RBC # BLD AUTO: 4.85 M/UL (ref 4.6–6.2)
SODIUM SERPL-SCNC: 139 MMOL/L (ref 136–145)
TROPONIN I SERPL DL<=0.01 NG/ML-MCNC: 0.05 NG/ML (ref 0–0.03)
WBC # BLD AUTO: 7.15 K/UL (ref 3.9–12.7)

## 2021-07-09 PROCEDURE — 97110 THERAPEUTIC EXERCISES: CPT

## 2021-07-09 PROCEDURE — 85730 THROMBOPLASTIN TIME PARTIAL: CPT | Performed by: INTERNAL MEDICINE

## 2021-07-09 PROCEDURE — 82550 ASSAY OF CK (CPK): CPT | Performed by: INTERNAL MEDICINE

## 2021-07-09 PROCEDURE — 97535 SELF CARE MNGMENT TRAINING: CPT

## 2021-07-09 PROCEDURE — 83735 ASSAY OF MAGNESIUM: CPT | Performed by: INTERNAL MEDICINE

## 2021-07-09 PROCEDURE — 97110 THERAPEUTIC EXERCISES: CPT | Mod: CQ

## 2021-07-09 PROCEDURE — 80053 COMPREHEN METABOLIC PANEL: CPT | Performed by: HOSPITALIST

## 2021-07-09 PROCEDURE — 84484 ASSAY OF TROPONIN QUANT: CPT | Performed by: INTERNAL MEDICINE

## 2021-07-09 PROCEDURE — 21400001 HC TELEMETRY ROOM

## 2021-07-09 PROCEDURE — 92523 SPEECH SOUND LANG COMPREHEN: CPT

## 2021-07-09 PROCEDURE — 25000003 PHARM REV CODE 250: Performed by: HOSPITALIST

## 2021-07-09 PROCEDURE — 99232 SBSQ HOSP IP/OBS MODERATE 35: CPT | Mod: ,,, | Performed by: PSYCHIATRY & NEUROLOGY

## 2021-07-09 PROCEDURE — 99232 PR SUBSEQUENT HOSPITAL CARE,LEVL II: ICD-10-PCS | Mod: ,,, | Performed by: PSYCHIATRY & NEUROLOGY

## 2021-07-09 PROCEDURE — 25000003 PHARM REV CODE 250: Performed by: INTERNAL MEDICINE

## 2021-07-09 PROCEDURE — 85610 PROTHROMBIN TIME: CPT | Performed by: INTERNAL MEDICINE

## 2021-07-09 PROCEDURE — 63600175 PHARM REV CODE 636 W HCPCS: Performed by: HOSPITALIST

## 2021-07-09 PROCEDURE — 36415 COLL VENOUS BLD VENIPUNCTURE: CPT | Performed by: INTERNAL MEDICINE

## 2021-07-09 PROCEDURE — 84100 ASSAY OF PHOSPHORUS: CPT | Performed by: INTERNAL MEDICINE

## 2021-07-09 PROCEDURE — 92526 ORAL FUNCTION THERAPY: CPT

## 2021-07-09 PROCEDURE — 85025 COMPLETE CBC W/AUTO DIFF WBC: CPT | Performed by: INTERNAL MEDICINE

## 2021-07-09 PROCEDURE — C9113 INJ PANTOPRAZOLE SODIUM, VIA: HCPCS | Performed by: HOSPITALIST

## 2021-07-09 RX ORDER — HYDRALAZINE HYDROCHLORIDE 20 MG/ML
10 INJECTION INTRAMUSCULAR; INTRAVENOUS EVERY 4 HOURS PRN
Status: DISCONTINUED | OUTPATIENT
Start: 2021-07-09 | End: 2021-07-12

## 2021-07-09 RX ADMIN — SODIUM PHOSPHATE, DIBASIC AND SODIUM PHOSPHATE, MONOBASIC 1 ENEMA: 7; 19 ENEMA RECTAL at 08:07

## 2021-07-09 RX ADMIN — ASPIRIN 81 MG: 81 TABLET, COATED ORAL at 09:07

## 2021-07-09 RX ADMIN — DEXTROSE MONOHYDRATE AND SODIUM CHLORIDE: 5; .9 INJECTION, SOLUTION INTRAVENOUS at 08:07

## 2021-07-09 RX ADMIN — DOCUSATE SODIUM 100 MG: 100 CAPSULE ORAL at 09:07

## 2021-07-09 RX ADMIN — ATORVASTATIN CALCIUM 40 MG: 40 TABLET, FILM COATED ORAL at 09:07

## 2021-07-09 RX ADMIN — DEXTROSE MONOHYDRATE AND SODIUM CHLORIDE: 5; .9 INJECTION, SOLUTION INTRAVENOUS at 05:07

## 2021-07-09 RX ADMIN — CLOPIDOGREL 75 MG: 75 TABLET, FILM COATED ORAL at 09:07

## 2021-07-09 RX ADMIN — METHYLPREDNISOLONE SODIUM SUCCINATE 80 MG: 125 INJECTION, POWDER, FOR SOLUTION INTRAMUSCULAR; INTRAVENOUS at 05:07

## 2021-07-09 RX ADMIN — PANTOPRAZOLE SODIUM 40 MG: 40 INJECTION, POWDER, FOR SOLUTION INTRAVENOUS at 09:07

## 2021-07-09 RX ADMIN — INSULIN ASPART 2 UNITS: 100 INJECTION, SOLUTION INTRAVENOUS; SUBCUTANEOUS at 08:07

## 2021-07-09 RX ADMIN — METHYLPREDNISOLONE SODIUM SUCCINATE 80 MG: 125 INJECTION, POWDER, FOR SOLUTION INTRAMUSCULAR; INTRAVENOUS at 02:07

## 2021-07-09 RX ADMIN — LACTULOSE 20 G: 10 SOLUTION ORAL at 09:07

## 2021-07-09 RX ADMIN — METHYLPREDNISOLONE SODIUM SUCCINATE 80 MG: 125 INJECTION, POWDER, FOR SOLUTION INTRAMUSCULAR; INTRAVENOUS at 09:07

## 2021-07-10 LAB
ALBUMIN SERPL BCP-MCNC: 3.3 G/DL (ref 3.5–5.2)
ALP SERPL-CCNC: 78 U/L (ref 55–135)
ALT SERPL W/O P-5'-P-CCNC: 17 U/L (ref 10–44)
ANION GAP SERPL CALC-SCNC: 7 MMOL/L (ref 8–16)
AST SERPL-CCNC: 11 U/L (ref 10–40)
BASOPHILS # BLD AUTO: 0.01 K/UL (ref 0–0.2)
BASOPHILS NFR BLD: 0.1 % (ref 0–1.9)
BILIRUB SERPL-MCNC: 0.6 MG/DL (ref 0.1–1)
BUN SERPL-MCNC: 27 MG/DL (ref 6–20)
CALCIUM SERPL-MCNC: 9.2 MG/DL (ref 8.7–10.5)
CHLORIDE SERPL-SCNC: 113 MMOL/L (ref 95–110)
CO2 SERPL-SCNC: 21 MMOL/L (ref 23–29)
CREAT SERPL-MCNC: 1.5 MG/DL (ref 0.5–1.4)
DIFFERENTIAL METHOD: ABNORMAL
EOSINOPHIL # BLD AUTO: 0 K/UL (ref 0–0.5)
EOSINOPHIL NFR BLD: 0 % (ref 0–8)
ERYTHROCYTE [DISTWIDTH] IN BLOOD BY AUTOMATED COUNT: 12.8 % (ref 11.5–14.5)
EST. GFR  (AFRICAN AMERICAN): >60 ML/MIN/1.73 M^2
EST. GFR  (NON AFRICAN AMERICAN): 52 ML/MIN/1.73 M^2
GLUCOSE SERPL-MCNC: 211 MG/DL (ref 70–110)
HCT VFR BLD AUTO: 35.8 % (ref 40–54)
HGB BLD-MCNC: 12.1 G/DL (ref 14–18)
IMM GRANULOCYTES # BLD AUTO: 0.03 K/UL (ref 0–0.04)
IMM GRANULOCYTES NFR BLD AUTO: 0.2 % (ref 0–0.5)
LYMPHOCYTES # BLD AUTO: 0.9 K/UL (ref 1–4.8)
LYMPHOCYTES NFR BLD: 7 % (ref 18–48)
MCH RBC QN AUTO: 27.8 PG (ref 27–31)
MCHC RBC AUTO-ENTMCNC: 33.8 G/DL (ref 32–36)
MCV RBC AUTO: 82 FL (ref 82–98)
MONOCYTES # BLD AUTO: 0.3 K/UL (ref 0.3–1)
MONOCYTES NFR BLD: 2.7 % (ref 4–15)
NEUTROPHILS # BLD AUTO: 11.5 K/UL (ref 1.8–7.7)
NEUTROPHILS NFR BLD: 90 % (ref 38–73)
NRBC BLD-RTO: 0 /100 WBC
PLATELET # BLD AUTO: 227 K/UL (ref 150–450)
PMV BLD AUTO: 10.5 FL (ref 9.2–12.9)
POCT GLUCOSE: 203 MG/DL (ref 70–110)
POCT GLUCOSE: 226 MG/DL (ref 70–110)
POTASSIUM SERPL-SCNC: 4.1 MMOL/L (ref 3.5–5.1)
PROT SERPL-MCNC: 6.8 G/DL (ref 6–8.4)
RBC # BLD AUTO: 4.36 M/UL (ref 4.6–6.2)
SODIUM SERPL-SCNC: 141 MMOL/L (ref 136–145)
WBC # BLD AUTO: 12.78 K/UL (ref 3.9–12.7)

## 2021-07-10 PROCEDURE — 92526 ORAL FUNCTION THERAPY: CPT

## 2021-07-10 PROCEDURE — 21400001 HC TELEMETRY ROOM

## 2021-07-10 PROCEDURE — 97110 THERAPEUTIC EXERCISES: CPT | Mod: CQ

## 2021-07-10 PROCEDURE — 97530 THERAPEUTIC ACTIVITIES: CPT | Mod: CQ

## 2021-07-10 PROCEDURE — C9113 INJ PANTOPRAZOLE SODIUM, VIA: HCPCS | Performed by: HOSPITALIST

## 2021-07-10 PROCEDURE — 80053 COMPREHEN METABOLIC PANEL: CPT | Performed by: HOSPITALIST

## 2021-07-10 PROCEDURE — 25000003 PHARM REV CODE 250: Performed by: INTERNAL MEDICINE

## 2021-07-10 PROCEDURE — 99232 SBSQ HOSP IP/OBS MODERATE 35: CPT | Mod: ,,, | Performed by: PSYCHIATRY & NEUROLOGY

## 2021-07-10 PROCEDURE — 99232 PR SUBSEQUENT HOSPITAL CARE,LEVL II: ICD-10-PCS | Mod: ,,, | Performed by: PSYCHIATRY & NEUROLOGY

## 2021-07-10 PROCEDURE — 92507 TX SP LANG VOICE COMM INDIV: CPT

## 2021-07-10 PROCEDURE — 36415 COLL VENOUS BLD VENIPUNCTURE: CPT | Performed by: HOSPITALIST

## 2021-07-10 PROCEDURE — 63600175 PHARM REV CODE 636 W HCPCS: Performed by: HOSPITALIST

## 2021-07-10 PROCEDURE — 97112 NEUROMUSCULAR REEDUCATION: CPT | Mod: CQ

## 2021-07-10 PROCEDURE — 97535 SELF CARE MNGMENT TRAINING: CPT

## 2021-07-10 PROCEDURE — 85025 COMPLETE CBC W/AUTO DIFF WBC: CPT | Performed by: INTERNAL MEDICINE

## 2021-07-10 PROCEDURE — 97530 THERAPEUTIC ACTIVITIES: CPT

## 2021-07-10 RX ORDER — AMLODIPINE BESYLATE 5 MG/1
10 TABLET ORAL DAILY
Status: DISCONTINUED | OUTPATIENT
Start: 2021-07-10 | End: 2021-07-16 | Stop reason: HOSPADM

## 2021-07-10 RX ADMIN — METHYLPREDNISOLONE SODIUM SUCCINATE 80 MG: 125 INJECTION, POWDER, FOR SOLUTION INTRAMUSCULAR; INTRAVENOUS at 10:07

## 2021-07-10 RX ADMIN — AMLODIPINE BESYLATE 10 MG: 5 TABLET ORAL at 10:07

## 2021-07-10 RX ADMIN — METHYLPREDNISOLONE SODIUM SUCCINATE 80 MG: 125 INJECTION, POWDER, FOR SOLUTION INTRAMUSCULAR; INTRAVENOUS at 02:07

## 2021-07-10 RX ADMIN — CLOPIDOGREL 75 MG: 75 TABLET, FILM COATED ORAL at 08:07

## 2021-07-10 RX ADMIN — ASPIRIN 81 MG: 81 TABLET, COATED ORAL at 08:07

## 2021-07-10 RX ADMIN — ATORVASTATIN CALCIUM 40 MG: 40 TABLET, FILM COATED ORAL at 08:07

## 2021-07-10 RX ADMIN — PANTOPRAZOLE SODIUM 40 MG: 40 INJECTION, POWDER, FOR SOLUTION INTRAVENOUS at 08:07

## 2021-07-10 RX ADMIN — METHYLPREDNISOLONE SODIUM SUCCINATE 80 MG: 125 INJECTION, POWDER, FOR SOLUTION INTRAMUSCULAR; INTRAVENOUS at 05:07

## 2021-07-10 RX ADMIN — DEXTROSE MONOHYDRATE AND SODIUM CHLORIDE: 5; .9 INJECTION, SOLUTION INTRAVENOUS at 10:07

## 2021-07-10 RX ADMIN — INSULIN ASPART 2 UNITS: 100 INJECTION, SOLUTION INTRAVENOUS; SUBCUTANEOUS at 05:07

## 2021-07-10 RX ADMIN — DEXTROSE MONOHYDRATE AND SODIUM CHLORIDE: 5; .9 INJECTION, SOLUTION INTRAVENOUS at 08:07

## 2021-07-11 LAB
ANION GAP SERPL CALC-SCNC: 6 MMOL/L (ref 8–16)
BASOPHILS # BLD AUTO: 0.01 K/UL (ref 0–0.2)
BASOPHILS NFR BLD: 0.1 % (ref 0–1.9)
BUN SERPL-MCNC: 28 MG/DL (ref 6–20)
CALCIUM SERPL-MCNC: 8.7 MG/DL (ref 8.7–10.5)
CHLORIDE SERPL-SCNC: 116 MMOL/L (ref 95–110)
CO2 SERPL-SCNC: 22 MMOL/L (ref 23–29)
CREAT SERPL-MCNC: 1.4 MG/DL (ref 0.5–1.4)
DIFFERENTIAL METHOD: ABNORMAL
EOSINOPHIL # BLD AUTO: 0 K/UL (ref 0–0.5)
EOSINOPHIL NFR BLD: 0 % (ref 0–8)
ERYTHROCYTE [DISTWIDTH] IN BLOOD BY AUTOMATED COUNT: 12.8 % (ref 11.5–14.5)
EST. GFR  (AFRICAN AMERICAN): >60 ML/MIN/1.73 M^2
EST. GFR  (NON AFRICAN AMERICAN): 57 ML/MIN/1.73 M^2
GLUCOSE SERPL-MCNC: 210 MG/DL (ref 70–110)
HCT VFR BLD AUTO: 34.9 % (ref 40–54)
HGB BLD-MCNC: 11.9 G/DL (ref 14–18)
IMM GRANULOCYTES # BLD AUTO: 0.03 K/UL (ref 0–0.04)
IMM GRANULOCYTES NFR BLD AUTO: 0.4 % (ref 0–0.5)
LYMPHOCYTES # BLD AUTO: 0.7 K/UL (ref 1–4.8)
LYMPHOCYTES NFR BLD: 8.4 % (ref 18–48)
MCH RBC QN AUTO: 27.6 PG (ref 27–31)
MCHC RBC AUTO-ENTMCNC: 34.1 G/DL (ref 32–36)
MCV RBC AUTO: 81 FL (ref 82–98)
MONOCYTES # BLD AUTO: 0.2 K/UL (ref 0.3–1)
MONOCYTES NFR BLD: 2.2 % (ref 4–15)
NEUTROPHILS # BLD AUTO: 7.5 K/UL (ref 1.8–7.7)
NEUTROPHILS NFR BLD: 88.9 % (ref 38–73)
NRBC BLD-RTO: 0 /100 WBC
PLATELET # BLD AUTO: 197 K/UL (ref 150–450)
PMV BLD AUTO: 9.9 FL (ref 9.2–12.9)
POCT GLUCOSE: 181 MG/DL (ref 70–110)
POCT GLUCOSE: 187 MG/DL (ref 70–110)
POCT GLUCOSE: 203 MG/DL (ref 70–110)
POCT GLUCOSE: 220 MG/DL (ref 70–110)
POTASSIUM SERPL-SCNC: 4.1 MMOL/L (ref 3.5–5.1)
RBC # BLD AUTO: 4.31 M/UL (ref 4.6–6.2)
SODIUM SERPL-SCNC: 144 MMOL/L (ref 136–145)
WBC # BLD AUTO: 8.46 K/UL (ref 3.9–12.7)

## 2021-07-11 PROCEDURE — 97530 THERAPEUTIC ACTIVITIES: CPT | Mod: CQ

## 2021-07-11 PROCEDURE — 99232 PR SUBSEQUENT HOSPITAL CARE,LEVL II: ICD-10-PCS | Mod: ,,, | Performed by: PSYCHIATRY & NEUROLOGY

## 2021-07-11 PROCEDURE — 36415 COLL VENOUS BLD VENIPUNCTURE: CPT | Performed by: INTERNAL MEDICINE

## 2021-07-11 PROCEDURE — 25000003 PHARM REV CODE 250: Performed by: INTERNAL MEDICINE

## 2021-07-11 PROCEDURE — 21400001 HC TELEMETRY ROOM

## 2021-07-11 PROCEDURE — 80048 BASIC METABOLIC PNL TOTAL CA: CPT | Performed by: INTERNAL MEDICINE

## 2021-07-11 PROCEDURE — 92526 ORAL FUNCTION THERAPY: CPT

## 2021-07-11 PROCEDURE — 85025 COMPLETE CBC W/AUTO DIFF WBC: CPT | Performed by: INTERNAL MEDICINE

## 2021-07-11 PROCEDURE — 63600175 PHARM REV CODE 636 W HCPCS: Performed by: HOSPITALIST

## 2021-07-11 PROCEDURE — C9113 INJ PANTOPRAZOLE SODIUM, VIA: HCPCS | Performed by: HOSPITALIST

## 2021-07-11 PROCEDURE — 99232 SBSQ HOSP IP/OBS MODERATE 35: CPT | Mod: ,,, | Performed by: PSYCHIATRY & NEUROLOGY

## 2021-07-11 PROCEDURE — 63600175 PHARM REV CODE 636 W HCPCS: Performed by: INTERNAL MEDICINE

## 2021-07-11 RX ORDER — PREDNISONE 20 MG/1
60 TABLET ORAL DAILY
Status: DISCONTINUED | OUTPATIENT
Start: 2021-07-11 | End: 2021-07-13

## 2021-07-11 RX ORDER — CLONIDINE 0.3 MG/24H
1 PATCH, EXTENDED RELEASE TRANSDERMAL
Status: DISCONTINUED | OUTPATIENT
Start: 2021-07-11 | End: 2021-07-16 | Stop reason: HOSPADM

## 2021-07-11 RX ADMIN — METHYLPREDNISOLONE SODIUM SUCCINATE 80 MG: 125 INJECTION, POWDER, FOR SOLUTION INTRAMUSCULAR; INTRAVENOUS at 06:07

## 2021-07-11 RX ADMIN — CLONIDINE 1 PATCH: 0.3 PATCH TRANSDERMAL at 01:07

## 2021-07-11 RX ADMIN — PREDNISONE 60 MG: 20 TABLET ORAL at 04:07

## 2021-07-11 RX ADMIN — PANTOPRAZOLE SODIUM 40 MG: 40 INJECTION, POWDER, FOR SOLUTION INTRAVENOUS at 09:07

## 2021-07-11 RX ADMIN — HYDRALAZINE HYDROCHLORIDE 10 MG: 20 INJECTION INTRAMUSCULAR; INTRAVENOUS at 12:07

## 2021-07-11 RX ADMIN — INSULIN ASPART 1 UNITS: 100 INJECTION, SOLUTION INTRAVENOUS; SUBCUTANEOUS at 12:07

## 2021-07-11 RX ADMIN — DEXTROSE MONOHYDRATE AND SODIUM CHLORIDE: 5; .9 INJECTION, SOLUTION INTRAVENOUS at 11:07

## 2021-07-12 LAB
ANION GAP SERPL CALC-SCNC: 6 MMOL/L (ref 8–16)
BASOPHILS # BLD AUTO: 0.01 K/UL (ref 0–0.2)
BASOPHILS NFR BLD: 0.1 % (ref 0–1.9)
BUN SERPL-MCNC: 23 MG/DL (ref 6–20)
CALCIUM SERPL-MCNC: 8.5 MG/DL (ref 8.7–10.5)
CHLORIDE SERPL-SCNC: 113 MMOL/L (ref 95–110)
CO2 SERPL-SCNC: 22 MMOL/L (ref 23–29)
CREAT SERPL-MCNC: 1.2 MG/DL (ref 0.5–1.4)
DIFFERENTIAL METHOD: ABNORMAL
EOSINOPHIL # BLD AUTO: 0 K/UL (ref 0–0.5)
EOSINOPHIL NFR BLD: 0 % (ref 0–8)
ERYTHROCYTE [DISTWIDTH] IN BLOOD BY AUTOMATED COUNT: 12.9 % (ref 11.5–14.5)
EST. GFR  (AFRICAN AMERICAN): >60 ML/MIN/1.73 M^2
EST. GFR  (NON AFRICAN AMERICAN): >60 ML/MIN/1.73 M^2
GLUCOSE SERPL-MCNC: 167 MG/DL (ref 70–110)
HCT VFR BLD AUTO: 35.5 % (ref 40–54)
HGB BLD-MCNC: 11.7 G/DL (ref 14–18)
IMM GRANULOCYTES # BLD AUTO: 0.03 K/UL (ref 0–0.04)
IMM GRANULOCYTES NFR BLD AUTO: 0.3 % (ref 0–0.5)
LYMPHOCYTES # BLD AUTO: 1.2 K/UL (ref 1–4.8)
LYMPHOCYTES NFR BLD: 13.2 % (ref 18–48)
MCH RBC QN AUTO: 27.2 PG (ref 27–31)
MCHC RBC AUTO-ENTMCNC: 33 G/DL (ref 32–36)
MCV RBC AUTO: 83 FL (ref 82–98)
MONOCYTES # BLD AUTO: 0.5 K/UL (ref 0.3–1)
MONOCYTES NFR BLD: 5.5 % (ref 4–15)
NEUTROPHILS # BLD AUTO: 7.1 K/UL (ref 1.8–7.7)
NEUTROPHILS NFR BLD: 80.9 % (ref 38–73)
NRBC BLD-RTO: 0 /100 WBC
PLATELET # BLD AUTO: 186 K/UL (ref 150–450)
PMV BLD AUTO: 10.5 FL (ref 9.2–12.9)
POCT GLUCOSE: 111 MG/DL (ref 70–110)
POCT GLUCOSE: 164 MG/DL (ref 70–110)
POTASSIUM SERPL-SCNC: 3.6 MMOL/L (ref 3.5–5.1)
RBC # BLD AUTO: 4.3 M/UL (ref 4.6–6.2)
SODIUM SERPL-SCNC: 141 MMOL/L (ref 136–145)
WBC # BLD AUTO: 8.76 K/UL (ref 3.9–12.7)

## 2021-07-12 PROCEDURE — 97535 SELF CARE MNGMENT TRAINING: CPT

## 2021-07-12 PROCEDURE — 97116 GAIT TRAINING THERAPY: CPT | Mod: CQ

## 2021-07-12 PROCEDURE — 97110 THERAPEUTIC EXERCISES: CPT | Mod: CQ

## 2021-07-12 PROCEDURE — 63600175 PHARM REV CODE 636 W HCPCS: Performed by: HOSPITALIST

## 2021-07-12 PROCEDURE — 92507 TX SP LANG VOICE COMM INDIV: CPT

## 2021-07-12 PROCEDURE — 92611 MOTION FLUOROSCOPY/SWALLOW: CPT

## 2021-07-12 PROCEDURE — 85025 COMPLETE CBC W/AUTO DIFF WBC: CPT | Performed by: INTERNAL MEDICINE

## 2021-07-12 PROCEDURE — 99232 SBSQ HOSP IP/OBS MODERATE 35: CPT | Mod: ,,, | Performed by: PSYCHIATRY & NEUROLOGY

## 2021-07-12 PROCEDURE — 21400001 HC TELEMETRY ROOM

## 2021-07-12 PROCEDURE — 36415 COLL VENOUS BLD VENIPUNCTURE: CPT | Performed by: INTERNAL MEDICINE

## 2021-07-12 PROCEDURE — 97530 THERAPEUTIC ACTIVITIES: CPT | Mod: CO

## 2021-07-12 PROCEDURE — C9113 INJ PANTOPRAZOLE SODIUM, VIA: HCPCS | Performed by: HOSPITALIST

## 2021-07-12 PROCEDURE — 63600175 PHARM REV CODE 636 W HCPCS: Performed by: INTERNAL MEDICINE

## 2021-07-12 PROCEDURE — 99232 PR SUBSEQUENT HOSPITAL CARE,LEVL II: ICD-10-PCS | Mod: ,,, | Performed by: PSYCHIATRY & NEUROLOGY

## 2021-07-12 PROCEDURE — 25000003 PHARM REV CODE 250: Performed by: INTERNAL MEDICINE

## 2021-07-12 PROCEDURE — 80048 BASIC METABOLIC PNL TOTAL CA: CPT | Performed by: INTERNAL MEDICINE

## 2021-07-12 RX ORDER — HYDRALAZINE HYDROCHLORIDE 20 MG/ML
10 INJECTION INTRAMUSCULAR; INTRAVENOUS EVERY 6 HOURS PRN
Status: DISCONTINUED | OUTPATIENT
Start: 2021-07-12 | End: 2021-07-16 | Stop reason: HOSPADM

## 2021-07-12 RX ADMIN — DEXTROSE MONOHYDRATE AND SODIUM CHLORIDE: 5; .9 INJECTION, SOLUTION INTRAVENOUS at 12:07

## 2021-07-12 RX ADMIN — ATORVASTATIN CALCIUM 40 MG: 40 TABLET, FILM COATED ORAL at 11:07

## 2021-07-12 RX ADMIN — HYDRALAZINE HYDROCHLORIDE 10 MG: 20 INJECTION INTRAMUSCULAR; INTRAVENOUS at 05:07

## 2021-07-12 RX ADMIN — AMLODIPINE BESYLATE 10 MG: 5 TABLET ORAL at 11:07

## 2021-07-12 RX ADMIN — DEXTROSE MONOHYDRATE AND SODIUM CHLORIDE: 5; .9 INJECTION, SOLUTION INTRAVENOUS at 05:07

## 2021-07-12 RX ADMIN — ASPIRIN 81 MG: 81 TABLET, COATED ORAL at 11:07

## 2021-07-12 RX ADMIN — PANTOPRAZOLE SODIUM 40 MG: 40 INJECTION, POWDER, FOR SOLUTION INTRAVENOUS at 11:07

## 2021-07-12 RX ADMIN — PREDNISONE 60 MG: 20 TABLET ORAL at 11:07

## 2021-07-12 RX ADMIN — DOCUSATE SODIUM 100 MG: 100 CAPSULE ORAL at 11:07

## 2021-07-12 RX ADMIN — CLOPIDOGREL 75 MG: 75 TABLET, FILM COATED ORAL at 11:07

## 2021-07-13 LAB
ANION GAP SERPL CALC-SCNC: 7 MMOL/L (ref 8–16)
BUN SERPL-MCNC: 20 MG/DL (ref 6–20)
CALCIUM SERPL-MCNC: 8.5 MG/DL (ref 8.7–10.5)
CHLORIDE SERPL-SCNC: 109 MMOL/L (ref 95–110)
CO2 SERPL-SCNC: 21 MMOL/L (ref 23–29)
CREAT SERPL-MCNC: 1.1 MG/DL (ref 0.5–1.4)
EST. GFR  (AFRICAN AMERICAN): >60 ML/MIN/1.73 M^2
EST. GFR  (NON AFRICAN AMERICAN): >60 ML/MIN/1.73 M^2
GLUCOSE SERPL-MCNC: 161 MG/DL (ref 70–110)
POCT GLUCOSE: 125 MG/DL (ref 70–110)
POCT GLUCOSE: 138 MG/DL (ref 70–110)
POCT GLUCOSE: 194 MG/DL (ref 70–110)
POTASSIUM SERPL-SCNC: 3.5 MMOL/L (ref 3.5–5.1)
SODIUM SERPL-SCNC: 137 MMOL/L (ref 136–145)

## 2021-07-13 PROCEDURE — 25000003 PHARM REV CODE 250: Performed by: INTERNAL MEDICINE

## 2021-07-13 PROCEDURE — 63600175 PHARM REV CODE 636 W HCPCS: Performed by: HOSPITALIST

## 2021-07-13 PROCEDURE — 21400001 HC TELEMETRY ROOM

## 2021-07-13 PROCEDURE — C9113 INJ PANTOPRAZOLE SODIUM, VIA: HCPCS | Performed by: HOSPITALIST

## 2021-07-13 PROCEDURE — 97116 GAIT TRAINING THERAPY: CPT | Mod: CQ

## 2021-07-13 PROCEDURE — 92526 ORAL FUNCTION THERAPY: CPT

## 2021-07-13 PROCEDURE — 80048 BASIC METABOLIC PNL TOTAL CA: CPT | Performed by: INTERNAL MEDICINE

## 2021-07-13 PROCEDURE — 63600175 PHARM REV CODE 636 W HCPCS: Performed by: INTERNAL MEDICINE

## 2021-07-13 PROCEDURE — 97530 THERAPEUTIC ACTIVITIES: CPT | Mod: CQ

## 2021-07-13 PROCEDURE — 36415 COLL VENOUS BLD VENIPUNCTURE: CPT | Performed by: INTERNAL MEDICINE

## 2021-07-13 RX ORDER — HYDRALAZINE HYDROCHLORIDE 25 MG/1
25 TABLET, FILM COATED ORAL EVERY 8 HOURS
Status: DISCONTINUED | OUTPATIENT
Start: 2021-07-13 | End: 2021-07-15

## 2021-07-13 RX ADMIN — ASPIRIN 81 MG: 81 TABLET, COATED ORAL at 08:07

## 2021-07-13 RX ADMIN — DEXTROSE MONOHYDRATE AND SODIUM CHLORIDE: 5; .9 INJECTION, SOLUTION INTRAVENOUS at 09:07

## 2021-07-13 RX ADMIN — HYDRALAZINE HYDROCHLORIDE 25 MG: 25 TABLET, FILM COATED ORAL at 09:07

## 2021-07-13 RX ADMIN — PANTOPRAZOLE SODIUM 40 MG: 40 INJECTION, POWDER, FOR SOLUTION INTRAVENOUS at 08:07

## 2021-07-13 RX ADMIN — CLOPIDOGREL 75 MG: 75 TABLET, FILM COATED ORAL at 08:07

## 2021-07-13 RX ADMIN — HYDRALAZINE HYDROCHLORIDE 10 MG: 20 INJECTION INTRAMUSCULAR; INTRAVENOUS at 08:07

## 2021-07-13 RX ADMIN — AMLODIPINE BESYLATE 10 MG: 5 TABLET ORAL at 08:07

## 2021-07-13 RX ADMIN — DEXTROSE MONOHYDRATE AND SODIUM CHLORIDE: 5; .9 INJECTION, SOLUTION INTRAVENOUS at 07:07

## 2021-07-13 RX ADMIN — HYDRALAZINE HYDROCHLORIDE 25 MG: 25 TABLET, FILM COATED ORAL at 01:07

## 2021-07-13 RX ADMIN — ATORVASTATIN CALCIUM 40 MG: 40 TABLET, FILM COATED ORAL at 08:07

## 2021-07-14 LAB
ANION GAP SERPL CALC-SCNC: 8 MMOL/L (ref 8–16)
BUN SERPL-MCNC: 18 MG/DL (ref 6–20)
CALCIUM SERPL-MCNC: 8.1 MG/DL (ref 8.7–10.5)
CHLORIDE SERPL-SCNC: 107 MMOL/L (ref 95–110)
CO2 SERPL-SCNC: 20 MMOL/L (ref 23–29)
CREAT SERPL-MCNC: 1.1 MG/DL (ref 0.5–1.4)
EST. GFR  (AFRICAN AMERICAN): >60 ML/MIN/1.73 M^2
EST. GFR  (NON AFRICAN AMERICAN): >60 ML/MIN/1.73 M^2
GLUCOSE SERPL-MCNC: 148 MG/DL (ref 70–110)
MAGNESIUM SERPL-MCNC: 1.7 MG/DL (ref 1.6–2.6)
POCT GLUCOSE: 129 MG/DL (ref 70–110)
POCT GLUCOSE: 151 MG/DL (ref 70–110)
POCT GLUCOSE: 162 MG/DL (ref 70–110)
POCT GLUCOSE: 176 MG/DL (ref 70–110)
POTASSIUM SERPL-SCNC: 3.1 MMOL/L (ref 3.5–5.1)
SODIUM SERPL-SCNC: 135 MMOL/L (ref 136–145)

## 2021-07-14 PROCEDURE — 92507 TX SP LANG VOICE COMM INDIV: CPT

## 2021-07-14 PROCEDURE — 80048 BASIC METABOLIC PNL TOTAL CA: CPT | Performed by: INTERNAL MEDICINE

## 2021-07-14 PROCEDURE — 83735 ASSAY OF MAGNESIUM: CPT | Performed by: INTERNAL MEDICINE

## 2021-07-14 PROCEDURE — 25000003 PHARM REV CODE 250: Performed by: INTERNAL MEDICINE

## 2021-07-14 PROCEDURE — 36415 COLL VENOUS BLD VENIPUNCTURE: CPT | Performed by: INTERNAL MEDICINE

## 2021-07-14 PROCEDURE — 63600175 PHARM REV CODE 636 W HCPCS: Performed by: HOSPITALIST

## 2021-07-14 PROCEDURE — 21400001 HC TELEMETRY ROOM

## 2021-07-14 PROCEDURE — 92526 ORAL FUNCTION THERAPY: CPT

## 2021-07-14 PROCEDURE — C9113 INJ PANTOPRAZOLE SODIUM, VIA: HCPCS | Performed by: HOSPITALIST

## 2021-07-14 PROCEDURE — 97535 SELF CARE MNGMENT TRAINING: CPT

## 2021-07-14 PROCEDURE — 63600175 PHARM REV CODE 636 W HCPCS: Performed by: INTERNAL MEDICINE

## 2021-07-14 RX ORDER — POTASSIUM CHLORIDE 7.45 MG/ML
10 INJECTION INTRAVENOUS
Status: COMPLETED | OUTPATIENT
Start: 2021-07-14 | End: 2021-07-14

## 2021-07-14 RX ADMIN — HYDRALAZINE HYDROCHLORIDE 25 MG: 25 TABLET, FILM COATED ORAL at 09:07

## 2021-07-14 RX ADMIN — POTASSIUM CHLORIDE 10 MEQ: 10 INJECTION, SOLUTION INTRAVENOUS at 02:07

## 2021-07-14 RX ADMIN — ASPIRIN 81 MG: 81 TABLET, COATED ORAL at 10:07

## 2021-07-14 RX ADMIN — HYDRALAZINE HYDROCHLORIDE 25 MG: 25 TABLET, FILM COATED ORAL at 02:07

## 2021-07-14 RX ADMIN — POTASSIUM CHLORIDE 10 MEQ: 10 INJECTION, SOLUTION INTRAVENOUS at 12:07

## 2021-07-14 RX ADMIN — HYDRALAZINE HYDROCHLORIDE 25 MG: 25 TABLET, FILM COATED ORAL at 05:07

## 2021-07-14 RX ADMIN — POTASSIUM CHLORIDE 10 MEQ: 10 INJECTION, SOLUTION INTRAVENOUS at 03:07

## 2021-07-14 RX ADMIN — ATORVASTATIN CALCIUM 40 MG: 40 TABLET, FILM COATED ORAL at 10:07

## 2021-07-14 RX ADMIN — RIVAROXABAN 15 MG: 15 TABLET, FILM COATED ORAL at 02:07

## 2021-07-14 RX ADMIN — DEXTROSE MONOHYDRATE AND SODIUM CHLORIDE: 5; .9 INJECTION, SOLUTION INTRAVENOUS at 12:07

## 2021-07-14 RX ADMIN — POTASSIUM CHLORIDE 10 MEQ: 10 INJECTION, SOLUTION INTRAVENOUS at 05:07

## 2021-07-14 RX ADMIN — CLOPIDOGREL 75 MG: 75 TABLET, FILM COATED ORAL at 10:07

## 2021-07-14 RX ADMIN — PANTOPRAZOLE SODIUM 40 MG: 40 INJECTION, POWDER, FOR SOLUTION INTRAVENOUS at 10:07

## 2021-07-14 RX ADMIN — AMLODIPINE BESYLATE 10 MG: 5 TABLET ORAL at 10:07

## 2021-07-15 PROBLEM — I82.412 ACUTE DEEP VEIN THROMBOSIS (DVT) OF FEMORAL VEIN OF LEFT LOWER EXTREMITY: Status: ACTIVE | Noted: 2021-07-15

## 2021-07-15 LAB
ANION GAP SERPL CALC-SCNC: 7 MMOL/L (ref 8–16)
BUN SERPL-MCNC: 14 MG/DL (ref 6–20)
CALCIUM SERPL-MCNC: 8.3 MG/DL (ref 8.7–10.5)
CHLORIDE SERPL-SCNC: 106 MMOL/L (ref 95–110)
CO2 SERPL-SCNC: 20 MMOL/L (ref 23–29)
CREAT SERPL-MCNC: 0.9 MG/DL (ref 0.5–1.4)
EST. GFR  (AFRICAN AMERICAN): >60 ML/MIN/1.73 M^2
EST. GFR  (NON AFRICAN AMERICAN): >60 ML/MIN/1.73 M^2
GLUCOSE SERPL-MCNC: 132 MG/DL (ref 70–110)
POCT GLUCOSE: 121 MG/DL (ref 70–110)
POCT GLUCOSE: 138 MG/DL (ref 70–110)
POCT GLUCOSE: 148 MG/DL (ref 70–110)
POCT GLUCOSE: 148 MG/DL (ref 70–110)
POTASSIUM SERPL-SCNC: 3.3 MMOL/L (ref 3.5–5.1)
SODIUM SERPL-SCNC: 133 MMOL/L (ref 136–145)

## 2021-07-15 PROCEDURE — 92507 TX SP LANG VOICE COMM INDIV: CPT

## 2021-07-15 PROCEDURE — 92526 ORAL FUNCTION THERAPY: CPT

## 2021-07-15 PROCEDURE — 21400001 HC TELEMETRY ROOM

## 2021-07-15 PROCEDURE — 99223 1ST HOSP IP/OBS HIGH 75: CPT | Mod: ,,, | Performed by: INTERNAL MEDICINE

## 2021-07-15 PROCEDURE — 25000003 PHARM REV CODE 250: Performed by: HOSPITALIST

## 2021-07-15 PROCEDURE — 25000003 PHARM REV CODE 250: Performed by: INTERNAL MEDICINE

## 2021-07-15 PROCEDURE — 63600175 PHARM REV CODE 636 W HCPCS: Performed by: HOSPITALIST

## 2021-07-15 PROCEDURE — 99223 PR INITIAL HOSPITAL CARE,LEVL III: ICD-10-PCS | Mod: ,,, | Performed by: INTERNAL MEDICINE

## 2021-07-15 PROCEDURE — C9113 INJ PANTOPRAZOLE SODIUM, VIA: HCPCS | Performed by: HOSPITALIST

## 2021-07-15 PROCEDURE — 80048 BASIC METABOLIC PNL TOTAL CA: CPT | Performed by: INTERNAL MEDICINE

## 2021-07-15 PROCEDURE — 36415 COLL VENOUS BLD VENIPUNCTURE: CPT | Performed by: INTERNAL MEDICINE

## 2021-07-15 RX ORDER — ACETAMINOPHEN 325 MG/1
650 TABLET ORAL EVERY 4 HOURS PRN
Status: DISCONTINUED | OUTPATIENT
Start: 2021-07-15 | End: 2021-07-16 | Stop reason: HOSPADM

## 2021-07-15 RX ORDER — ONDANSETRON 2 MG/ML
8 INJECTION INTRAMUSCULAR; INTRAVENOUS EVERY 6 HOURS PRN
Status: DISCONTINUED | OUTPATIENT
Start: 2021-07-15 | End: 2021-07-16 | Stop reason: HOSPADM

## 2021-07-15 RX ORDER — CYCLOBENZAPRINE HCL 5 MG
5 TABLET ORAL ONCE
Status: COMPLETED | OUTPATIENT
Start: 2021-07-15 | End: 2021-07-15

## 2021-07-15 RX ORDER — HYDRALAZINE HYDROCHLORIDE 25 MG/1
50 TABLET, FILM COATED ORAL EVERY 8 HOURS
Status: DISCONTINUED | OUTPATIENT
Start: 2021-07-15 | End: 2021-07-16 | Stop reason: HOSPADM

## 2021-07-15 RX ORDER — POLYETHYLENE GLYCOL 3350 17 G/17G
17 POWDER, FOR SOLUTION ORAL 2 TIMES DAILY PRN
Status: DISCONTINUED | OUTPATIENT
Start: 2021-07-15 | End: 2021-07-16 | Stop reason: HOSPADM

## 2021-07-15 RX ADMIN — AMLODIPINE BESYLATE 10 MG: 5 TABLET ORAL at 09:07

## 2021-07-15 RX ADMIN — CLOPIDOGREL 75 MG: 75 TABLET, FILM COATED ORAL at 09:07

## 2021-07-15 RX ADMIN — PANTOPRAZOLE SODIUM 40 MG: 40 INJECTION, POWDER, FOR SOLUTION INTRAVENOUS at 09:07

## 2021-07-15 RX ADMIN — DEXTROSE MONOHYDRATE AND SODIUM CHLORIDE: 5; .9 INJECTION, SOLUTION INTRAVENOUS at 04:07

## 2021-07-15 RX ADMIN — HYDRALAZINE HYDROCHLORIDE 50 MG: 25 TABLET ORAL at 10:07

## 2021-07-15 RX ADMIN — RIVAROXABAN 15 MG: 15 TABLET, FILM COATED ORAL at 09:07

## 2021-07-15 RX ADMIN — HYDRALAZINE HYDROCHLORIDE 25 MG: 25 TABLET, FILM COATED ORAL at 02:07

## 2021-07-15 RX ADMIN — DOCUSATE SODIUM 100 MG: 100 CAPSULE ORAL at 09:07

## 2021-07-15 RX ADMIN — ATORVASTATIN CALCIUM 40 MG: 40 TABLET, FILM COATED ORAL at 09:07

## 2021-07-15 RX ADMIN — CYCLOBENZAPRINE HYDROCHLORIDE 5 MG: 5 TABLET, FILM COATED ORAL at 04:07

## 2021-07-15 RX ADMIN — RIVAROXABAN 15 MG: 15 TABLET, FILM COATED ORAL at 05:07

## 2021-07-15 RX ADMIN — HYDRALAZINE HYDROCHLORIDE 25 MG: 25 TABLET, FILM COATED ORAL at 06:07

## 2021-07-16 ENCOUNTER — TELEPHONE (OUTPATIENT)
Dept: INTERNAL MEDICINE | Facility: CLINIC | Age: 56
End: 2021-07-16

## 2021-07-16 VITALS
TEMPERATURE: 99 F | HEIGHT: 68 IN | DIASTOLIC BLOOD PRESSURE: 88 MMHG | SYSTOLIC BLOOD PRESSURE: 142 MMHG | RESPIRATION RATE: 18 BRPM | BODY MASS INDEX: 31.44 KG/M2 | OXYGEN SATURATION: 97 % | HEART RATE: 95 BPM | WEIGHT: 207.44 LBS

## 2021-07-16 LAB
POCT GLUCOSE: 140 MG/DL (ref 70–110)
POCT GLUCOSE: 171 MG/DL (ref 70–110)

## 2021-07-16 PROCEDURE — 97535 SELF CARE MNGMENT TRAINING: CPT

## 2021-07-16 PROCEDURE — C9113 INJ PANTOPRAZOLE SODIUM, VIA: HCPCS | Performed by: HOSPITALIST

## 2021-07-16 PROCEDURE — 63600175 PHARM REV CODE 636 W HCPCS: Performed by: HOSPITALIST

## 2021-07-16 PROCEDURE — 25000003 PHARM REV CODE 250: Performed by: INTERNAL MEDICINE

## 2021-07-16 PROCEDURE — 25000003 PHARM REV CODE 250: Performed by: HOSPITALIST

## 2021-07-16 PROCEDURE — 99232 SBSQ HOSP IP/OBS MODERATE 35: CPT | Mod: ,,, | Performed by: PSYCHIATRY & NEUROLOGY

## 2021-07-16 PROCEDURE — 97530 THERAPEUTIC ACTIVITIES: CPT

## 2021-07-16 PROCEDURE — 97116 GAIT TRAINING THERAPY: CPT

## 2021-07-16 PROCEDURE — 99232 PR SUBSEQUENT HOSPITAL CARE,LEVL II: ICD-10-PCS | Mod: ,,, | Performed by: PSYCHIATRY & NEUROLOGY

## 2021-07-16 RX ORDER — AMLODIPINE BESYLATE 10 MG/1
10 TABLET ORAL DAILY
Qty: 30 TABLET | Refills: 11 | Status: SHIPPED | OUTPATIENT
Start: 2021-07-16 | End: 2021-12-03 | Stop reason: SDUPTHER

## 2021-07-16 RX ORDER — CLONIDINE 0.3 MG/24H
1 PATCH, EXTENDED RELEASE TRANSDERMAL
Qty: 4 PATCH | Refills: 11 | Status: SHIPPED | OUTPATIENT
Start: 2021-07-18 | End: 2021-09-20

## 2021-07-16 RX ORDER — DOCUSATE SODIUM 100 MG/1
100 CAPSULE, LIQUID FILLED ORAL DAILY
Refills: 0 | Status: ON HOLD
Start: 2021-07-16 | End: 2021-08-09

## 2021-07-16 RX ORDER — HYDRALAZINE HYDROCHLORIDE 50 MG/1
100 TABLET, FILM COATED ORAL EVERY 8 HOURS
Qty: 180 TABLET | Refills: 11
Start: 2021-07-16 | End: 2021-09-20

## 2021-07-16 RX ADMIN — ATORVASTATIN CALCIUM 40 MG: 40 TABLET, FILM COATED ORAL at 10:07

## 2021-07-16 RX ADMIN — PANTOPRAZOLE SODIUM 40 MG: 40 INJECTION, POWDER, FOR SOLUTION INTRAVENOUS at 10:07

## 2021-07-16 RX ADMIN — AMLODIPINE BESYLATE 10 MG: 5 TABLET ORAL at 10:07

## 2021-07-16 RX ADMIN — RIVAROXABAN 15 MG: 15 TABLET, FILM COATED ORAL at 05:07

## 2021-07-16 RX ADMIN — HYDRALAZINE HYDROCHLORIDE 50 MG: 25 TABLET ORAL at 02:07

## 2021-07-16 RX ADMIN — HYDRALAZINE HYDROCHLORIDE 50 MG: 25 TABLET ORAL at 05:07

## 2021-07-16 RX ADMIN — DOCUSATE SODIUM 100 MG: 100 CAPSULE ORAL at 10:07

## 2021-07-16 RX ADMIN — RIVAROXABAN 15 MG: 15 TABLET, FILM COATED ORAL at 10:07

## 2021-07-16 RX ADMIN — ACETAMINOPHEN 650 MG: 325 TABLET ORAL at 03:07

## 2021-07-16 RX ADMIN — CLOPIDOGREL 75 MG: 75 TABLET, FILM COATED ORAL at 10:07

## 2021-07-19 LAB — POCT GLUCOSE: 121 MG/DL (ref 70–110)

## 2021-07-27 ENCOUNTER — PATIENT OUTREACH (OUTPATIENT)
Dept: ADMINISTRATIVE | Facility: OTHER | Age: 56
End: 2021-07-27

## 2021-08-04 ENCOUNTER — HOSPITAL ENCOUNTER (INPATIENT)
Facility: HOSPITAL | Age: 56
LOS: 6 days | Discharge: NURSING, REHAB OR LONG-TERM CARE | DRG: 175 | End: 2021-08-10
Attending: EMERGENCY MEDICINE | Admitting: INTERNAL MEDICINE
Payer: MEDICAID

## 2021-08-04 ENCOUNTER — HOSPITAL ENCOUNTER (OUTPATIENT)
Dept: RADIOLOGY | Facility: HOSPITAL | Age: 56
Discharge: HOME | End: 2021-08-04
Attending: PHYSICAL MEDICINE & REHABILITATION
Payer: MEDICAID

## 2021-08-04 DIAGNOSIS — R07.9 CHEST PAIN: ICD-10-CM

## 2021-08-04 DIAGNOSIS — I26.99 ACUTE PULMONARY EMBOLISM: ICD-10-CM

## 2021-08-04 DIAGNOSIS — R06.02 SHORTNESS OF BREATH: ICD-10-CM

## 2021-08-04 DIAGNOSIS — I26.99 BILATERAL PULMONARY EMBOLISM: Primary | ICD-10-CM

## 2021-08-04 DIAGNOSIS — I26.99 PULMONARY EMBOLISM: ICD-10-CM

## 2021-08-04 DIAGNOSIS — E11.22 TYPE 2 DIABETES MELLITUS WITH STAGE 3 CHRONIC KIDNEY DISEASE, WITHOUT LONG-TERM CURRENT USE OF INSULIN, UNSPECIFIED WHETHER STAGE 3A OR 3B CKD: ICD-10-CM

## 2021-08-04 DIAGNOSIS — R53.1 WEAKNESS: ICD-10-CM

## 2021-08-04 DIAGNOSIS — N18.30 TYPE 2 DIABETES MELLITUS WITH STAGE 3 CHRONIC KIDNEY DISEASE, WITHOUT LONG-TERM CURRENT USE OF INSULIN, UNSPECIFIED WHETHER STAGE 3A OR 3B CKD: ICD-10-CM

## 2021-08-04 DIAGNOSIS — I26.90 ACUTE SEPTIC PULMONARY EMBOLISM WITHOUT ACUTE COR PULMONALE: ICD-10-CM

## 2021-08-04 DIAGNOSIS — I63.40 CEREBROVASCULAR ACCIDENT (CVA) DUE TO EMBOLISM OF CEREBRAL ARTERY: ICD-10-CM

## 2021-08-04 DIAGNOSIS — I82.412 ACUTE DEEP VEIN THROMBOSIS (DVT) OF FEMORAL VEIN OF LEFT LOWER EXTREMITY: ICD-10-CM

## 2021-08-04 DIAGNOSIS — R33.9 URINARY RETENTION: ICD-10-CM

## 2021-08-04 DIAGNOSIS — N18.30 STAGE 3 CHRONIC KIDNEY DISEASE, UNSPECIFIED WHETHER STAGE 3A OR 3B CKD: ICD-10-CM

## 2021-08-04 DIAGNOSIS — I10 ESSENTIAL HYPERTENSION: ICD-10-CM

## 2021-08-04 PROBLEM — Z86.73 HISTORY OF STROKE: Status: ACTIVE | Noted: 2021-08-04

## 2021-08-04 LAB
ALBUMIN SERPL BCP-MCNC: 3.3 G/DL (ref 3.5–5.2)
ALP SERPL-CCNC: 96 U/L (ref 55–135)
ALT SERPL W/O P-5'-P-CCNC: 18 U/L (ref 10–44)
ANION GAP SERPL CALC-SCNC: 9 MMOL/L (ref 8–16)
AST SERPL-CCNC: 18 U/L (ref 10–40)
BASOPHILS # BLD AUTO: 0.03 K/UL (ref 0–0.2)
BASOPHILS NFR BLD: 0.4 % (ref 0–1.9)
BILIRUB SERPL-MCNC: 0.9 MG/DL (ref 0.1–1)
BNP SERPL-MCNC: 17 PG/ML (ref 0–99)
BUN SERPL-MCNC: 7 MG/DL (ref 6–30)
BUN SERPL-MCNC: 8 MG/DL (ref 6–20)
CALCIUM SERPL-MCNC: 9.3 MG/DL (ref 8.7–10.5)
CHLORIDE SERPL-SCNC: 102 MMOL/L (ref 95–110)
CHLORIDE SERPL-SCNC: 99 MMOL/L (ref 95–110)
CO2 SERPL-SCNC: 26 MMOL/L (ref 23–29)
CREAT SERPL-MCNC: 1 MG/DL (ref 0.5–1.4)
CREAT SERPL-MCNC: 1 MG/DL (ref 0.5–1.4)
CTP QC/QA: YES
DIFFERENTIAL METHOD: ABNORMAL
EOSINOPHIL # BLD AUTO: 0.2 K/UL (ref 0–0.5)
EOSINOPHIL NFR BLD: 3.6 % (ref 0–8)
ERYTHROCYTE [DISTWIDTH] IN BLOOD BY AUTOMATED COUNT: 13.5 % (ref 11.5–14.5)
EST. GFR  (AFRICAN AMERICAN): >60 ML/MIN/1.73 M^2
EST. GFR  (NON AFRICAN AMERICAN): >60 ML/MIN/1.73 M^2
GLUCOSE SERPL-MCNC: 150 MG/DL (ref 70–110)
GLUCOSE SERPL-MCNC: 156 MG/DL (ref 70–110)
HCT VFR BLD AUTO: 37.7 % (ref 40–54)
HCT VFR BLD CALC: 39 %PCV (ref 36–54)
HGB BLD-MCNC: 12.9 G/DL (ref 14–18)
IMM GRANULOCYTES # BLD AUTO: 0.02 K/UL (ref 0–0.04)
IMM GRANULOCYTES NFR BLD AUTO: 0.3 % (ref 0–0.5)
INR PPP: 1.3 (ref 0.8–1.2)
LYMPHOCYTES # BLD AUTO: 1.3 K/UL (ref 1–4.8)
LYMPHOCYTES NFR BLD: 18.7 % (ref 18–48)
MCH RBC QN AUTO: 27.3 PG (ref 27–31)
MCHC RBC AUTO-ENTMCNC: 34.2 G/DL (ref 32–36)
MCV RBC AUTO: 80 FL (ref 82–98)
MONOCYTES # BLD AUTO: 0.6 K/UL (ref 0.3–1)
MONOCYTES NFR BLD: 8.1 % (ref 4–15)
NEUTROPHILS # BLD AUTO: 4.7 K/UL (ref 1.8–7.7)
NEUTROPHILS NFR BLD: 68.9 % (ref 38–73)
NRBC BLD-RTO: 0 /100 WBC
PLATELET # BLD AUTO: 257 K/UL (ref 150–450)
PMV BLD AUTO: 9.3 FL (ref 9.2–12.9)
POC IONIZED CALCIUM: 1.21 MMOL/L (ref 1.06–1.42)
POC TCO2 (MEASURED): 24 MMOL/L (ref 23–29)
POCT GLUCOSE: 138 MG/DL (ref 70–110)
POTASSIUM BLD-SCNC: 3.4 MMOL/L (ref 3.5–5.1)
POTASSIUM SERPL-SCNC: 3.4 MMOL/L (ref 3.5–5.1)
PROT SERPL-MCNC: 7 G/DL (ref 6–8.4)
PROTHROMBIN TIME: 14.2 SEC (ref 9–12.5)
RBC # BLD AUTO: 4.72 M/UL (ref 4.6–6.2)
SAMPLE: ABNORMAL
SARS-COV-2 RDRP RESP QL NAA+PROBE: NEGATIVE
SODIUM BLD-SCNC: 140 MMOL/L (ref 136–145)
SODIUM SERPL-SCNC: 137 MMOL/L (ref 136–145)
TROPONIN I SERPL DL<=0.01 NG/ML-MCNC: 0.12 NG/ML (ref 0–0.03)
WBC # BLD AUTO: 6.75 K/UL (ref 3.9–12.7)

## 2021-08-04 PROCEDURE — 93005 ELECTROCARDIOGRAM TRACING: CPT

## 2021-08-04 PROCEDURE — 87389 HIV-1 AG W/HIV-1&-2 AB AG IA: CPT | Performed by: EMERGENCY MEDICINE

## 2021-08-04 PROCEDURE — 84484 ASSAY OF TROPONIN QUANT: CPT | Performed by: PHYSICIAN ASSISTANT

## 2021-08-04 PROCEDURE — U0002 COVID-19 LAB TEST NON-CDC: HCPCS | Performed by: PHYSICIAN ASSISTANT

## 2021-08-04 PROCEDURE — 85025 COMPLETE CBC W/AUTO DIFF WBC: CPT | Performed by: PHYSICIAN ASSISTANT

## 2021-08-04 PROCEDURE — 80053 COMPREHEN METABOLIC PANEL: CPT | Performed by: PHYSICIAN ASSISTANT

## 2021-08-04 PROCEDURE — 93010 ELECTROCARDIOGRAM REPORT: CPT | Mod: ,,, | Performed by: INTERNAL MEDICINE

## 2021-08-04 PROCEDURE — 20000000 HC ICU ROOM

## 2021-08-04 PROCEDURE — 93010 EKG 12-LEAD: ICD-10-PCS | Mod: ,,, | Performed by: INTERNAL MEDICINE

## 2021-08-04 PROCEDURE — 83880 ASSAY OF NATRIURETIC PEPTIDE: CPT | Performed by: PHYSICIAN ASSISTANT

## 2021-08-04 PROCEDURE — 25000003 PHARM REV CODE 250: Performed by: STUDENT IN AN ORGANIZED HEALTH CARE EDUCATION/TRAINING PROGRAM

## 2021-08-04 PROCEDURE — 86803 HEPATITIS C AB TEST: CPT | Performed by: EMERGENCY MEDICINE

## 2021-08-04 PROCEDURE — 99223 PR INITIAL HOSPITAL CARE,LEVL III: ICD-10-PCS | Mod: ,,, | Performed by: INTERNAL MEDICINE

## 2021-08-04 PROCEDURE — 99232 PR SUBSEQUENT HOSPITAL CARE,LEVL II: ICD-10-PCS | Mod: ,,, | Performed by: PHYSICAL MEDICINE & REHABILITATION

## 2021-08-04 PROCEDURE — 99223 1ST HOSP IP/OBS HIGH 75: CPT | Mod: ,,, | Performed by: INTERNAL MEDICINE

## 2021-08-04 PROCEDURE — 85610 PROTHROMBIN TIME: CPT | Performed by: STUDENT IN AN ORGANIZED HEALTH CARE EDUCATION/TRAINING PROGRAM

## 2021-08-04 PROCEDURE — 80047 BASIC METABLC PNL IONIZED CA: CPT

## 2021-08-04 PROCEDURE — 99232 SBSQ HOSP IP/OBS MODERATE 35: CPT | Mod: ,,, | Performed by: PHYSICAL MEDICINE & REHABILITATION

## 2021-08-04 PROCEDURE — 99291 CRITICAL CARE FIRST HOUR: CPT | Mod: CS,,, | Performed by: PHYSICIAN ASSISTANT

## 2021-08-04 PROCEDURE — 99291 CRITICAL CARE FIRST HOUR: CPT | Mod: 25

## 2021-08-04 PROCEDURE — 63600175 PHARM REV CODE 636 W HCPCS: Performed by: STUDENT IN AN ORGANIZED HEALTH CARE EDUCATION/TRAINING PROGRAM

## 2021-08-04 PROCEDURE — 99291 PR CRITICAL CARE, E/M 30-74 MINUTES: ICD-10-PCS | Mod: CS,,, | Performed by: PHYSICIAN ASSISTANT

## 2021-08-04 RX ORDER — CARVEDILOL 12.5 MG/1
12.5 TABLET ORAL 2 TIMES DAILY
Status: DISCONTINUED | OUTPATIENT
Start: 2021-08-04 | End: 2021-08-10 | Stop reason: HOSPADM

## 2021-08-04 RX ORDER — WARFARIN SODIUM 5 MG/1
5 TABLET ORAL ONCE
Status: COMPLETED | OUTPATIENT
Start: 2021-08-04 | End: 2021-08-05

## 2021-08-04 RX ORDER — CLONIDINE 0.3 MG/24H
1 PATCH, EXTENDED RELEASE TRANSDERMAL
Status: DISCONTINUED | OUTPATIENT
Start: 2021-08-05 | End: 2021-08-10 | Stop reason: HOSPADM

## 2021-08-04 RX ORDER — CLOPIDOGREL BISULFATE 75 MG/1
75 TABLET ORAL DAILY
Status: DISCONTINUED | OUTPATIENT
Start: 2021-08-05 | End: 2021-08-10 | Stop reason: HOSPADM

## 2021-08-04 RX ORDER — AMLODIPINE BESYLATE 10 MG/1
10 TABLET ORAL DAILY
Status: DISCONTINUED | OUTPATIENT
Start: 2021-08-05 | End: 2021-08-10 | Stop reason: HOSPADM

## 2021-08-04 RX ORDER — INSULIN ASPART 100 [IU]/ML
0-5 INJECTION, SOLUTION INTRAVENOUS; SUBCUTANEOUS
Status: DISCONTINUED | OUTPATIENT
Start: 2021-08-04 | End: 2021-08-10 | Stop reason: HOSPADM

## 2021-08-04 RX ORDER — IBUPROFEN 200 MG
24 TABLET ORAL
Status: DISCONTINUED | OUTPATIENT
Start: 2021-08-04 | End: 2021-08-10 | Stop reason: HOSPADM

## 2021-08-04 RX ORDER — DOCUSATE SODIUM 100 MG/1
100 CAPSULE, LIQUID FILLED ORAL DAILY
Status: DISCONTINUED | OUTPATIENT
Start: 2021-08-05 | End: 2021-08-10 | Stop reason: HOSPADM

## 2021-08-04 RX ORDER — ENOXAPARIN SODIUM 100 MG/ML
1 INJECTION SUBCUTANEOUS
Status: DISCONTINUED | OUTPATIENT
Start: 2021-08-04 | End: 2021-08-05

## 2021-08-04 RX ORDER — HYDRALAZINE HYDROCHLORIDE 50 MG/1
100 TABLET, FILM COATED ORAL EVERY 8 HOURS
Status: DISCONTINUED | OUTPATIENT
Start: 2021-08-04 | End: 2021-08-10 | Stop reason: HOSPADM

## 2021-08-04 RX ORDER — IBUPROFEN 200 MG
16 TABLET ORAL
Status: DISCONTINUED | OUTPATIENT
Start: 2021-08-04 | End: 2021-08-10 | Stop reason: HOSPADM

## 2021-08-04 RX ORDER — ATORVASTATIN CALCIUM 20 MG/1
80 TABLET, FILM COATED ORAL DAILY
Status: DISCONTINUED | OUTPATIENT
Start: 2021-08-05 | End: 2021-08-10 | Stop reason: HOSPADM

## 2021-08-04 RX ORDER — GLUCAGON 1 MG
1 KIT INJECTION
Status: DISCONTINUED | OUTPATIENT
Start: 2021-08-04 | End: 2021-08-10 | Stop reason: HOSPADM

## 2021-08-04 RX ORDER — SODIUM CHLORIDE 0.9 % (FLUSH) 0.9 %
10 SYRINGE (ML) INJECTION
Status: DISCONTINUED | OUTPATIENT
Start: 2021-08-04 | End: 2021-08-10 | Stop reason: HOSPADM

## 2021-08-04 RX ORDER — ACETAMINOPHEN 325 MG/1
650 TABLET ORAL EVERY 4 HOURS PRN
Status: DISCONTINUED | OUTPATIENT
Start: 2021-08-04 | End: 2021-08-10 | Stop reason: HOSPADM

## 2021-08-04 RX ADMIN — HYDRALAZINE HYDROCHLORIDE 100 MG: 50 TABLET ORAL at 10:08

## 2021-08-04 RX ADMIN — ACETAMINOPHEN 650 MG: 325 TABLET ORAL at 10:08

## 2021-08-04 RX ADMIN — ENOXAPARIN SODIUM 90 MG: 100 INJECTION SUBCUTANEOUS at 10:08

## 2021-08-04 RX ADMIN — CARVEDILOL 12.5 MG: 12.5 TABLET, FILM COATED ORAL at 10:08

## 2021-08-05 ENCOUNTER — PATIENT OUTREACH (OUTPATIENT)
Dept: ADMINISTRATIVE | Facility: HOSPITAL | Age: 56
End: 2021-08-05

## 2021-08-05 PROBLEM — I26.99 BILATERAL PULMONARY EMBOLISM: Status: ACTIVE | Noted: 2021-08-05

## 2021-08-05 LAB
ANION GAP SERPL CALC-SCNC: 12 MMOL/L (ref 8–16)
ASCENDING AORTA: 4.07 CM
AV INDEX (PROSTH): 0.91
AV MEAN GRADIENT: 3 MMHG
AV PEAK GRADIENT: 4 MMHG
AV VALVE AREA: 4.43 CM2
AV VELOCITY RATIO: 0.78
BASOPHILS # BLD AUTO: 0.02 K/UL (ref 0–0.2)
BASOPHILS NFR BLD: 0.3 % (ref 0–1.9)
BSA FOR ECHO PROCEDURE: 2.02 M2
BUN SERPL-MCNC: 10 MG/DL (ref 6–20)
CALCIUM SERPL-MCNC: 9.6 MG/DL (ref 8.7–10.5)
CHLORIDE SERPL-SCNC: 103 MMOL/L (ref 95–110)
CO2 SERPL-SCNC: 21 MMOL/L (ref 23–29)
CREAT SERPL-MCNC: 1 MG/DL (ref 0.5–1.4)
CV ECHO LV RWT: 0.48 CM
DIFFERENTIAL METHOD: ABNORMAL
DOP CALC AO PEAK VEL: 0.97 M/S
DOP CALC AO VTI: 13.94 CM
DOP CALC LVOT AREA: 4.9 CM2
DOP CALC LVOT DIAMETER: 2.49 CM
DOP CALC LVOT PEAK VEL: 0.76 M/S
DOP CALC LVOT STROKE VOLUME: 61.71 CM3
DOP CALCLVOT PEAK VEL VTI: 12.68 CM
E WAVE DECELERATION TIME: 57.38 MSEC
E/A RATIO: 0.89
E/E' RATIO: 5.33 M/S
ECHO LV POSTERIOR WALL: 1.02 CM (ref 0.6–1.1)
EJECTION FRACTION: 60 %
EOSINOPHIL # BLD AUTO: 0.2 K/UL (ref 0–0.5)
EOSINOPHIL NFR BLD: 2.5 % (ref 0–8)
ERYTHROCYTE [DISTWIDTH] IN BLOOD BY AUTOMATED COUNT: 13.7 % (ref 11.5–14.5)
EST. GFR  (AFRICAN AMERICAN): >60 ML/MIN/1.73 M^2
EST. GFR  (NON AFRICAN AMERICAN): >60 ML/MIN/1.73 M^2
FRACTIONAL SHORTENING: 24 % (ref 28–44)
GLUCOSE SERPL-MCNC: 145 MG/DL (ref 70–110)
HCT VFR BLD AUTO: 36.9 % (ref 40–54)
HCV AB SERPL QL IA: NEGATIVE
HGB BLD-MCNC: 12.5 G/DL (ref 14–18)
HIV 1+2 AB+HIV1 P24 AG SERPL QL IA: NEGATIVE
IMM GRANULOCYTES # BLD AUTO: 0.02 K/UL (ref 0–0.04)
IMM GRANULOCYTES NFR BLD AUTO: 0.3 % (ref 0–0.5)
INTERVENTRICULAR SEPTUM: 1.05 CM (ref 0.6–1.1)
LA MAJOR: 4.49 CM
LA MINOR: 4.28 CM
LA WIDTH: 2.81 CM
LEFT ATRIUM SIZE: 3.6 CM
LEFT ATRIUM VOLUME INDEX: 19 ML/M2
LEFT ATRIUM VOLUME: 37.68 CM3
LEFT INTERNAL DIMENSION IN SYSTOLE: 3.25 CM (ref 2.1–4)
LEFT VENTRICLE DIASTOLIC VOLUME INDEX: 40.96 ML/M2
LEFT VENTRICLE DIASTOLIC VOLUME: 81.11 ML
LEFT VENTRICLE MASS INDEX: 74 G/M2
LEFT VENTRICLE SYSTOLIC VOLUME INDEX: 21.5 ML/M2
LEFT VENTRICLE SYSTOLIC VOLUME: 42.52 ML
LEFT VENTRICULAR INTERNAL DIMENSION IN DIASTOLE: 4.26 CM (ref 3.5–6)
LEFT VENTRICULAR MASS: 147.3 G
LV LATERAL E/E' RATIO: 4.67 M/S
LV SEPTAL E/E' RATIO: 6.22 M/S
LYMPHOCYTES # BLD AUTO: 1.5 K/UL (ref 1–4.8)
LYMPHOCYTES NFR BLD: 20.7 % (ref 18–48)
MAGNESIUM SERPL-MCNC: 1.6 MG/DL (ref 1.6–2.6)
MCH RBC QN AUTO: 26.9 PG (ref 27–31)
MCHC RBC AUTO-ENTMCNC: 33.9 G/DL (ref 32–36)
MCV RBC AUTO: 80 FL (ref 82–98)
MONOCYTES # BLD AUTO: 0.7 K/UL (ref 0.3–1)
MONOCYTES NFR BLD: 9.5 % (ref 4–15)
MV PEAK A VEL: 0.63 M/S
MV PEAK E VEL: 0.56 M/S
MV STENOSIS PRESSURE HALF TIME: 16.64 MS
MV VALVE AREA P 1/2 METHOD: 13.22 CM2
NEUTROPHILS # BLD AUTO: 4.8 K/UL (ref 1.8–7.7)
NEUTROPHILS NFR BLD: 66.7 % (ref 38–73)
NRBC BLD-RTO: 0 /100 WBC
PHOSPHATE SERPL-MCNC: 3.1 MG/DL (ref 2.7–4.5)
PISA TR MAX VEL: 2.34 M/S
PLATELET # BLD AUTO: 268 K/UL (ref 150–450)
PMV BLD AUTO: 9.3 FL (ref 9.2–12.9)
POCT GLUCOSE: 126 MG/DL (ref 70–110)
POCT GLUCOSE: 129 MG/DL (ref 70–110)
POCT GLUCOSE: 134 MG/DL (ref 70–110)
POTASSIUM SERPL-SCNC: 3.5 MMOL/L (ref 3.5–5.1)
RA PRESSURE: 8 MMHG
RBC # BLD AUTO: 4.64 M/UL (ref 4.6–6.2)
SINUS: 3.47 CM
SODIUM SERPL-SCNC: 136 MMOL/L (ref 136–145)
STJ: 3.46 CM
TDI LATERAL: 0.12 M/S
TDI SEPTAL: 0.09 M/S
TDI: 0.11 M/S
TR MAX PG: 22 MMHG
TRICUSPID ANNULAR PLANE SYSTOLIC EXCURSION: 1.81 CM
TV REST PULMONARY ARTERY PRESSURE: 30 MMHG
WBC # BLD AUTO: 7.25 K/UL (ref 3.9–12.7)

## 2021-08-05 PROCEDURE — 63600175 PHARM REV CODE 636 W HCPCS: Performed by: INTERNAL MEDICINE

## 2021-08-05 PROCEDURE — 20600001 HC STEP DOWN PRIVATE ROOM

## 2021-08-05 PROCEDURE — 94761 N-INVAS EAR/PLS OXIMETRY MLT: CPT

## 2021-08-05 PROCEDURE — 25000003 PHARM REV CODE 250: Performed by: INTERNAL MEDICINE

## 2021-08-05 PROCEDURE — 83735 ASSAY OF MAGNESIUM: CPT | Performed by: STUDENT IN AN ORGANIZED HEALTH CARE EDUCATION/TRAINING PROGRAM

## 2021-08-05 PROCEDURE — 85025 COMPLETE CBC W/AUTO DIFF WBC: CPT | Performed by: STUDENT IN AN ORGANIZED HEALTH CARE EDUCATION/TRAINING PROGRAM

## 2021-08-05 PROCEDURE — 25000003 PHARM REV CODE 250: Performed by: STUDENT IN AN ORGANIZED HEALTH CARE EDUCATION/TRAINING PROGRAM

## 2021-08-05 PROCEDURE — 99900035 HC TECH TIME PER 15 MIN (STAT)

## 2021-08-05 PROCEDURE — 84100 ASSAY OF PHOSPHORUS: CPT | Performed by: STUDENT IN AN ORGANIZED HEALTH CARE EDUCATION/TRAINING PROGRAM

## 2021-08-05 PROCEDURE — 80048 BASIC METABOLIC PNL TOTAL CA: CPT | Performed by: STUDENT IN AN ORGANIZED HEALTH CARE EDUCATION/TRAINING PROGRAM

## 2021-08-05 PROCEDURE — 27000221 HC OXYGEN, UP TO 24 HOURS

## 2021-08-05 PROCEDURE — 63600175 PHARM REV CODE 636 W HCPCS: Performed by: STUDENT IN AN ORGANIZED HEALTH CARE EDUCATION/TRAINING PROGRAM

## 2021-08-05 RX ORDER — MAGNESIUM SULFATE HEPTAHYDRATE 40 MG/ML
2 INJECTION, SOLUTION INTRAVENOUS ONCE
Status: COMPLETED | OUTPATIENT
Start: 2021-08-05 | End: 2021-08-05

## 2021-08-05 RX ORDER — POTASSIUM CHLORIDE 20 MEQ/1
40 TABLET, EXTENDED RELEASE ORAL ONCE
Status: COMPLETED | OUTPATIENT
Start: 2021-08-05 | End: 2021-08-05

## 2021-08-05 RX ORDER — MUPIROCIN 20 MG/G
OINTMENT TOPICAL 2 TIMES DAILY
Status: COMPLETED | OUTPATIENT
Start: 2021-08-05 | End: 2021-08-09

## 2021-08-05 RX ADMIN — POTASSIUM CHLORIDE 40 MEQ: 1500 TABLET, EXTENDED RELEASE ORAL at 08:08

## 2021-08-05 RX ADMIN — MAGNESIUM SULFATE 2 G: 2 INJECTION INTRAVENOUS at 08:08

## 2021-08-05 RX ADMIN — HYDRALAZINE HYDROCHLORIDE 100 MG: 50 TABLET ORAL at 03:08

## 2021-08-05 RX ADMIN — ENOXAPARIN SODIUM 90 MG: 100 INJECTION SUBCUTANEOUS at 11:08

## 2021-08-05 RX ADMIN — ATORVASTATIN CALCIUM 80 MG: 20 TABLET, FILM COATED ORAL at 08:08

## 2021-08-05 RX ADMIN — MUPIROCIN: 20 OINTMENT TOPICAL at 08:08

## 2021-08-05 RX ADMIN — CARVEDILOL 12.5 MG: 12.5 TABLET, FILM COATED ORAL at 08:08

## 2021-08-05 RX ADMIN — CLOPIDOGREL 75 MG: 75 TABLET, FILM COATED ORAL at 08:08

## 2021-08-05 RX ADMIN — WARFARIN SODIUM 5 MG: 5 TABLET ORAL at 01:08

## 2021-08-05 RX ADMIN — ACETAMINOPHEN 650 MG: 325 TABLET ORAL at 04:08

## 2021-08-05 RX ADMIN — CLONIDINE 1 PATCH: 0.3 PATCH TRANSDERMAL at 11:08

## 2021-08-05 RX ADMIN — DOCUSATE SODIUM 100 MG: 100 CAPSULE, LIQUID FILLED ORAL at 08:08

## 2021-08-05 RX ADMIN — APIXABAN 10 MG: 5 TABLET, FILM COATED ORAL at 09:08

## 2021-08-05 RX ADMIN — CARVEDILOL 12.5 MG: 12.5 TABLET, FILM COATED ORAL at 09:08

## 2021-08-05 RX ADMIN — AMLODIPINE BESYLATE 10 MG: 10 TABLET ORAL at 08:08

## 2021-08-05 RX ADMIN — MUPIROCIN: 20 OINTMENT TOPICAL at 09:08

## 2021-08-05 RX ADMIN — HYDRALAZINE HYDROCHLORIDE 100 MG: 50 TABLET ORAL at 09:08

## 2021-08-06 LAB
ALBUMIN SERPL BCP-MCNC: 3.1 G/DL (ref 3.5–5.2)
ALP SERPL-CCNC: 87 U/L (ref 55–135)
ALT SERPL W/O P-5'-P-CCNC: 12 U/L (ref 10–44)
ANION GAP SERPL CALC-SCNC: 10 MMOL/L (ref 8–16)
AST SERPL-CCNC: 13 U/L (ref 10–40)
BASOPHILS # BLD AUTO: 0.03 K/UL (ref 0–0.2)
BASOPHILS NFR BLD: 0.4 % (ref 0–1.9)
BILIRUB SERPL-MCNC: 1 MG/DL (ref 0.1–1)
BUN SERPL-MCNC: 16 MG/DL (ref 6–20)
CALCIUM SERPL-MCNC: 9.6 MG/DL (ref 8.7–10.5)
CHLORIDE SERPL-SCNC: 104 MMOL/L (ref 95–110)
CO2 SERPL-SCNC: 24 MMOL/L (ref 23–29)
CREAT SERPL-MCNC: 0.9 MG/DL (ref 0.5–1.4)
DIFFERENTIAL METHOD: ABNORMAL
EOSINOPHIL # BLD AUTO: 0.2 K/UL (ref 0–0.5)
EOSINOPHIL NFR BLD: 1.9 % (ref 0–8)
ERYTHROCYTE [DISTWIDTH] IN BLOOD BY AUTOMATED COUNT: 13.7 % (ref 11.5–14.5)
EST. GFR  (AFRICAN AMERICAN): >60 ML/MIN/1.73 M^2
EST. GFR  (NON AFRICAN AMERICAN): >60 ML/MIN/1.73 M^2
GLUCOSE SERPL-MCNC: 112 MG/DL (ref 70–110)
HCT VFR BLD AUTO: 37.3 % (ref 40–54)
HGB BLD-MCNC: 12.4 G/DL (ref 14–18)
IMM GRANULOCYTES # BLD AUTO: 0.03 K/UL (ref 0–0.04)
IMM GRANULOCYTES NFR BLD AUTO: 0.4 % (ref 0–0.5)
LYMPHOCYTES # BLD AUTO: 1.3 K/UL (ref 1–4.8)
LYMPHOCYTES NFR BLD: 15.2 % (ref 18–48)
MCH RBC QN AUTO: 27.1 PG (ref 27–31)
MCHC RBC AUTO-ENTMCNC: 33.2 G/DL (ref 32–36)
MCV RBC AUTO: 81 FL (ref 82–98)
MONOCYTES # BLD AUTO: 0.7 K/UL (ref 0.3–1)
MONOCYTES NFR BLD: 8.7 % (ref 4–15)
NEUTROPHILS # BLD AUTO: 6.3 K/UL (ref 1.8–7.7)
NEUTROPHILS NFR BLD: 73.4 % (ref 38–73)
NRBC BLD-RTO: 0 /100 WBC
PLATELET # BLD AUTO: 288 K/UL (ref 150–450)
PMV BLD AUTO: 9.3 FL (ref 9.2–12.9)
POCT GLUCOSE: 114 MG/DL (ref 70–110)
POCT GLUCOSE: 116 MG/DL (ref 70–110)
POCT GLUCOSE: 116 MG/DL (ref 70–110)
POTASSIUM SERPL-SCNC: 3.8 MMOL/L (ref 3.5–5.1)
PROT SERPL-MCNC: 6.8 G/DL (ref 6–8.4)
RBC # BLD AUTO: 4.58 M/UL (ref 4.6–6.2)
SODIUM SERPL-SCNC: 138 MMOL/L (ref 136–145)
WBC # BLD AUTO: 8.5 K/UL (ref 3.9–12.7)

## 2021-08-06 PROCEDURE — 25000003 PHARM REV CODE 250: Performed by: INTERNAL MEDICINE

## 2021-08-06 PROCEDURE — 85025 COMPLETE CBC W/AUTO DIFF WBC: CPT | Performed by: INTERNAL MEDICINE

## 2021-08-06 PROCEDURE — 99232 PR SUBSEQUENT HOSPITAL CARE,LEVL II: ICD-10-PCS | Mod: ,,, | Performed by: INTERNAL MEDICINE

## 2021-08-06 PROCEDURE — 36415 COLL VENOUS BLD VENIPUNCTURE: CPT | Performed by: INTERNAL MEDICINE

## 2021-08-06 PROCEDURE — 97530 THERAPEUTIC ACTIVITIES: CPT

## 2021-08-06 PROCEDURE — 97535 SELF CARE MNGMENT TRAINING: CPT

## 2021-08-06 PROCEDURE — 80053 COMPREHEN METABOLIC PANEL: CPT | Performed by: INTERNAL MEDICINE

## 2021-08-06 PROCEDURE — 51798 US URINE CAPACITY MEASURE: CPT

## 2021-08-06 PROCEDURE — 20600001 HC STEP DOWN PRIVATE ROOM

## 2021-08-06 PROCEDURE — 99232 SBSQ HOSP IP/OBS MODERATE 35: CPT | Mod: ,,, | Performed by: INTERNAL MEDICINE

## 2021-08-06 PROCEDURE — 51701 INSERT BLADDER CATHETER: CPT

## 2021-08-06 PROCEDURE — 97162 PT EVAL MOD COMPLEX 30 MIN: CPT

## 2021-08-06 PROCEDURE — 25000003 PHARM REV CODE 250: Performed by: STUDENT IN AN ORGANIZED HEALTH CARE EDUCATION/TRAINING PROGRAM

## 2021-08-06 RX ORDER — TAMSULOSIN HYDROCHLORIDE 0.4 MG/1
0.4 CAPSULE ORAL NIGHTLY
Status: DISCONTINUED | OUTPATIENT
Start: 2021-08-06 | End: 2021-08-08

## 2021-08-06 RX ADMIN — CARVEDILOL 12.5 MG: 12.5 TABLET, FILM COATED ORAL at 09:08

## 2021-08-06 RX ADMIN — HYDRALAZINE HYDROCHLORIDE 100 MG: 50 TABLET ORAL at 09:08

## 2021-08-06 RX ADMIN — APIXABAN 10 MG: 5 TABLET, FILM COATED ORAL at 09:08

## 2021-08-06 RX ADMIN — ATORVASTATIN CALCIUM 80 MG: 20 TABLET, FILM COATED ORAL at 09:08

## 2021-08-06 RX ADMIN — CLOPIDOGREL 75 MG: 75 TABLET, FILM COATED ORAL at 09:08

## 2021-08-06 RX ADMIN — AMLODIPINE BESYLATE 10 MG: 10 TABLET ORAL at 09:08

## 2021-08-06 RX ADMIN — HYDRALAZINE HYDROCHLORIDE 100 MG: 50 TABLET ORAL at 06:08

## 2021-08-06 RX ADMIN — MUPIROCIN: 20 OINTMENT TOPICAL at 09:08

## 2021-08-06 RX ADMIN — DOCUSATE SODIUM 100 MG: 100 CAPSULE, LIQUID FILLED ORAL at 09:08

## 2021-08-06 RX ADMIN — TAMSULOSIN HYDROCHLORIDE 0.4 MG: 0.4 CAPSULE ORAL at 09:08

## 2021-08-06 RX ADMIN — HYDRALAZINE HYDROCHLORIDE 100 MG: 50 TABLET ORAL at 03:08

## 2021-08-07 PROBLEM — I63.9 CEREBROVASCULAR ACCIDENT (CVA) DUE TO EMBOLISM: Status: ACTIVE | Noted: 2021-08-04

## 2021-08-07 LAB
ALBUMIN SERPL BCP-MCNC: 3.1 G/DL (ref 3.5–5.2)
ALP SERPL-CCNC: 87 U/L (ref 55–135)
ALT SERPL W/O P-5'-P-CCNC: 14 U/L (ref 10–44)
ANION GAP SERPL CALC-SCNC: 7 MMOL/L (ref 8–16)
AST SERPL-CCNC: 13 U/L (ref 10–40)
BASOPHILS # BLD AUTO: 0.02 K/UL (ref 0–0.2)
BASOPHILS NFR BLD: 0.2 % (ref 0–1.9)
BILIRUB SERPL-MCNC: 1.1 MG/DL (ref 0.1–1)
BUN SERPL-MCNC: 18 MG/DL (ref 6–20)
CALCIUM SERPL-MCNC: 9.5 MG/DL (ref 8.7–10.5)
CHLORIDE SERPL-SCNC: 104 MMOL/L (ref 95–110)
CO2 SERPL-SCNC: 25 MMOL/L (ref 23–29)
CREAT SERPL-MCNC: 0.9 MG/DL (ref 0.5–1.4)
DIFFERENTIAL METHOD: ABNORMAL
EOSINOPHIL # BLD AUTO: 0.1 K/UL (ref 0–0.5)
EOSINOPHIL NFR BLD: 1.2 % (ref 0–8)
ERYTHROCYTE [DISTWIDTH] IN BLOOD BY AUTOMATED COUNT: 13.9 % (ref 11.5–14.5)
EST. GFR  (AFRICAN AMERICAN): >60 ML/MIN/1.73 M^2
EST. GFR  (NON AFRICAN AMERICAN): >60 ML/MIN/1.73 M^2
GLUCOSE SERPL-MCNC: 129 MG/DL (ref 70–110)
HCT VFR BLD AUTO: 36.7 % (ref 40–54)
HGB BLD-MCNC: 12 G/DL (ref 14–18)
IMM GRANULOCYTES # BLD AUTO: 0.01 K/UL (ref 0–0.04)
IMM GRANULOCYTES NFR BLD AUTO: 0.1 % (ref 0–0.5)
LYMPHOCYTES # BLD AUTO: 1.4 K/UL (ref 1–4.8)
LYMPHOCYTES NFR BLD: 13.4 % (ref 18–48)
MCH RBC QN AUTO: 26.7 PG (ref 27–31)
MCHC RBC AUTO-ENTMCNC: 32.7 G/DL (ref 32–36)
MCV RBC AUTO: 82 FL (ref 82–98)
MONOCYTES # BLD AUTO: 0.8 K/UL (ref 0.3–1)
MONOCYTES NFR BLD: 8.2 % (ref 4–15)
NEUTROPHILS # BLD AUTO: 7.9 K/UL (ref 1.8–7.7)
NEUTROPHILS NFR BLD: 76.9 % (ref 38–73)
NRBC BLD-RTO: 0 /100 WBC
PLATELET # BLD AUTO: 290 K/UL (ref 150–450)
PMV BLD AUTO: 9.4 FL (ref 9.2–12.9)
POCT GLUCOSE: 117 MG/DL (ref 70–110)
POCT GLUCOSE: 119 MG/DL (ref 70–110)
POCT GLUCOSE: 131 MG/DL (ref 70–110)
POCT GLUCOSE: 145 MG/DL (ref 70–110)
POTASSIUM SERPL-SCNC: 3.6 MMOL/L (ref 3.5–5.1)
PROT SERPL-MCNC: 6.9 G/DL (ref 6–8.4)
RBC # BLD AUTO: 4.5 M/UL (ref 4.6–6.2)
SODIUM SERPL-SCNC: 136 MMOL/L (ref 136–145)
WBC # BLD AUTO: 10.21 K/UL (ref 3.9–12.7)

## 2021-08-07 PROCEDURE — 51798 US URINE CAPACITY MEASURE: CPT

## 2021-08-07 PROCEDURE — 51701 INSERT BLADDER CATHETER: CPT

## 2021-08-07 PROCEDURE — 20600001 HC STEP DOWN PRIVATE ROOM

## 2021-08-07 PROCEDURE — 25000003 PHARM REV CODE 250: Performed by: INTERNAL MEDICINE

## 2021-08-07 PROCEDURE — 85025 COMPLETE CBC W/AUTO DIFF WBC: CPT | Performed by: INTERNAL MEDICINE

## 2021-08-07 PROCEDURE — 99232 SBSQ HOSP IP/OBS MODERATE 35: CPT | Mod: ,,, | Performed by: INTERNAL MEDICINE

## 2021-08-07 PROCEDURE — 25000003 PHARM REV CODE 250: Performed by: STUDENT IN AN ORGANIZED HEALTH CARE EDUCATION/TRAINING PROGRAM

## 2021-08-07 PROCEDURE — 97110 THERAPEUTIC EXERCISES: CPT | Mod: CQ

## 2021-08-07 PROCEDURE — 80053 COMPREHEN METABOLIC PANEL: CPT | Performed by: INTERNAL MEDICINE

## 2021-08-07 PROCEDURE — 97530 THERAPEUTIC ACTIVITIES: CPT | Mod: CQ

## 2021-08-07 PROCEDURE — 99232 PR SUBSEQUENT HOSPITAL CARE,LEVL II: ICD-10-PCS | Mod: ,,, | Performed by: INTERNAL MEDICINE

## 2021-08-07 PROCEDURE — 36415 COLL VENOUS BLD VENIPUNCTURE: CPT | Performed by: INTERNAL MEDICINE

## 2021-08-07 RX ORDER — LOSARTAN POTASSIUM 25 MG/1
25 TABLET ORAL DAILY
Status: DISCONTINUED | OUTPATIENT
Start: 2021-08-07 | End: 2021-08-10 | Stop reason: HOSPADM

## 2021-08-07 RX ADMIN — CARVEDILOL 12.5 MG: 12.5 TABLET, FILM COATED ORAL at 09:08

## 2021-08-07 RX ADMIN — CLOPIDOGREL 75 MG: 75 TABLET, FILM COATED ORAL at 08:08

## 2021-08-07 RX ADMIN — HYDRALAZINE HYDROCHLORIDE 100 MG: 50 TABLET ORAL at 03:08

## 2021-08-07 RX ADMIN — ATORVASTATIN CALCIUM 80 MG: 20 TABLET, FILM COATED ORAL at 08:08

## 2021-08-07 RX ADMIN — APIXABAN 10 MG: 5 TABLET, FILM COATED ORAL at 09:08

## 2021-08-07 RX ADMIN — HYDRALAZINE HYDROCHLORIDE 100 MG: 50 TABLET ORAL at 09:08

## 2021-08-07 RX ADMIN — TAMSULOSIN HYDROCHLORIDE 0.4 MG: 0.4 CAPSULE ORAL at 09:08

## 2021-08-07 RX ADMIN — LOSARTAN POTASSIUM 25 MG: 25 TABLET, FILM COATED ORAL at 11:08

## 2021-08-07 RX ADMIN — MUPIROCIN: 20 OINTMENT TOPICAL at 08:08

## 2021-08-07 RX ADMIN — DOCUSATE SODIUM 100 MG: 100 CAPSULE, LIQUID FILLED ORAL at 08:08

## 2021-08-07 RX ADMIN — CARVEDILOL 12.5 MG: 12.5 TABLET, FILM COATED ORAL at 08:08

## 2021-08-07 RX ADMIN — MUPIROCIN: 20 OINTMENT TOPICAL at 09:08

## 2021-08-07 RX ADMIN — APIXABAN 10 MG: 5 TABLET, FILM COATED ORAL at 08:08

## 2021-08-07 RX ADMIN — AMLODIPINE BESYLATE 10 MG: 10 TABLET ORAL at 08:08

## 2021-08-07 RX ADMIN — HYDRALAZINE HYDROCHLORIDE 100 MG: 50 TABLET ORAL at 06:08

## 2021-08-08 PROBLEM — R33.9 URINARY RETENTION: Status: ACTIVE | Noted: 2021-08-08

## 2021-08-08 LAB
ALBUMIN SERPL BCP-MCNC: 2.9 G/DL (ref 3.5–5.2)
ALP SERPL-CCNC: 80 U/L (ref 55–135)
ALT SERPL W/O P-5'-P-CCNC: 15 U/L (ref 10–44)
ANION GAP SERPL CALC-SCNC: 9 MMOL/L (ref 8–16)
AST SERPL-CCNC: 14 U/L (ref 10–40)
BASOPHILS # BLD AUTO: 0.02 K/UL (ref 0–0.2)
BASOPHILS NFR BLD: 0.2 % (ref 0–1.9)
BILIRUB SERPL-MCNC: 1.1 MG/DL (ref 0.1–1)
BUN SERPL-MCNC: 22 MG/DL (ref 6–20)
CALCIUM SERPL-MCNC: 9.4 MG/DL (ref 8.7–10.5)
CHLORIDE SERPL-SCNC: 104 MMOL/L (ref 95–110)
CO2 SERPL-SCNC: 24 MMOL/L (ref 23–29)
CREAT SERPL-MCNC: 1 MG/DL (ref 0.5–1.4)
DIFFERENTIAL METHOD: ABNORMAL
EOSINOPHIL # BLD AUTO: 0.2 K/UL (ref 0–0.5)
EOSINOPHIL NFR BLD: 1.7 % (ref 0–8)
ERYTHROCYTE [DISTWIDTH] IN BLOOD BY AUTOMATED COUNT: 14 % (ref 11.5–14.5)
EST. GFR  (AFRICAN AMERICAN): >60 ML/MIN/1.73 M^2
EST. GFR  (NON AFRICAN AMERICAN): >60 ML/MIN/1.73 M^2
GLUCOSE SERPL-MCNC: 135 MG/DL (ref 70–110)
HCT VFR BLD AUTO: 34.9 % (ref 40–54)
HGB BLD-MCNC: 11.3 G/DL (ref 14–18)
IMM GRANULOCYTES # BLD AUTO: 0.04 K/UL (ref 0–0.04)
IMM GRANULOCYTES NFR BLD AUTO: 0.4 % (ref 0–0.5)
LYMPHOCYTES # BLD AUTO: 1.4 K/UL (ref 1–4.8)
LYMPHOCYTES NFR BLD: 14.7 % (ref 18–48)
MCH RBC QN AUTO: 26.5 PG (ref 27–31)
MCHC RBC AUTO-ENTMCNC: 32.4 G/DL (ref 32–36)
MCV RBC AUTO: 82 FL (ref 82–98)
MONOCYTES # BLD AUTO: 0.9 K/UL (ref 0.3–1)
MONOCYTES NFR BLD: 9.6 % (ref 4–15)
NEUTROPHILS # BLD AUTO: 6.8 K/UL (ref 1.8–7.7)
NEUTROPHILS NFR BLD: 73.4 % (ref 38–73)
NRBC BLD-RTO: 0 /100 WBC
PLATELET # BLD AUTO: 287 K/UL (ref 150–450)
PMV BLD AUTO: 9.4 FL (ref 9.2–12.9)
POCT GLUCOSE: 118 MG/DL (ref 70–110)
POCT GLUCOSE: 124 MG/DL (ref 70–110)
POTASSIUM SERPL-SCNC: 3.7 MMOL/L (ref 3.5–5.1)
PROT SERPL-MCNC: 6.7 G/DL (ref 6–8.4)
RBC # BLD AUTO: 4.27 M/UL (ref 4.6–6.2)
SODIUM SERPL-SCNC: 137 MMOL/L (ref 136–145)
WBC # BLD AUTO: 9.24 K/UL (ref 3.9–12.7)

## 2021-08-08 PROCEDURE — 37799 UNLISTED PX VASCULAR SURGERY: CPT

## 2021-08-08 PROCEDURE — 25000003 PHARM REV CODE 250: Performed by: INTERNAL MEDICINE

## 2021-08-08 PROCEDURE — 82803 BLOOD GASES ANY COMBINATION: CPT

## 2021-08-08 PROCEDURE — 99900035 HC TECH TIME PER 15 MIN (STAT)

## 2021-08-08 PROCEDURE — 99232 SBSQ HOSP IP/OBS MODERATE 35: CPT | Mod: ,,, | Performed by: INTERNAL MEDICINE

## 2021-08-08 PROCEDURE — 99232 PR SUBSEQUENT HOSPITAL CARE,LEVL II: ICD-10-PCS | Mod: ,,, | Performed by: INTERNAL MEDICINE

## 2021-08-08 PROCEDURE — 36415 COLL VENOUS BLD VENIPUNCTURE: CPT | Performed by: INTERNAL MEDICINE

## 2021-08-08 PROCEDURE — 20600001 HC STEP DOWN PRIVATE ROOM

## 2021-08-08 PROCEDURE — 85025 COMPLETE CBC W/AUTO DIFF WBC: CPT | Performed by: INTERNAL MEDICINE

## 2021-08-08 PROCEDURE — 25000003 PHARM REV CODE 250: Performed by: STUDENT IN AN ORGANIZED HEALTH CARE EDUCATION/TRAINING PROGRAM

## 2021-08-08 PROCEDURE — 80053 COMPREHEN METABOLIC PANEL: CPT | Performed by: INTERNAL MEDICINE

## 2021-08-08 RX ORDER — TAMSULOSIN HYDROCHLORIDE 0.4 MG/1
0.8 CAPSULE ORAL NIGHTLY
Status: DISCONTINUED | OUTPATIENT
Start: 2021-08-08 | End: 2021-08-10 | Stop reason: HOSPADM

## 2021-08-08 RX ADMIN — MUPIROCIN: 20 OINTMENT TOPICAL at 09:08

## 2021-08-08 RX ADMIN — ATORVASTATIN CALCIUM 80 MG: 20 TABLET, FILM COATED ORAL at 09:08

## 2021-08-08 RX ADMIN — CLOPIDOGREL 75 MG: 75 TABLET, FILM COATED ORAL at 09:08

## 2021-08-08 RX ADMIN — ACETAMINOPHEN 650 MG: 325 TABLET ORAL at 06:08

## 2021-08-08 RX ADMIN — CARVEDILOL 12.5 MG: 12.5 TABLET, FILM COATED ORAL at 09:08

## 2021-08-08 RX ADMIN — DOCUSATE SODIUM 100 MG: 100 CAPSULE, LIQUID FILLED ORAL at 09:08

## 2021-08-08 RX ADMIN — HYDRALAZINE HYDROCHLORIDE 100 MG: 50 TABLET ORAL at 05:08

## 2021-08-08 RX ADMIN — APIXABAN 10 MG: 5 TABLET, FILM COATED ORAL at 09:08

## 2021-08-08 RX ADMIN — AMLODIPINE BESYLATE 10 MG: 10 TABLET ORAL at 09:08

## 2021-08-08 RX ADMIN — ACETAMINOPHEN 650 MG: 325 TABLET ORAL at 01:08

## 2021-08-08 RX ADMIN — HYDRALAZINE HYDROCHLORIDE 100 MG: 50 TABLET ORAL at 02:08

## 2021-08-08 RX ADMIN — LOSARTAN POTASSIUM 25 MG: 25 TABLET, FILM COATED ORAL at 09:08

## 2021-08-08 RX ADMIN — HYDRALAZINE HYDROCHLORIDE 100 MG: 50 TABLET ORAL at 09:08

## 2021-08-08 RX ADMIN — TAMSULOSIN HYDROCHLORIDE 0.8 MG: 0.4 CAPSULE ORAL at 09:08

## 2021-08-09 PROBLEM — R29.818 SUSPECTED SLEEP APNEA: Status: ACTIVE | Noted: 2021-08-09

## 2021-08-09 LAB
ALBUMIN SERPL BCP-MCNC: 2.9 G/DL (ref 3.5–5.2)
ALP SERPL-CCNC: 81 U/L (ref 55–135)
ALT SERPL W/O P-5'-P-CCNC: 14 U/L (ref 10–44)
ANION GAP SERPL CALC-SCNC: 12 MMOL/L (ref 8–16)
AST SERPL-CCNC: 14 U/L (ref 10–40)
BASOPHILS # BLD AUTO: 0.03 K/UL (ref 0–0.2)
BASOPHILS NFR BLD: 0.3 % (ref 0–1.9)
BILIRUB SERPL-MCNC: 0.9 MG/DL (ref 0.1–1)
BUN SERPL-MCNC: 22 MG/DL (ref 6–20)
CALCIUM SERPL-MCNC: 9.3 MG/DL (ref 8.7–10.5)
CHLORIDE SERPL-SCNC: 102 MMOL/L (ref 95–110)
CO2 SERPL-SCNC: 20 MMOL/L (ref 23–29)
CREAT SERPL-MCNC: 1 MG/DL (ref 0.5–1.4)
DIFFERENTIAL METHOD: ABNORMAL
EOSINOPHIL # BLD AUTO: 0.2 K/UL (ref 0–0.5)
EOSINOPHIL NFR BLD: 1.8 % (ref 0–8)
ERYTHROCYTE [DISTWIDTH] IN BLOOD BY AUTOMATED COUNT: 13.7 % (ref 11.5–14.5)
EST. GFR  (AFRICAN AMERICAN): >60 ML/MIN/1.73 M^2
EST. GFR  (NON AFRICAN AMERICAN): >60 ML/MIN/1.73 M^2
GLUCOSE SERPL-MCNC: 111 MG/DL (ref 70–110)
HCT VFR BLD AUTO: 37.3 % (ref 40–54)
HGB BLD-MCNC: 11.7 G/DL (ref 14–18)
IMM GRANULOCYTES # BLD AUTO: 0.04 K/UL (ref 0–0.04)
IMM GRANULOCYTES NFR BLD AUTO: 0.4 % (ref 0–0.5)
LYMPHOCYTES # BLD AUTO: 1.2 K/UL (ref 1–4.8)
LYMPHOCYTES NFR BLD: 13.1 % (ref 18–48)
MCH RBC QN AUTO: 26.6 PG (ref 27–31)
MCHC RBC AUTO-ENTMCNC: 31.4 G/DL (ref 32–36)
MCV RBC AUTO: 85 FL (ref 82–98)
MONOCYTES # BLD AUTO: 0.9 K/UL (ref 0.3–1)
MONOCYTES NFR BLD: 9.1 % (ref 4–15)
NEUTROPHILS # BLD AUTO: 7 K/UL (ref 1.8–7.7)
NEUTROPHILS NFR BLD: 75.3 % (ref 38–73)
NRBC BLD-RTO: 0 /100 WBC
PLATELET # BLD AUTO: 298 K/UL (ref 150–450)
PMV BLD AUTO: 9.5 FL (ref 9.2–12.9)
POTASSIUM SERPL-SCNC: 3.7 MMOL/L (ref 3.5–5.1)
PROT SERPL-MCNC: 6.7 G/DL (ref 6–8.4)
RBC # BLD AUTO: 4.4 M/UL (ref 4.6–6.2)
SODIUM SERPL-SCNC: 134 MMOL/L (ref 136–145)
WBC # BLD AUTO: 9.3 K/UL (ref 3.9–12.7)

## 2021-08-09 PROCEDURE — 99233 SBSQ HOSP IP/OBS HIGH 50: CPT | Mod: ,,, | Performed by: INTERNAL MEDICINE

## 2021-08-09 PROCEDURE — 36415 COLL VENOUS BLD VENIPUNCTURE: CPT | Performed by: INTERNAL MEDICINE

## 2021-08-09 PROCEDURE — 25000003 PHARM REV CODE 250: Performed by: INTERNAL MEDICINE

## 2021-08-09 PROCEDURE — 25000003 PHARM REV CODE 250: Performed by: STUDENT IN AN ORGANIZED HEALTH CARE EDUCATION/TRAINING PROGRAM

## 2021-08-09 PROCEDURE — 99233 PR SUBSEQUENT HOSPITAL CARE,LEVL III: ICD-10-PCS | Mod: ,,, | Performed by: INTERNAL MEDICINE

## 2021-08-09 PROCEDURE — 97530 THERAPEUTIC ACTIVITIES: CPT

## 2021-08-09 PROCEDURE — 85025 COMPLETE CBC W/AUTO DIFF WBC: CPT | Performed by: INTERNAL MEDICINE

## 2021-08-09 PROCEDURE — 20600001 HC STEP DOWN PRIVATE ROOM

## 2021-08-09 PROCEDURE — 80053 COMPREHEN METABOLIC PANEL: CPT | Performed by: INTERNAL MEDICINE

## 2021-08-09 PROCEDURE — 97110 THERAPEUTIC EXERCISES: CPT | Mod: CQ

## 2021-08-09 PROCEDURE — 99232 SBSQ HOSP IP/OBS MODERATE 35: CPT | Mod: ,,, | Performed by: NURSE PRACTITIONER

## 2021-08-09 PROCEDURE — 97530 THERAPEUTIC ACTIVITIES: CPT | Mod: CQ

## 2021-08-09 PROCEDURE — 97535 SELF CARE MNGMENT TRAINING: CPT

## 2021-08-09 PROCEDURE — 99232 PR SUBSEQUENT HOSPITAL CARE,LEVL II: ICD-10-PCS | Mod: ,,, | Performed by: NURSE PRACTITIONER

## 2021-08-09 RX ORDER — AMOXICILLIN 250 MG
1 CAPSULE ORAL 2 TIMES DAILY
COMMUNITY
End: 2024-01-22

## 2021-08-09 RX ORDER — TAMSULOSIN HYDROCHLORIDE 0.4 MG/1
0.8 CAPSULE ORAL DAILY
Status: ON HOLD | COMMUNITY
End: 2021-08-10 | Stop reason: SDUPTHER

## 2021-08-09 RX ORDER — CYANOCOBALAMIN (VITAMIN B-12) 500 MCG
1 TABLET ORAL NIGHTLY
COMMUNITY
End: 2022-07-19

## 2021-08-09 RX ORDER — ESCITALOPRAM OXALATE 5 MG/1
5 TABLET ORAL DAILY
COMMUNITY
End: 2021-12-03 | Stop reason: SDUPTHER

## 2021-08-09 RX ADMIN — HYDRALAZINE HYDROCHLORIDE 100 MG: 50 TABLET ORAL at 05:08

## 2021-08-09 RX ADMIN — APIXABAN 10 MG: 5 TABLET, FILM COATED ORAL at 09:08

## 2021-08-09 RX ADMIN — ATORVASTATIN CALCIUM 80 MG: 20 TABLET, FILM COATED ORAL at 11:08

## 2021-08-09 RX ADMIN — CARVEDILOL 12.5 MG: 12.5 TABLET, FILM COATED ORAL at 09:08

## 2021-08-09 RX ADMIN — TAMSULOSIN HYDROCHLORIDE 0.8 MG: 0.4 CAPSULE ORAL at 09:08

## 2021-08-09 RX ADMIN — POTASSIUM BICARBONATE 40 MEQ: 391 TABLET, EFFERVESCENT ORAL at 11:08

## 2021-08-09 RX ADMIN — MUPIROCIN: 20 OINTMENT TOPICAL at 09:08

## 2021-08-09 RX ADMIN — CLOPIDOGREL 75 MG: 75 TABLET, FILM COATED ORAL at 11:08

## 2021-08-09 RX ADMIN — APIXABAN 10 MG: 5 TABLET, FILM COATED ORAL at 11:08

## 2021-08-09 RX ADMIN — ACETAMINOPHEN 650 MG: 325 TABLET ORAL at 09:08

## 2021-08-09 RX ADMIN — LOSARTAN POTASSIUM 25 MG: 25 TABLET, FILM COATED ORAL at 11:08

## 2021-08-09 RX ADMIN — DOCUSATE SODIUM 100 MG: 100 CAPSULE, LIQUID FILLED ORAL at 11:08

## 2021-08-09 RX ADMIN — HYDRALAZINE HYDROCHLORIDE 100 MG: 50 TABLET ORAL at 09:08

## 2021-08-09 RX ADMIN — CARVEDILOL 12.5 MG: 12.5 TABLET, FILM COATED ORAL at 11:08

## 2021-08-09 RX ADMIN — HYDRALAZINE HYDROCHLORIDE 100 MG: 50 TABLET ORAL at 02:08

## 2021-08-09 RX ADMIN — AMLODIPINE BESYLATE 10 MG: 10 TABLET ORAL at 11:08

## 2021-08-10 VITALS
SYSTOLIC BLOOD PRESSURE: 120 MMHG | BODY MASS INDEX: 30.04 KG/M2 | TEMPERATURE: 98 F | OXYGEN SATURATION: 95 % | HEART RATE: 102 BPM | HEIGHT: 67 IN | WEIGHT: 191.38 LBS | DIASTOLIC BLOOD PRESSURE: 85 MMHG | RESPIRATION RATE: 18 BRPM

## 2021-08-10 LAB
ALBUMIN SERPL BCP-MCNC: 2.8 G/DL (ref 3.5–5.2)
ALP SERPL-CCNC: 82 U/L (ref 55–135)
ALT SERPL W/O P-5'-P-CCNC: 18 U/L (ref 10–44)
ANION GAP SERPL CALC-SCNC: 10 MMOL/L (ref 8–16)
AST SERPL-CCNC: 18 U/L (ref 10–40)
BASOPHILS # BLD AUTO: 0.03 K/UL (ref 0–0.2)
BASOPHILS NFR BLD: 0.2 % (ref 0–1.9)
BILIRUB SERPL-MCNC: 1 MG/DL (ref 0.1–1)
BUN SERPL-MCNC: 21 MG/DL (ref 6–20)
CALCIUM SERPL-MCNC: 9.5 MG/DL (ref 8.7–10.5)
CHLORIDE SERPL-SCNC: 102 MMOL/L (ref 95–110)
CO2 SERPL-SCNC: 22 MMOL/L (ref 23–29)
CREAT SERPL-MCNC: 1 MG/DL (ref 0.5–1.4)
DIFFERENTIAL METHOD: ABNORMAL
EOSINOPHIL # BLD AUTO: 0.1 K/UL (ref 0–0.5)
EOSINOPHIL NFR BLD: 0.6 % (ref 0–8)
ERYTHROCYTE [DISTWIDTH] IN BLOOD BY AUTOMATED COUNT: 13.8 % (ref 11.5–14.5)
EST. GFR  (AFRICAN AMERICAN): >60 ML/MIN/1.73 M^2
EST. GFR  (NON AFRICAN AMERICAN): >60 ML/MIN/1.73 M^2
GLUCOSE SERPL-MCNC: 136 MG/DL (ref 70–110)
HCT VFR BLD AUTO: 35.9 % (ref 40–54)
HGB BLD-MCNC: 11.6 G/DL (ref 14–18)
IMM GRANULOCYTES # BLD AUTO: 0.06 K/UL (ref 0–0.04)
IMM GRANULOCYTES NFR BLD AUTO: 0.4 % (ref 0–0.5)
LYMPHOCYTES # BLD AUTO: 1.3 K/UL (ref 1–4.8)
LYMPHOCYTES NFR BLD: 9.2 % (ref 18–48)
MCH RBC QN AUTO: 26.2 PG (ref 27–31)
MCHC RBC AUTO-ENTMCNC: 32.3 G/DL (ref 32–36)
MCV RBC AUTO: 81 FL (ref 82–98)
MONOCYTES # BLD AUTO: 1.2 K/UL (ref 0.3–1)
MONOCYTES NFR BLD: 8.7 % (ref 4–15)
NEUTROPHILS # BLD AUTO: 11.1 K/UL (ref 1.8–7.7)
NEUTROPHILS NFR BLD: 80.9 % (ref 38–73)
NRBC BLD-RTO: 0 /100 WBC
PLATELET # BLD AUTO: 321 K/UL (ref 150–450)
PMV BLD AUTO: 9.4 FL (ref 9.2–12.9)
POCT GLUCOSE: 153 MG/DL (ref 70–110)
POTASSIUM SERPL-SCNC: 3.5 MMOL/L (ref 3.5–5.1)
PROT SERPL-MCNC: 7 G/DL (ref 6–8.4)
RBC # BLD AUTO: 4.43 M/UL (ref 4.6–6.2)
SODIUM SERPL-SCNC: 134 MMOL/L (ref 136–145)
WBC # BLD AUTO: 13.7 K/UL (ref 3.9–12.7)

## 2021-08-10 PROCEDURE — 25000003 PHARM REV CODE 250: Performed by: INTERNAL MEDICINE

## 2021-08-10 PROCEDURE — 97530 THERAPEUTIC ACTIVITIES: CPT

## 2021-08-10 PROCEDURE — 25000003 PHARM REV CODE 250: Performed by: STUDENT IN AN ORGANIZED HEALTH CARE EDUCATION/TRAINING PROGRAM

## 2021-08-10 PROCEDURE — 36415 COLL VENOUS BLD VENIPUNCTURE: CPT | Performed by: INTERNAL MEDICINE

## 2021-08-10 PROCEDURE — 99238 HOSP IP/OBS DSCHRG MGMT 30/<: CPT | Mod: ,,, | Performed by: INTERNAL MEDICINE

## 2021-08-10 PROCEDURE — 97110 THERAPEUTIC EXERCISES: CPT

## 2021-08-10 PROCEDURE — 99238 PR HOSPITAL DISCHARGE DAY,<30 MIN: ICD-10-PCS | Mod: ,,, | Performed by: INTERNAL MEDICINE

## 2021-08-10 PROCEDURE — 85025 COMPLETE CBC W/AUTO DIFF WBC: CPT | Performed by: INTERNAL MEDICINE

## 2021-08-10 PROCEDURE — 97535 SELF CARE MNGMENT TRAINING: CPT

## 2021-08-10 PROCEDURE — 80053 COMPREHEN METABOLIC PANEL: CPT | Performed by: INTERNAL MEDICINE

## 2021-08-10 PROCEDURE — 97112 NEUROMUSCULAR REEDUCATION: CPT

## 2021-08-10 RX ORDER — TAMSULOSIN HYDROCHLORIDE 0.4 MG/1
0.8 CAPSULE ORAL DAILY
Qty: 90 CAPSULE | Refills: 4 | Status: SHIPPED | OUTPATIENT
Start: 2021-08-10 | End: 2021-12-03 | Stop reason: SDUPTHER

## 2021-08-10 RX ORDER — LOSARTAN POTASSIUM 25 MG/1
25 TABLET ORAL DAILY
Qty: 90 TABLET | Refills: 3 | Status: SHIPPED | OUTPATIENT
Start: 2021-08-10 | End: 2021-11-16 | Stop reason: SDUPTHER

## 2021-08-10 RX ADMIN — APIXABAN 10 MG: 5 TABLET, FILM COATED ORAL at 10:08

## 2021-08-10 RX ADMIN — ATORVASTATIN CALCIUM 80 MG: 20 TABLET, FILM COATED ORAL at 10:08

## 2021-08-10 RX ADMIN — DOCUSATE SODIUM 100 MG: 100 CAPSULE, LIQUID FILLED ORAL at 10:08

## 2021-08-10 RX ADMIN — LOSARTAN POTASSIUM 25 MG: 25 TABLET, FILM COATED ORAL at 10:08

## 2021-08-10 RX ADMIN — AMLODIPINE BESYLATE 10 MG: 10 TABLET ORAL at 10:08

## 2021-08-10 RX ADMIN — CLOPIDOGREL 75 MG: 75 TABLET, FILM COATED ORAL at 10:08

## 2021-08-10 RX ADMIN — HYDRALAZINE HYDROCHLORIDE 100 MG: 50 TABLET ORAL at 02:08

## 2021-08-10 RX ADMIN — CARVEDILOL 12.5 MG: 12.5 TABLET, FILM COATED ORAL at 10:08

## 2021-08-10 RX ADMIN — HYDRALAZINE HYDROCHLORIDE 100 MG: 50 TABLET ORAL at 05:08

## 2021-08-16 ENCOUNTER — TELEPHONE (OUTPATIENT)
Dept: NEUROLOGY | Facility: HOSPITAL | Age: 56
End: 2021-08-16

## 2021-08-16 ENCOUNTER — TELEPHONE (OUTPATIENT)
Dept: ADMINISTRATIVE | Facility: HOSPITAL | Age: 56
End: 2021-08-16

## 2021-08-23 ENCOUNTER — PATIENT OUTREACH (OUTPATIENT)
Dept: ADMINISTRATIVE | Facility: HOSPITAL | Age: 56
End: 2021-08-23

## 2021-08-23 DIAGNOSIS — R40.0 DAYTIME SLEEPINESS: Primary | ICD-10-CM

## 2021-09-07 ENCOUNTER — TELEPHONE (OUTPATIENT)
Dept: ADMINISTRATIVE | Facility: OTHER | Age: 56
End: 2021-09-07

## 2021-09-10 ENCOUNTER — TELEPHONE (OUTPATIENT)
Dept: PHYSICAL MEDICINE AND REHAB | Facility: CLINIC | Age: 56
End: 2021-09-10

## 2021-09-10 ENCOUNTER — TELEPHONE (OUTPATIENT)
Dept: INTERNAL MEDICINE | Facility: CLINIC | Age: 56
End: 2021-09-10

## 2021-09-20 ENCOUNTER — LAB VISIT (OUTPATIENT)
Dept: LAB | Facility: OTHER | Age: 56
End: 2021-09-20
Attending: FAMILY MEDICINE
Payer: MEDICAID

## 2021-09-20 ENCOUNTER — OFFICE VISIT (OUTPATIENT)
Dept: INTERNAL MEDICINE | Facility: CLINIC | Age: 56
End: 2021-09-20
Attending: FAMILY MEDICINE
Payer: MEDICAID

## 2021-09-20 VITALS
HEART RATE: 69 BPM | SYSTOLIC BLOOD PRESSURE: 100 MMHG | DIASTOLIC BLOOD PRESSURE: 58 MMHG | OXYGEN SATURATION: 99 % | BODY MASS INDEX: 28.04 KG/M2 | WEIGHT: 179 LBS

## 2021-09-20 DIAGNOSIS — N18.30 TYPE 2 DIABETES MELLITUS WITH STAGE 3 CHRONIC KIDNEY DISEASE, WITHOUT LONG-TERM CURRENT USE OF INSULIN, UNSPECIFIED WHETHER STAGE 3A OR 3B CKD: ICD-10-CM

## 2021-09-20 DIAGNOSIS — I26.99 BILATERAL PULMONARY EMBOLISM: ICD-10-CM

## 2021-09-20 DIAGNOSIS — E11.22 TYPE 2 DIABETES MELLITUS WITH STAGE 3 CHRONIC KIDNEY DISEASE, WITHOUT LONG-TERM CURRENT USE OF INSULIN, UNSPECIFIED WHETHER STAGE 3A OR 3B CKD: ICD-10-CM

## 2021-09-20 DIAGNOSIS — I26.09 OTHER ACUTE PULMONARY EMBOLISM WITH ACUTE COR PULMONALE: Primary | ICD-10-CM

## 2021-09-20 DIAGNOSIS — I10 ESSENTIAL HYPERTENSION: ICD-10-CM

## 2021-09-20 DIAGNOSIS — I63.9 ACUTE ISCHEMIC STROKE: ICD-10-CM

## 2021-09-20 DIAGNOSIS — N18.30 STAGE 3 CHRONIC KIDNEY DISEASE, UNSPECIFIED WHETHER STAGE 3A OR 3B CKD: ICD-10-CM

## 2021-09-20 LAB
ESTIMATED AVG GLUCOSE: 117 MG/DL (ref 68–131)
HBA1C MFR BLD: 5.7 % (ref 4–5.6)

## 2021-09-20 PROCEDURE — 99999 PR PBB SHADOW E&M-EST. PATIENT-LVL III: ICD-10-PCS | Mod: PBBFAC,,, | Performed by: FAMILY MEDICINE

## 2021-09-20 PROCEDURE — 83036 HEMOGLOBIN GLYCOSYLATED A1C: CPT | Performed by: FAMILY MEDICINE

## 2021-09-20 PROCEDURE — 99214 PR OFFICE/OUTPT VISIT, EST, LEVL IV, 30-39 MIN: ICD-10-PCS | Mod: S$PBB,,, | Performed by: FAMILY MEDICINE

## 2021-09-20 PROCEDURE — 99213 OFFICE O/P EST LOW 20 MIN: CPT | Mod: PBBFAC | Performed by: FAMILY MEDICINE

## 2021-09-20 PROCEDURE — 36415 COLL VENOUS BLD VENIPUNCTURE: CPT | Performed by: FAMILY MEDICINE

## 2021-09-20 PROCEDURE — 99999 PR PBB SHADOW E&M-EST. PATIENT-LVL III: CPT | Mod: PBBFAC,,, | Performed by: FAMILY MEDICINE

## 2021-09-20 PROCEDURE — 99214 OFFICE O/P EST MOD 30 MIN: CPT | Mod: S$PBB,,, | Performed by: FAMILY MEDICINE

## 2021-09-20 RX ORDER — PANTOPRAZOLE SODIUM 40 MG/1
40 TABLET, DELAYED RELEASE ORAL DAILY
COMMUNITY
Start: 2021-09-07 | End: 2021-12-03 | Stop reason: SDUPTHER

## 2021-09-20 RX ORDER — NAPROXEN SODIUM 220 MG/1
81 TABLET, FILM COATED ORAL DAILY
COMMUNITY
Start: 2021-09-08 | End: 2022-06-16

## 2021-09-20 RX ORDER — GABAPENTIN 100 MG/1
CAPSULE ORAL NIGHTLY
COMMUNITY
Start: 2021-09-07 | End: 2021-09-20

## 2021-09-24 PROBLEM — I63.9 DYSARTHRIA DUE TO ACUTE CEREBROVASCULAR ACCIDENT (CVA): Status: ACTIVE | Noted: 2021-09-24

## 2021-09-24 PROBLEM — R47.1 DYSARTHRIA DUE TO ACUTE CEREBROVASCULAR ACCIDENT (CVA): Status: ACTIVE | Noted: 2021-09-24

## 2021-09-30 ENCOUNTER — TELEPHONE (OUTPATIENT)
Dept: INTERNAL MEDICINE | Facility: CLINIC | Age: 56
End: 2021-09-30

## 2021-10-05 ENCOUNTER — PATIENT MESSAGE (OUTPATIENT)
Dept: ADMINISTRATIVE | Facility: HOSPITAL | Age: 56
End: 2021-10-05

## 2021-10-11 DIAGNOSIS — I26.99 BILATERAL PULMONARY EMBOLISM: ICD-10-CM

## 2021-10-11 DIAGNOSIS — I63.9 ACUTE ISCHEMIC STROKE: Primary | ICD-10-CM

## 2021-10-11 DIAGNOSIS — I10 ESSENTIAL HYPERTENSION: ICD-10-CM

## 2021-10-21 ENCOUNTER — TELEPHONE (OUTPATIENT)
Dept: INTERNAL MEDICINE | Facility: CLINIC | Age: 56
End: 2021-10-21

## 2021-10-22 ENCOUNTER — PATIENT MESSAGE (OUTPATIENT)
Dept: INTERNAL MEDICINE | Facility: CLINIC | Age: 56
End: 2021-10-22
Payer: MEDICAID

## 2021-10-25 ENCOUNTER — TELEPHONE (OUTPATIENT)
Dept: INTERNAL MEDICINE | Facility: CLINIC | Age: 56
End: 2021-10-25
Payer: MEDICAID

## 2021-10-26 ENCOUNTER — TELEPHONE (OUTPATIENT)
Dept: INTERNAL MEDICINE | Facility: CLINIC | Age: 56
End: 2021-10-26

## 2021-10-26 ENCOUNTER — CLINICAL SUPPORT (OUTPATIENT)
Dept: INTERNAL MEDICINE | Facility: CLINIC | Age: 56
End: 2021-10-26
Payer: MEDICAID

## 2021-10-26 VITALS — HEART RATE: 63 BPM | DIASTOLIC BLOOD PRESSURE: 90 MMHG | OXYGEN SATURATION: 99 % | SYSTOLIC BLOOD PRESSURE: 134 MMHG

## 2021-10-26 PROCEDURE — 99999 PR PBB SHADOW E&M-EST. PATIENT-LVL II: ICD-10-PCS | Mod: PBBFAC,,,

## 2021-10-26 PROCEDURE — 99212 OFFICE O/P EST SF 10 MIN: CPT | Mod: PBBFAC

## 2021-10-26 PROCEDURE — 99999 PR PBB SHADOW E&M-EST. PATIENT-LVL II: CPT | Mod: PBBFAC,,,

## 2021-10-27 ENCOUNTER — PATIENT MESSAGE (OUTPATIENT)
Dept: INTERNAL MEDICINE | Facility: CLINIC | Age: 56
End: 2021-10-27
Payer: MEDICAID

## 2021-10-28 ENCOUNTER — PATIENT MESSAGE (OUTPATIENT)
Dept: ADMINISTRATIVE | Facility: OTHER | Age: 56
End: 2021-10-28
Payer: MEDICAID

## 2021-11-16 DIAGNOSIS — K59.00 CONSTIPATION, UNSPECIFIED CONSTIPATION TYPE: ICD-10-CM

## 2021-11-16 DIAGNOSIS — I10 ESSENTIAL HYPERTENSION: Primary | ICD-10-CM

## 2021-11-16 RX ORDER — LOSARTAN POTASSIUM 25 MG/1
25 TABLET ORAL DAILY
Qty: 90 TABLET | Refills: 3 | Status: SHIPPED | OUTPATIENT
Start: 2021-11-16 | End: 2021-11-16 | Stop reason: SDUPTHER

## 2021-11-16 RX ORDER — ATORVASTATIN CALCIUM 80 MG/1
80 TABLET, FILM COATED ORAL DAILY
Qty: 30 TABLET | Refills: 1
Start: 2021-11-16 | End: 2021-11-18 | Stop reason: SDUPTHER

## 2021-11-17 DIAGNOSIS — E78.5 DYSLIPIDEMIA: Primary | ICD-10-CM

## 2021-11-17 RX ORDER — LACTULOSE 10 G/15ML
20 SOLUTION ORAL 2 TIMES DAILY PRN
Qty: 600 ML | Refills: 0 | Status: SHIPPED | OUTPATIENT
Start: 2021-11-17 | End: 2022-01-20

## 2021-11-17 RX ORDER — LOSARTAN POTASSIUM 50 MG/1
75 TABLET ORAL DAILY
Qty: 135 TABLET | Refills: 0 | Status: SHIPPED | OUTPATIENT
Start: 2021-11-17 | End: 2022-02-22

## 2021-11-18 DIAGNOSIS — I63.9 ACUTE ISCHEMIC STROKE: Primary | ICD-10-CM

## 2021-11-18 DIAGNOSIS — E78.5 DYSLIPIDEMIA: ICD-10-CM

## 2021-11-19 RX ORDER — ATORVASTATIN CALCIUM 80 MG/1
80 TABLET, FILM COATED ORAL DAILY
Qty: 90 TABLET | Refills: 0 | OUTPATIENT
Start: 2021-11-19 | End: 2022-11-19

## 2021-11-19 RX ORDER — ATORVASTATIN CALCIUM 80 MG/1
80 TABLET, FILM COATED ORAL DAILY
Qty: 30 TABLET | Refills: 1 | Status: SHIPPED | OUTPATIENT
Start: 2021-11-19 | End: 2022-04-19 | Stop reason: SDUPTHER

## 2021-12-02 ENCOUNTER — TELEPHONE (OUTPATIENT)
Dept: INTERNAL MEDICINE | Facility: CLINIC | Age: 56
End: 2021-12-02
Payer: MEDICAID

## 2021-12-03 ENCOUNTER — TELEPHONE (OUTPATIENT)
Dept: INTERNAL MEDICINE | Facility: CLINIC | Age: 56
End: 2021-12-03
Payer: MEDICAID

## 2021-12-03 DIAGNOSIS — I26.99 BILATERAL PULMONARY EMBOLISM: ICD-10-CM

## 2021-12-03 DIAGNOSIS — I10 ESSENTIAL HYPERTENSION: Primary | ICD-10-CM

## 2021-12-03 DIAGNOSIS — N18.30 STAGE 3 CHRONIC KIDNEY DISEASE, UNSPECIFIED WHETHER STAGE 3A OR 3B CKD: ICD-10-CM

## 2021-12-03 RX ORDER — CARVEDILOL 6.25 MG/1
6.25 TABLET ORAL 2 TIMES DAILY WITH MEALS
Qty: 60 TABLET | Refills: 0 | Status: SHIPPED | OUTPATIENT
Start: 2021-12-03 | End: 2022-01-03

## 2021-12-03 RX ORDER — ESCITALOPRAM OXALATE 5 MG/1
5 TABLET ORAL DAILY
Qty: 30 TABLET | Refills: 1 | Status: CANCELLED | OUTPATIENT
Start: 2021-12-03

## 2021-12-03 RX ORDER — ESCITALOPRAM OXALATE 5 MG/1
5 TABLET ORAL DAILY
Qty: 30 TABLET | Refills: 1 | Status: SHIPPED | OUTPATIENT
Start: 2021-12-03 | End: 2022-02-02

## 2021-12-03 RX ORDER — TAMSULOSIN HYDROCHLORIDE 0.4 MG/1
0.8 CAPSULE ORAL DAILY
Qty: 30 CAPSULE | Refills: 1 | Status: SHIPPED | OUTPATIENT
Start: 2021-12-03 | End: 2022-01-03

## 2021-12-03 RX ORDER — AMLODIPINE BESYLATE 10 MG/1
10 TABLET ORAL DAILY
Qty: 30 TABLET | Refills: 1 | Status: SHIPPED | OUTPATIENT
Start: 2021-12-03 | End: 2022-09-06 | Stop reason: SDUPTHER

## 2021-12-03 RX ORDER — CLOPIDOGREL BISULFATE 75 MG/1
75 TABLET ORAL DAILY
Qty: 30 TABLET | Refills: 1 | Status: SHIPPED | OUTPATIENT
Start: 2021-12-03 | End: 2022-04-21

## 2021-12-03 RX ORDER — PANTOPRAZOLE SODIUM 40 MG/1
40 TABLET, DELAYED RELEASE ORAL DAILY
Qty: 30 TABLET | Refills: 1 | Status: SHIPPED | OUTPATIENT
Start: 2021-12-03 | End: 2022-02-02

## 2021-12-09 ENCOUNTER — PATIENT OUTREACH (OUTPATIENT)
Dept: ADMINISTRATIVE | Facility: HOSPITAL | Age: 56
End: 2021-12-09
Payer: MEDICAID

## 2022-01-11 ENCOUNTER — PATIENT MESSAGE (OUTPATIENT)
Dept: OTHER | Facility: OTHER | Age: 57
End: 2022-01-11
Payer: MEDICAID

## 2022-01-13 ENCOUNTER — TELEPHONE (OUTPATIENT)
Dept: INTERNAL MEDICINE | Facility: CLINIC | Age: 57
End: 2022-01-13
Payer: MEDICAID

## 2022-01-13 NOTE — TELEPHONE ENCOUNTER
----- Message from Mariam Contreras sent at 1/13/2022 12:22 PM CST -----  Type:  Needs Medical Advice/Symptom-based Call    Who Called:  Self     Symptoms (please be specific):  constipation     How long has patient had these symptoms:   every week since stroke last year, patient says he has been taking Ducolax but it's not really helping anymore. Please call     Would the patient rather a call back or a response via [facility]? Call     Best Call Back Number: .[phone] (home)

## 2022-01-13 NOTE — TELEPHONE ENCOUNTER
Returned pt's call.  Spoke with pt's wife Corry who states pt would like to be seen for ongoing constipation x 1 year intermittently. He had appt with PCP in Nov, but it was canceled by provider and has not been rescheduled yet.    Pt can only have BM after medication and then has blood noted at times - denied large amount of blood or any regularity with bleeding.    Made appt for next week with PCP as per preference, will call back if they need a sooner appt.    Given emergency prompts and stated understanding.

## 2022-01-20 ENCOUNTER — OFFICE VISIT (OUTPATIENT)
Dept: INTERNAL MEDICINE | Facility: CLINIC | Age: 57
End: 2022-01-20
Attending: FAMILY MEDICINE
Payer: MEDICAID

## 2022-01-20 VITALS
SYSTOLIC BLOOD PRESSURE: 124 MMHG | OXYGEN SATURATION: 97 % | BODY MASS INDEX: 29.74 KG/M2 | HEART RATE: 78 BPM | DIASTOLIC BLOOD PRESSURE: 82 MMHG | WEIGHT: 189.5 LBS | HEIGHT: 67 IN

## 2022-01-20 DIAGNOSIS — K59.00 CONSTIPATION, UNSPECIFIED CONSTIPATION TYPE: ICD-10-CM

## 2022-01-20 DIAGNOSIS — K59.01 SLOW TRANSIT CONSTIPATION: Primary | ICD-10-CM

## 2022-01-20 DIAGNOSIS — I63.40 CEREBROVASCULAR ACCIDENT (CVA) DUE TO EMBOLISM OF CEREBRAL ARTERY: ICD-10-CM

## 2022-01-20 DIAGNOSIS — R47.1 DYSARTHRIA: ICD-10-CM

## 2022-01-20 DIAGNOSIS — I26.99 BILATERAL PULMONARY EMBOLISM: ICD-10-CM

## 2022-01-20 PROCEDURE — 99213 OFFICE O/P EST LOW 20 MIN: CPT | Mod: PBBFAC | Performed by: FAMILY MEDICINE

## 2022-01-20 PROCEDURE — 3074F SYST BP LT 130 MM HG: CPT | Mod: CPTII,,, | Performed by: FAMILY MEDICINE

## 2022-01-20 PROCEDURE — 99214 OFFICE O/P EST MOD 30 MIN: CPT | Mod: S$PBB,,, | Performed by: FAMILY MEDICINE

## 2022-01-20 PROCEDURE — 1160F PR REVIEW ALL MEDS BY PRESCRIBER/CLIN PHARMACIST DOCUMENTED: ICD-10-PCS | Mod: CPTII,,, | Performed by: FAMILY MEDICINE

## 2022-01-20 PROCEDURE — 3074F PR MOST RECENT SYSTOLIC BLOOD PRESSURE < 130 MM HG: ICD-10-PCS | Mod: CPTII,,, | Performed by: FAMILY MEDICINE

## 2022-01-20 PROCEDURE — 1160F RVW MEDS BY RX/DR IN RCRD: CPT | Mod: CPTII,,, | Performed by: FAMILY MEDICINE

## 2022-01-20 PROCEDURE — 3079F DIAST BP 80-89 MM HG: CPT | Mod: CPTII,,, | Performed by: FAMILY MEDICINE

## 2022-01-20 PROCEDURE — 99999 PR PBB SHADOW E&M-EST. PATIENT-LVL III: CPT | Mod: PBBFAC,,, | Performed by: FAMILY MEDICINE

## 2022-01-20 PROCEDURE — 99999 PR PBB SHADOW E&M-EST. PATIENT-LVL III: ICD-10-PCS | Mod: PBBFAC,,, | Performed by: FAMILY MEDICINE

## 2022-01-20 PROCEDURE — 3008F PR BODY MASS INDEX (BMI) DOCUMENTED: ICD-10-PCS | Mod: CPTII,,, | Performed by: FAMILY MEDICINE

## 2022-01-20 PROCEDURE — 99214 PR OFFICE/OUTPT VISIT, EST, LEVL IV, 30-39 MIN: ICD-10-PCS | Mod: S$PBB,,, | Performed by: FAMILY MEDICINE

## 2022-01-20 PROCEDURE — 3008F BODY MASS INDEX DOCD: CPT | Mod: CPTII,,, | Performed by: FAMILY MEDICINE

## 2022-01-20 PROCEDURE — 3079F PR MOST RECENT DIASTOLIC BLOOD PRESSURE 80-89 MM HG: ICD-10-PCS | Mod: CPTII,,, | Performed by: FAMILY MEDICINE

## 2022-01-20 PROCEDURE — 1159F MED LIST DOCD IN RCRD: CPT | Mod: CPTII,,, | Performed by: FAMILY MEDICINE

## 2022-01-20 PROCEDURE — 1159F PR MEDICATION LIST DOCUMENTED IN MEDICAL RECORD: ICD-10-PCS | Mod: CPTII,,, | Performed by: FAMILY MEDICINE

## 2022-01-20 RX ORDER — LACTULOSE 10 G/15ML
10 SOLUTION ORAL 3 TIMES DAILY
Qty: 4050 ML | Refills: 3 | Status: SHIPPED | OUTPATIENT
Start: 2022-01-20 | End: 2022-08-24

## 2022-01-20 NOTE — PROGRESS NOTES
CHIEF COMPLAINT:  1 year of constipation and followup for CVA X2 d/t uncontrolled hypertension, diabetes, and hypercholesterolemia.     HISTORY OF PRESENT ILLNESS: The patient is a 56 year-old black male. He was inpt and rehab for total of several months d/t CVA X2 and hyperglycemia.  Ultimately complicated with DVT  and significant filomnea PEs.    He has been having constipation for over a year.  He often goes 5-7 days between bowel movements.  He does take one Jackie Colace twice a day.  This does not seem to help very much.  He does have lactulose which he is not using.  We discussed a complete bowel regimen today.    he has lost over 50 lb in the past couple of months due to his chronic and acute illnesses.     The patient has diabetes.  Recent blood sugars have been less than 200.  There have been no episodes of hypoglycemia.  Patient does routinely monitor their blood sugar.    He is on Lipitor and metformin which he is tolerating well.      He is on amlodipine, Coreg, clonidine and losartan.  Blood pressure is well controlled today.      REVIEW OF SYSTEMS:   GENERAL: No fever, chills, fatigability.   SKIN: No rashes, itching or changes in color or texture of skin.   HEAD: No headaches or recent head trauma.   EYES: Visual acuity fine. No photophobia, ocular pain or diplopia.   EARS: Denies ear pain, discharge.   NOSE: No loss of smell, no epistaxis or postnasal drip.   MOUTH & THROAT: No hoarseness or change in voice. No excessive gum bleeding.   NODES: Denies swollen glands.   CHEST: Denies GUARDADO, cyanosis, wheezing, and sputum production.   CARDIOVASCULAR: Denies chest pain, PND, orthopnea or reduced exercise tolerance.   ABDOMEN: Appetite fine. Denies diarrhea, abdominal pain, hematemesis or blood in stool.   URINARY: No flank pain, dysuria or hematuria.   PERIPHERAL VASCULAR: No claudication or cyanosis.   MUSCULOSKELETAL: No joint stiffness or swelling. Denies back pain.   NEUROLOGIC: No history of seizures,  "paralysis, alteration of gait or coordination.     SOCIAL HISTORY: The patient does not smoke. The patient consumes alcohol socially. The patient is . 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     PHYSICAL EXAMINATION:   Blood pressure 124/82, pulse 78, height 5' 7" (1.702 m), weight 86 kg (189 lb 7.8 oz), SpO2 97 %.    APPEARANCE: Well nourished, well developed, in no acute distress.   HEAD: Normocephalic, atraumatic.   EYES: PERRL. EOMI. Conjunctivae without injection and anicteric   NOSE: Mucosa pink. Airway clear.   MOUTH & THROAT: No tonsillar enlargement. No pharyngeal erythema or exudate. No stridor.   NECK: Supple.   NODES: No cervical, axillary or inguinal lymph node enlargement.   CHEST: Lungs clear to auscultation. No retractions are noted. No rales or rhonchi are present.   CARDIOVASCULAR: Normal S1, S2. No rubs, murmurs or gallops.   ABDOMEN: Bowel sounds normal. Not distended. Soft. No tenderness or masses. No ascites is noted.   MUSCULOSKELETAL: There is no clubbing, cyanosis, or edema of the extremities x4. There is full range of motion of the lumbar spine. There is full range of motion of the extremities x4. There is no deformity noted.   NEUROLOGIC:   Dysarthric speech development.   Hearing normal.   Hemiparetic gait.   Motor shows residual hemiparesis  sensory exam grossly normal.   PSYCHIATRIC: Patient is alert and oriented x3. Thought processes are all normal. There is no homicidality. There is no suicidality. There is no evidence of psychosis.     LABORATORY/RADIOLOGY: Chart reviewed, including notes and discharge summary     ASSESSMENT:   Chronic constipation  Hypertension   Type 2 diabetes, condition is well controlled on current medication regimen   Hyperlipidemia, condition is well controlled on current medication regimen    PLAN:    Lactulose 1 tbsp 1-3 times daily    We will update an A1c soon  Return to clinic in 6 months        "

## 2022-01-30 DIAGNOSIS — N18.30 STAGE 3 CHRONIC KIDNEY DISEASE, UNSPECIFIED WHETHER STAGE 3A OR 3B CKD: ICD-10-CM

## 2022-01-30 NOTE — TELEPHONE ENCOUNTER
No new care gaps identified.  Powered by [employer] by [employer]. Reference number: [id number].   1/30/2022 1:48:05 PM CST

## 2022-02-02 RX ORDER — PANTOPRAZOLE SODIUM 40 MG/1
TABLET, DELAYED RELEASE ORAL
Qty: 90 TABLET | Refills: 3 | Status: SHIPPED | OUTPATIENT
Start: 2022-02-02 | End: 2023-01-29

## 2022-02-02 RX ORDER — ESCITALOPRAM OXALATE 5 MG/1
TABLET ORAL
Qty: 90 TABLET | Refills: 3 | Status: SHIPPED | OUTPATIENT
Start: 2022-02-02 | End: 2022-08-24

## 2022-02-02 RX ORDER — TAMSULOSIN HYDROCHLORIDE 0.4 MG/1
CAPSULE ORAL
Qty: 180 CAPSULE | Refills: 3 | Status: SHIPPED | OUTPATIENT
Start: 2022-02-02 | End: 2022-07-19

## 2022-02-02 NOTE — TELEPHONE ENCOUNTER
Refill Routing Note   Medication(s) are not appropriate for processing by [facility]:    - Required indication for medication not on problem list (GERD/LERD/Abdiel's/Esophigitis/Zollinger Franco Syndrome)           Medication reconciliation completed: No      Automatic Epic Protocol Generated Data:    Requested Prescriptions   Pending Prescriptions Disp Refills    pantoprazole (PROTONIX) 40 MG tablet [Pharmacy Med Name: Pantoprazole Sodium 40 MG Oral Tablet Delayed Release] 90 tablet 3     Sig: Take 1 tablet by mouth once daily       Gastroenterology: Proton Pump Inhibitors 2 Failed - 1/30/2022  1:47 PM        Failed - An appropriate indication is on the problem list        Passed - Patient is at least 18 years old        Passed - Osteoporosis is not on problem list        Passed - Valid encounter within last 15 months     Recent Visits  Date Type Provider Dept   01/20/22 Office Visit Rodrick Angulo MD [facility]   09/20/21 Office Visit Rodrick Angulo MD [facility]   07/01/21 Office Visit Rodrick Angulo MD [facility]   Showing recent visits within past 720 days and meeting all other requirements  Future Appointments  No visits were found meeting these conditions.  Showing future appointments within next 150 days and meeting all other requirements      Future Appointments              Today Gayathri Vroa, PTA [place] [facility]    Today PATRICIO CHÁVEZ [place] [facility]    In 2 days Kim Guadalupe, OT [place] [facility]    In 2 days KIKE WANG, DPT [place] [facility]    In 5 days Gayathri Vora, PTA [place] [facility]    In 5 days Yaz L Sago, OT [place] [facility]    In 1 week KIKE WANG, DPT [place] [facility]    In 1 week Yaz L Sago, OT [place] [facility]    In 1 week Gayathri Vora, PTA [place] [facility]    In 1 week BLAYNE KANDACE, CURRY [place] [facility]    In 2 weeks KIKE WANG, DPT [place] [facility]    In 2 weeks Yaz L Sago, OT [place] [facility]    In 2 weeks KIKE WANG, DPT [place] [facility]    In 2 weeks Yaz L Sago, OT [place] [facility]                 Signed Prescriptions Disp Refills    tamsulosin (FLOMAX) 0.4 mg Cap 180 capsule 3     Sig: TAKE 2 CAPSULES BY MOUTH ONCE DAILY AFTER BREAKFAST       Urology: Alpha-Adrenergic Blocker Passed - 1/30/2022  1:47 PM        Passed - Patient is at least 18 years old        Passed - Prostate Cancer is not on problem list        Passed - BP > 90/50 mmH     BP Readings from Last 1 Encounters:   01/20/22 124/82               Passed - Valid encounter within last 15 months     Recent Visits  Date Type Provider Dept   01/20/22 Office Visit Rodrick Angulo MD [facility] Medicine   09/20/21 Office Visit Rodrick Angulo MD [facility]   07/01/21 Office Visit Rodrick Angulo MD [facility]   Showing recent visits within past 720 days and meeting all other requirements  Future Appointments  No visits were found meeting these conditions.  Showing future appointments within next 150 days and meeting all other requirements      Future Appointments              Today Gayathri Vora, PTA [place] [facility]    Today BLAYNE KANDACE, CURRY [place] [facility]    In 2 days Kim Guadalupe, OT [place] [facility]    In 2 days KIKE WANG, DPT [place] [facility]    In 5 days Gayathri Vora, PTA [place] [facility]    In 5 days Yaz L Sago, OT [place] [facility]    In 1 week KIKE WANG, DPT [place] [facility]    In 1 week Yaz L Sago, OT [place] [facility]    In 1 week Gayathri Vora, PTA [place] [facility]     In 1 week BLAYNE KANDACE, CURRY [place] [facility]    In 2 weeks KIKE WANG, ANDIT [place] [facility]    In 2 weeks Yaz L Sago, OT [place] [facility]    In 2 weeks KIKE WANG, DPT [place] [facility]    In 2 weeks Yaz L Sago, OT [place] [facility]                  EScitalopram oxalate (LEXAPRO) 5 MG Tab 90 tablet 3     Sig: Take 1 tablet by mouth once daily       Psychiatry:  Antidepressants - SSRI Passed - 1/30/2022  1:47 PM        Passed - Patient is at least 18 years old        Passed - Valid encounter within last 15 months     Recent Visits  Date Type Provider Dept   01/20/22 Office Visit Rodrick Angulo MD [facility]   09/20/21 Office Visit Rodrick Angulo MD [facility]   07/01/21 Office Visit Rodrick Angulo MD [facility]   Showing recent visits within past 720 days and meeting all other requirements  Future Appointments  No visits were found meeting these conditions.  Showing future appointments within next 150 days and meeting all other requirements      Future Appointments              Today Gayathri Vora, PTA [place] [facility]    Today BLAYNE KANDACE, CURRY [place] [facility]    In 2 days Kim Guadalupe, OT [place] [facility]    In 2 days KIKE WANG, DPT [place] [facility]    In 5 days Gayathri Vora, PTA [place] [facility]    In 5 days Yaz L Sago, OT [place] [facility]    In 1 week KIKE WANG, DPT [place] [facility]    In 1 week Yaz L Sago, OT [place] [facility]    In 1 week Gayathri Vora, PTA [place] [facility]    In 1 week BLAYNE KANDACE, CURRY [place] [facility]    In 2 weeks KIKE WANG, ANDIT [place] [facility]    In 2 weeks Yaz Paulino OT [place] [facility]    In 2 weeks  ANDI REESET [place] [facility]    In 2 weeks Yaz MASOUD Paulino OT [place] [facility]                      Appointments  past 12m or future 3m with PCP    Date Provider   Last Visit   1/20/2022 Rodrick Angulo MD   Next Visit   Visit date not found Rodrick Angulo MD   ED visits in past 90 days: 0     Note composed:8:54 AM 02/02/2022

## 2022-02-21 DIAGNOSIS — I10 ESSENTIAL HYPERTENSION: ICD-10-CM

## 2022-02-21 NOTE — TELEPHONE ENCOUNTER
No new care gaps identified.  Powered by [employer] by [employer]. Reference number: [id number].   2/21/2022 11:08:43 AM CST

## 2022-02-23 RX ORDER — LOSARTAN POTASSIUM 50 MG/1
TABLET ORAL
Qty: 135 TABLET | Refills: 1 | Status: SHIPPED | OUTPATIENT
Start: 2022-02-23 | End: 2022-08-24

## 2022-02-23 NOTE — TELEPHONE ENCOUNTER
Refill Routing Note   Medication(s) are not appropriate for processing by [facility] for the following reason(s):      - Medication not previously prescribed by PCP    [facility] action(s):  Defer          --->Care Gap information included in message below if applicable.   Medication reconciliation completed: No   Automatic Epic Generated Protocol Data:        Requested Prescriptions   Pending Prescriptions Disp Refills    losartan (COZAAR) 50 MG tablet [Pharmacy Med Name: Losartan Potassium 50 MG Oral Tablet] 135 tablet 1     Sig: TAKE 1 & 1/2 (ONE & ONE-HALF) TABLETS BY MOUTH ONCE DAILY       Cardiovascular:  Angiotensin Receptor Blockers Passed - 2/21/2022 11:07 AM        Passed - Patient is at least 18 years old        Passed - Last BP in normal range within 360 days     BP Readings from Last 1 Encounters:   01/20/22 124/82               Passed - Valid encounter within last 15 months     Recent Visits  Date Type Provider Dept   01/20/22 Office Visit Rodrick Angulo MD [facility] Medicine   09/20/21 Office Visit Rodrick Angulo MD [facility]   07/01/21 Office Visit Rodrick Angulo MD [facility]   Showing recent visits within past 720 days and meeting all other requirements  Future Appointments  No visits were found meeting these conditions.  Showing future appointments within next 150 days and meeting all other requirements                Passed - Cr is 1.39 or below and within 360 days     Lab Results   Component Value Date    CREATININE 1.0 08/10/2021    CREATININE 1.0 08/09/2021    CREATININE 1.0 08/08/2021    POCCRE 1.0 08/04/2021    POCCRE 1.3 06/05/2021    POCCRE 1.2 06/03/2021              Passed - K is 5.2 or below and within 360 days     POC Potassium   Date Value Ref Range Status   08/04/2021 3.4 (L) 3.5 - 5.1 mmol/L Final     Potassium   Date Value Ref Range Status   08/10/2021 3.5 3.5 - 5.1 mmol/L Final   08/09/2021 3.7 3.5 - 5.1 mmol/L Final    08/08/2021 3.7 3.5 - 5.1 mmol/L Final              Passed - eGFR within 360 days     Lab Results   Component Value Date    EGFRNONAA >60.0 08/10/2021    EGFRNONAA >60.0 08/09/2021    EGFRNONAA >60.0 08/08/2021                      Appointments  past 12m or future 3m with PCP    Date Provider   Last Visit   1/20/2022 Rodrick Angulo MD   Next Visit   Visit date not found Rodrick Angulo MD   ED visits in past 90 days: 0        Note composed:6:48 PM 02/22/2022

## 2022-03-22 ENCOUNTER — TELEPHONE (OUTPATIENT)
Dept: INTERNAL MEDICINE | Facility: CLINIC | Age: 57
End: 2022-03-22
Payer: MEDICAID

## 2022-03-22 NOTE — TELEPHONE ENCOUNTER
----- Message from Ericka Loredo sent at 3/22/2022 10:45 AM CDT -----  Regarding: Return Call  Name of Who is Calling: MIREYA GREEN [[id number]]           What is the request in detail: Patient is requesting a call back.  He is checking the status of a letter from his dentist for an upcoming dental procedure.  He states it will be in reference to him not taking his blood thinner medication for a dental procedure.  Please assist.           Can the clinic reply by [facility]: No           What Number to Call Back if not in [facility]: [phone]

## 2022-04-04 ENCOUNTER — TELEPHONE (OUTPATIENT)
Dept: INTERNAL MEDICINE | Facility: CLINIC | Age: 57
End: 2022-04-04
Payer: MEDICAID

## 2022-04-04 NOTE — TELEPHONE ENCOUNTER
----- Message from Victor M Reid sent at 4/4/2022  3:11 PM CDT -----  Who called?:PT      What is the request in detail:PT would like to know if the staff was able to send paperwork that states the PT is eligible to get his teeth pulled. Please advise        Can the clinic reply by [facility]?:Yes        Best Call Back Number: [phone]

## 2022-04-10 DIAGNOSIS — I26.99 BILATERAL PULMONARY EMBOLISM: ICD-10-CM

## 2022-04-10 DIAGNOSIS — I10 ESSENTIAL HYPERTENSION: ICD-10-CM

## 2022-04-10 DIAGNOSIS — I63.9 ACUTE ISCHEMIC STROKE: ICD-10-CM

## 2022-04-11 RX ORDER — APIXABAN 5 MG/1
TABLET, FILM COATED ORAL
Qty: 60 TABLET | Refills: 0 | Status: SHIPPED | OUTPATIENT
Start: 2022-04-11 | End: 2022-05-12

## 2022-04-12 ENCOUNTER — TELEPHONE (OUTPATIENT)
Dept: INTERNAL MEDICINE | Facility: CLINIC | Age: 57
End: 2022-04-12
Payer: MEDICAID

## 2022-04-12 NOTE — TELEPHONE ENCOUNTER
----- Message from Yee Ceballos sent at 4/12/2022 12:07 PM CDT -----  Regarding: Patient call back  Who called:MIREYA GREEN [[id number]]    What is the request in detail: Patient is requesting a call back. He states his dentist will be calling the office to get clearance for his procedure. He would like to further discuss.  Please advise.    Can the clinic reply by [facility]? No    Best call back number: [phone]    Additional Information: N/A

## 2022-04-12 NOTE — TELEPHONE ENCOUNTER
Patient states that he needs approval for have teeth pulled for a dental procedure. He states that he needs the ok that he can stop his blood thinners and the amount of time her can be off of this blood thinners. Routed to Dr. Angulo for further instructions.

## 2022-04-19 DIAGNOSIS — I63.9 ACUTE ISCHEMIC STROKE: ICD-10-CM

## 2022-04-19 DIAGNOSIS — E78.5 DYSLIPIDEMIA: ICD-10-CM

## 2022-04-19 DIAGNOSIS — I10 ESSENTIAL HYPERTENSION: ICD-10-CM

## 2022-04-19 RX ORDER — CARVEDILOL 6.25 MG/1
6.25 TABLET ORAL 2 TIMES DAILY WITH MEALS
Qty: 180 TABLET | Refills: 1 | Status: SHIPPED | OUTPATIENT
Start: 2022-04-19 | End: 2023-03-11

## 2022-04-19 RX ORDER — ATORVASTATIN CALCIUM 80 MG/1
80 TABLET, FILM COATED ORAL DAILY
Qty: 30 TABLET | Refills: 11 | Status: SHIPPED | OUTPATIENT
Start: 2022-04-19 | End: 2023-03-28

## 2022-04-19 NOTE — TELEPHONE ENCOUNTER
Please see the attached refill request. Patient has been discharged from digital medicine programs due to non-compliance and inability to enroll.

## 2022-04-19 NOTE — TELEPHONE ENCOUNTER
Care Due:                  Date            Visit Type   Department     Provider  --------------------------------------------------------------------------------                                EP -                              PRIMARY      [facility] INTERNAL  Danielkathynarendra Ever  Last Visit: 01-      [facility]   [facility]  Next Visit: None Scheduled  None         None Found                                                            Last  Test          Frequency    Reason                     Performed    Due Date  --------------------------------------------------------------------------------    Lipid Panel.  12 months..  atorvastatin.............  07- 07-    Powered by [employer] by [employer]. Reference number: [id number].   4/19/2022 10:59:47 AM CDT

## 2022-04-19 NOTE — TELEPHONE ENCOUNTER
----- Message from [facility] sent at 4/19/2022 10:44 AM CDT -----  Who Called: MIREYA GREEN     Refill or New Rx: Refill    RX Name and Strength: carvediloL (COREG) 6.25 MG tablet and  atorvastatin (LIPITOR) 80 MG tablet    How is the patient currently taking it? (ex. 1XDay): 2xday and 1xday    Is this a 30 day or 90 day RX: 90 day    Preferred Pharmacy with phone number: [facility] [phone] [address] [id number] - [address]    Local or Mail Order: Local    Ordering Provider: Rodrick Angulo MD    Best Call Back Number: [phone]    Additional Information:

## 2022-04-19 NOTE — TELEPHONE ENCOUNTER
Refill Routing Note   Medication(s) are not appropriate for processing by [facility] for the following reason(s):      - Pt is followed by [facility]    [facility] action(s):  Defer          Medication reconciliation completed: No     Appointments  past 12m or future 3m with PCP    Date Provider   Last Visit   1/20/2022 Rodrick Angulo MD   Next Visit   Visit date not found Rodrick Angulo MD   ED visits in past 90 days: 0        Note composed:3:12 PM 04/19/2022

## 2022-04-20 DIAGNOSIS — I26.99 BILATERAL PULMONARY EMBOLISM: ICD-10-CM

## 2022-04-20 DIAGNOSIS — Z12.11 COLON CANCER SCREENING: ICD-10-CM

## 2022-04-20 NOTE — TELEPHONE ENCOUNTER
No new care gaps identified.  Powered by [employer]. Reference number: [id number].   4/20/2022 5:12:08 PM CDT

## 2022-04-21 DIAGNOSIS — I26.99 BILATERAL PULMONARY EMBOLISM: ICD-10-CM

## 2022-04-21 RX ORDER — CLOPIDOGREL BISULFATE 75 MG/1
75 TABLET ORAL DAILY
Qty: 30 TABLET | Refills: 1 | Status: SHIPPED | OUTPATIENT
Start: 2022-04-21 | End: 2022-04-22 | Stop reason: SDUPTHER

## 2022-04-21 RX ORDER — CLOPIDOGREL BISULFATE 75 MG/1
TABLET ORAL
Qty: 30 TABLET | Refills: 0 | Status: SHIPPED | OUTPATIENT
Start: 2022-04-21 | End: 2022-04-22

## 2022-04-21 NOTE — TELEPHONE ENCOUNTER
----- Message from Layla Bender sent at 4/21/2022 10:47 AM CDT -----  Regarding: self [phone] PT is  Out  Type: RX Refill Request    Who Called:   self    Have you contacted your pharmacy: yes    Refill or New Rx: refill    RX Name and Strength:clopidogreL (PLAVIX) 75 mg tablet    Preferred Pharmacy with phone number:   [facility] [place] - [address]  [address]) [place] [address]  Phone: [phone] Fax: [phone]    Local or Mail Order: local  Would the patient rather a call back or a response via My [facility]?  Call back    Best Call Back Number: [phone]

## 2022-04-21 NOTE — TELEPHONE ENCOUNTER
----- Message from Layla Bender sent at 4/21/2022 10:47 AM CDT -----  Regarding: self [phone] PT is  Out  Type: RX Refill Request    Who Called:   self    Have you contacted your pharmacy: yes    Refill or New Rx: refill    RX Name and Strength:clopidogreL (PLAVIX) 75 mg tablet    Preferred Pharmacy with phone number:   [facility] [place] - [address]  [address] [address]  Phone: [phone] Fax: [phone]    Local or Mail Order: local  Would the patient rather a call back or a response via My [facility]?  Call back    Best Call Back Number: [phone]

## 2022-04-21 NOTE — TELEPHONE ENCOUNTER
No new care gaps identified.  Powered by [employer]. Reference number: [id number].   4/21/2022 11:03:50 AM CDT

## 2022-04-22 RX ORDER — CLOPIDOGREL BISULFATE 75 MG/1
75 TABLET ORAL DAILY
Qty: 30 TABLET | Refills: 0 | Status: SHIPPED | OUTPATIENT
Start: 2022-04-22 | End: 2022-05-18

## 2022-04-25 ENCOUNTER — PATIENT MESSAGE (OUTPATIENT)
Dept: INTERNAL MEDICINE | Facility: CLINIC | Age: 57
End: 2022-04-25
Payer: MEDICAID

## 2022-05-05 ENCOUNTER — TELEPHONE (OUTPATIENT)
Dept: INTERNAL MEDICINE | Facility: CLINIC | Age: 57
End: 2022-05-05
Payer: MEDICAID

## 2022-05-10 ENCOUNTER — TELEPHONE (OUTPATIENT)
Dept: INTERNAL MEDICINE | Facility: CLINIC | Age: 57
End: 2022-05-10
Payer: MEDICAID

## 2022-05-10 NOTE — TELEPHONE ENCOUNTER
----- Message from Bijal Walsh sent at 5/10/2022 10:10 AM CDT -----  Regarding: Requesting a call back  Contact: MIREYA GREEN [[id number]]  Name of Who is Calling:MIREYA GREEN [[id number]]          What is the request in detail: Pt states he needs to be seen asap, he states he foot is bruised due to him taking blood thinners . Please advise he is requesting a call back           Can the clinic reply by [facility]: No           What [facility] Back [facility]:[phone]

## 2022-05-10 NOTE — TELEPHONE ENCOUNTER
Spoke to patient and informed patient that they are no available appointment for today. Informed patient to report to Urgent care or ED for concerns. Patient declined and requested an appointment. Patient was scheduled for clinic next available appointment on 8/24/22 and was added to wait list for sooner appointment pet request. Thanks.

## 2022-05-11 ENCOUNTER — PATIENT MESSAGE (OUTPATIENT)
Dept: INTERNAL MEDICINE | Facility: CLINIC | Age: 57
End: 2022-05-11
Payer: MEDICAID

## 2022-05-11 DIAGNOSIS — E11.22 TYPE 2 DIABETES MELLITUS WITH STAGE 3 CHRONIC KIDNEY DISEASE, WITHOUT LONG-TERM CURRENT USE OF INSULIN, UNSPECIFIED WHETHER STAGE 3A OR 3B CKD: ICD-10-CM

## 2022-05-11 DIAGNOSIS — I63.40 CEREBROVASCULAR ACCIDENT (CVA) DUE TO EMBOLISM OF CEREBRAL ARTERY: Primary | ICD-10-CM

## 2022-05-11 DIAGNOSIS — N18.30 TYPE 2 DIABETES MELLITUS WITH STAGE 3 CHRONIC KIDNEY DISEASE, WITHOUT LONG-TERM CURRENT USE OF INSULIN, UNSPECIFIED WHETHER STAGE 3A OR 3B CKD: ICD-10-CM

## 2022-05-11 DIAGNOSIS — I26.99 BILATERAL PULMONARY EMBOLISM: ICD-10-CM

## 2022-05-12 ENCOUNTER — PATIENT MESSAGE (OUTPATIENT)
Dept: ORTHOPEDICS | Facility: CLINIC | Age: 57
End: 2022-05-12
Payer: MEDICAID

## 2022-05-12 NOTE — TELEPHONE ENCOUNTER
Pt is not cleared for dental surgery per Dr Angulo as he cannot stop his blood thinners at this time.

## 2022-05-17 ENCOUNTER — TELEPHONE (OUTPATIENT)
Dept: NEUROLOGY | Facility: CLINIC | Age: 57
End: 2022-05-17
Payer: MEDICAID

## 2022-05-17 NOTE — TELEPHONE ENCOUNTER
----- Message from Kusum Zuleta sent at 5/13/2022 10:57 AM CDT -----  Regarding: Appt Access  Contact: [phone]  Request Appt    Who Called:Meliton Cardozo Type:Type 2 diabetes mellitus with stage 3 chronic kidney disease, without long-term current use of insulin, unspecified whether stage 3a or 3b CKD/ Cerebrovascular accident (CVA) due to embolism of cerebral artery/ Bilateral pulmonary embolism  Call Back Number:[phone]  Additional Information: The pt call to schedule the first available appt.

## 2022-05-26 DIAGNOSIS — E11.9 TYPE 2 DIABETES MELLITUS WITHOUT COMPLICATION: ICD-10-CM

## 2022-05-30 ENCOUNTER — PATIENT MESSAGE (OUTPATIENT)
Dept: ADMINISTRATIVE | Facility: HOSPITAL | Age: 57
End: 2022-05-30
Payer: MEDICAID

## 2022-05-31 ENCOUNTER — PATIENT MESSAGE (OUTPATIENT)
Dept: INTERNAL MEDICINE | Facility: CLINIC | Age: 57
End: 2022-05-31
Payer: MEDICAID

## 2022-06-03 ENCOUNTER — PATIENT MESSAGE (OUTPATIENT)
Dept: ADMINISTRATIVE | Facility: HOSPITAL | Age: 57
End: 2022-06-03
Payer: MEDICAID

## 2022-06-03 ENCOUNTER — PATIENT OUTREACH (OUTPATIENT)
Dept: ADMINISTRATIVE | Facility: HOSPITAL | Age: 57
End: 2022-06-03
Payer: MEDICAID

## 2022-06-09 ENCOUNTER — PATIENT MESSAGE (OUTPATIENT)
Dept: FAMILY MEDICINE | Facility: CLINIC | Age: 57
End: 2022-06-09
Payer: MEDICAID

## 2022-06-09 ENCOUNTER — PATIENT OUTREACH (OUTPATIENT)
Dept: FAMILY MEDICINE | Facility: CLINIC | Age: 57
End: 2022-06-09
Payer: MEDICAID

## 2022-06-14 ENCOUNTER — LAB VISIT (OUTPATIENT)
Dept: LAB | Facility: HOSPITAL | Age: 57
End: 2022-06-14
Attending: FAMILY MEDICINE
Payer: MEDICAID

## 2022-06-14 DIAGNOSIS — Z12.11 COLON CANCER SCREENING: ICD-10-CM

## 2022-06-14 PROCEDURE — 82274 ASSAY TEST FOR BLOOD FECAL: CPT | Performed by: FAMILY MEDICINE

## 2022-06-15 ENCOUNTER — TELEPHONE (OUTPATIENT)
Dept: PHYSICAL MEDICINE AND REHAB | Facility: CLINIC | Age: 57
End: 2022-06-15
Payer: MEDICAID

## 2022-06-15 ENCOUNTER — OFFICE VISIT (OUTPATIENT)
Dept: NEUROLOGY | Facility: CLINIC | Age: 57
End: 2022-06-15
Payer: MEDICAID

## 2022-06-15 VITALS
HEIGHT: 67 IN | WEIGHT: 189.63 LBS | BODY MASS INDEX: 29.76 KG/M2 | HEART RATE: 76 BPM | SYSTOLIC BLOOD PRESSURE: 153 MMHG | DIASTOLIC BLOOD PRESSURE: 101 MMHG

## 2022-06-15 DIAGNOSIS — Z86.73 H/O: STROKE: Primary | ICD-10-CM

## 2022-06-15 DIAGNOSIS — R47.1 DYSARTHRIA: ICD-10-CM

## 2022-06-15 DIAGNOSIS — E78.5 DYSLIPIDEMIA: ICD-10-CM

## 2022-06-15 DIAGNOSIS — I63.50 RIGHT PONTINE STROKE: ICD-10-CM

## 2022-06-15 DIAGNOSIS — E11.22 TYPE 2 DIABETES MELLITUS WITH STAGE 3 CHRONIC KIDNEY DISEASE, WITHOUT LONG-TERM CURRENT USE OF INSULIN, UNSPECIFIED WHETHER STAGE 3A OR 3B CKD: ICD-10-CM

## 2022-06-15 DIAGNOSIS — N18.30 TYPE 2 DIABETES MELLITUS WITH STAGE 3 CHRONIC KIDNEY DISEASE, WITHOUT LONG-TERM CURRENT USE OF INSULIN, UNSPECIFIED WHETHER STAGE 3A OR 3B CKD: ICD-10-CM

## 2022-06-15 DIAGNOSIS — G81.10 SPASTIC HEMIPARESIS: ICD-10-CM

## 2022-06-15 DIAGNOSIS — R94.39 ABNORMAL STRESS ECHO: ICD-10-CM

## 2022-06-15 DIAGNOSIS — I10 ESSENTIAL HYPERTENSION: ICD-10-CM

## 2022-06-15 PROCEDURE — 3077F SYST BP >= 140 MM HG: CPT | Mod: CPTII,,, | Performed by: NURSE PRACTITIONER

## 2022-06-15 PROCEDURE — 3077F PR MOST RECENT SYSTOLIC BLOOD PRESSURE >= 140 MM HG: ICD-10-PCS | Mod: CPTII,,, | Performed by: NURSE PRACTITIONER

## 2022-06-15 PROCEDURE — 99999 PR PBB SHADOW E&M-EST. PATIENT-LVL IV: ICD-10-PCS | Mod: PBBFAC,,, | Performed by: NURSE PRACTITIONER

## 2022-06-15 PROCEDURE — 3008F PR BODY MASS INDEX (BMI) DOCUMENTED: ICD-10-PCS | Mod: CPTII,,, | Performed by: NURSE PRACTITIONER

## 2022-06-15 PROCEDURE — 1159F PR MEDICATION LIST DOCUMENTED IN MEDICAL RECORD: ICD-10-PCS | Mod: CPTII,,, | Performed by: NURSE PRACTITIONER

## 2022-06-15 PROCEDURE — 3080F PR MOST RECENT DIASTOLIC BLOOD PRESSURE >= 90 MM HG: ICD-10-PCS | Mod: CPTII,,, | Performed by: NURSE PRACTITIONER

## 2022-06-15 PROCEDURE — 1160F RVW MEDS BY RX/DR IN RCRD: CPT | Mod: CPTII,,, | Performed by: NURSE PRACTITIONER

## 2022-06-15 PROCEDURE — 99999 PR PBB SHADOW E&M-EST. PATIENT-LVL IV: CPT | Mod: PBBFAC,,, | Performed by: NURSE PRACTITIONER

## 2022-06-15 PROCEDURE — 4010F ACE/ARB THERAPY RXD/TAKEN: CPT | Mod: CPTII,,, | Performed by: NURSE PRACTITIONER

## 2022-06-15 PROCEDURE — 3008F BODY MASS INDEX DOCD: CPT | Mod: CPTII,,, | Performed by: NURSE PRACTITIONER

## 2022-06-15 PROCEDURE — 4010F PR ACE/ARB THEARPY RXD/TAKEN: ICD-10-PCS | Mod: CPTII,,, | Performed by: NURSE PRACTITIONER

## 2022-06-15 PROCEDURE — 99215 OFFICE O/P EST HI 40 MIN: CPT | Mod: S$PBB,,, | Performed by: NURSE PRACTITIONER

## 2022-06-15 PROCEDURE — 3080F DIAST BP >= 90 MM HG: CPT | Mod: CPTII,,, | Performed by: NURSE PRACTITIONER

## 2022-06-15 PROCEDURE — 1159F MED LIST DOCD IN RCRD: CPT | Mod: CPTII,,, | Performed by: NURSE PRACTITIONER

## 2022-06-15 PROCEDURE — 1160F PR REVIEW ALL MEDS BY PRESCRIBER/CLIN PHARMACIST DOCUMENTED: ICD-10-PCS | Mod: CPTII,,, | Performed by: NURSE PRACTITIONER

## 2022-06-15 PROCEDURE — 99215 PR OFFICE/OUTPT VISIT, EST, LEVL V, 40-54 MIN: ICD-10-PCS | Mod: S$PBB,,, | Performed by: NURSE PRACTITIONER

## 2022-06-15 PROCEDURE — 99214 OFFICE O/P EST MOD 30 MIN: CPT | Mod: PBBFAC | Performed by: NURSE PRACTITIONER

## 2022-06-15 NOTE — TELEPHONE ENCOUNTER
----- Message from Anjana Nayak MA sent at 6/15/2022 12:52 PM CDT -----  Regarding: Appt (medicaid) referred by neurology Piedad Casanova NP  Good Afternoon,       Can you please contact patient to get him scheduled for Dr. Castrejon next available.      Dx:G81.10 (ICD-10-CM) - Spastic hemiparesis    Phone: [phone] Corry (care giver)       Thank you  Anjana Nayak MA

## 2022-06-16 ENCOUNTER — TELEPHONE (OUTPATIENT)
Dept: NEUROLOGY | Facility: CLINIC | Age: 57
End: 2022-06-16
Payer: MEDICAID

## 2022-06-16 ENCOUNTER — PATIENT MESSAGE (OUTPATIENT)
Dept: NEUROLOGY | Facility: CLINIC | Age: 57
End: 2022-06-16
Payer: MEDICAID

## 2022-06-16 PROBLEM — I63.50 RIGHT PONTINE STROKE: Status: ACTIVE | Noted: 2022-06-16

## 2022-06-16 NOTE — PROGRESS NOTES
[facility] VISIT      SUBJECTIVE:    History for Present Illness: Meliton Tyson is a 57 y.o.  male with past medical history of HTN, DM, HLD, DVT and PE who presents to me in clinic today for initial assessment and recommendations following hospitalization on 07/08/2021 with left-sided weakness.  MRI of the brain completed as part of initial assessment indicated right pontine stroke.  He is accompanied to today's visit by his wife.    At the time of today's visit the patient reports residual left-sided weakness.He denies associated nausea, vomiting, HA ,vertigo, double vision,  language or visual disturbances.  Patient is independent to mod assist with ADLs.  He can ambulate with walker however uses wheelchair for long distances.  He denies alcohol/tobacco/illicit drug use.  He is compliant with home medications    Past Medical History:   Diagnosis Date    Diabetes mellitus     Hypertension     Stroke      History reviewed. No pertinent surgical history.  Family History   Problem Relation Age of Onset    Asthma Mother     Diabetes Father     Hypertension Father         Current Outpatient Medications:     amLODIPine (NORVASC) 10 MG tablet, Take 1 tablet (10 mg total) by mouth once daily., Disp: 30 tablet, Rfl: 1    atorvastatin (LIPITOR) 80 MG tablet, Take 1 tablet (80 mg total) by mouth once daily., Disp: 30 tablet, Rfl: 11    carvediloL (COREG) 6.25 MG tablet, Take 1 tablet (6.25 mg total) by mouth 2 (two) times daily with meals., Disp: 180 tablet, Rfl: 1    ELIQUIS 5 mg Tab, TAKE 2 TABLETS BY MOUTH TWICE DAILY FOR 2 DAYS THEN 1 TWICE DAILY, Disp: 60 tablet, Rfl: 0    EScitalopram oxalate (LEXAPRO) 5 MG Tab, Take 1 tablet by mouth once daily, Disp: 90 tablet, Rfl: 3    lactulose (CHRONULAC) 20 gram/30 mL Soln, Take 15 mLs (10 g total) by mouth 3 (three) times daily., Disp: 4050 mL, Rfl: 3    losartan (COZAAR) 50 MG tablet, TAKE 1 & 1/2 (ONE & ONE-HALF) TABLETS BY MOUTH  "ONCE DAILY, Disp: 135 tablet, Rfl: 1    pantoprazole (PROTONIX) 40 MG tablet, Take 1 tablet by mouth once daily, Disp: 90 tablet, Rfl: 3    senna-docusate 8.6-50 mg (PERICOLACE) 8.6-50 mg per tablet, Take 1 tablet by mouth 2 (two) times a day., Disp: , Rfl:     tamsulosin (FLOMAX) 0.4 mg Cap, TAKE 2 CAPSULES BY MOUTH ONCE DAILY AFTER BREAKFAST, Disp: 180 capsule, Rfl: 3    melatonin 1 mg Tab, Take 1 mg by mouth every evening., Disp: , Rfl:      Review of Systems:   Constitution: negative for lethargy ; positive changes in weight  Eyes: no eyesight worsening; no double vision; no eye pain  ENT/Mouth: no nasal congestion ; no nose bleeds; no sore throat or hoarseness  Cardiovascular:  no chest pain with exertion; no palpitations  Respiratory: Normal effort and rate; no coughing  Gastrointestinal: No N/V; negative abdominal pain; no changes in bowel habits; positive constipation  Genitourinary:  no increased frequency in voiding ; no incontinence  Musculoskeletal:  No joint pain; no swelling; no myalgia ; left-sided weakness  Skin:  negative for skin rash or lesions  Hematologic:  Bruising to right heel  Neurologic: negative headache; negative dizziness; negative confusion    OBJECTIVE:    BP (!) 153/101   Pulse 76   Ht 5' 7" (1.702 m)   Wt 86 kg (189 lb 9.5 oz)   BMI 29.69 kg/m²     Physical Exam   Constitution: He appears well nourished. No distress   LOC: Alert and follows request  Head: Normocephalic and atraumatic.   Cardiovascular: Normal rate. Intact distal pulses  Pulmonary/Chest: Effort normal. No respiratory distress  Musculoskeletal: Negative joint swelling or tenderness. No range of motion restrictions elsewhere about the body except that noted in the history of present illness.  Discoloration to right heel skin intact.  Psychiatric: no pressured speech; normal affect; no evidence of impaired cognition    Neurologic Exam:  Orientation: person, place and time  Language: No aphasia  Speech: No " dysarthria  Memory: Recent memory intact; Remote memory intact; age correct; month correct  Visual Fields (CN II):  Full  EOM (CN III, IV, VI): Full intact  Pupils (CN II, III): PERRL  Facial Sensation (CN V): symmetric  Facial Movement (CN VII): Symmetrical facial expressions   Hearing (CN VIII):  Intact bilaterally   Shoulder/Neck (CN XI): SCM-Left: Normal; SCM-Right: Normal; Left Shoulder Shrug:  Decreased ; Right Shoulder Shrug: Normal  Tongue (CN XII): to midline  Motor examination of all extremities :demonstrates normal bulk and tone in all four limbs. There are no atrophy or fasciculations.        Left Right     Left Right   Deltoid 3/5 5/5   Hip Flexion 4/5 5/5   Biceps  5/5   Hip Extension 4/5 5/5   Triceps  5/5   Knee Flexion 4/5 5/5   Wrist Ext 1/5 5/5   Knee Extension 4/5 5/5   Finger Abd 4-/5 5/5   Ankle dorsiflex  5/5           Ankle plantar flex  5/5     Sensory examination: is normal light touch in BUE and BLE.  Gait:  Unable to assess gait at visit.  On exam patient has minimal ankle dorsiflexion/plantar flexion  Coordination: No dysmetria with finger-to-nose or heel-to-shin. Rapid alternating movements and fine finger movements are intact.                                                                                                                                                                                          NIHSS:3  Modified Noble Score:  2     Relevant Labwork:  Recent Labs   Lab 07/07/21  1445 07/08/21  0419 09/20/21  1228   Hemoglobin A1C  --    < > 5.7 H   LDL Cholesterol 87.0  --   --    HDL 26 L  --   --    Triglycerides 105  --   --    Cholesterol 134  --   --     < > = values in this interval not displayed.       Diagnostic Results:  Brain Imaging   MRI of the brain 07/08/2021:  Acute pontine infarct lateralizing to the RIGHT     No significant arterial abnormalities.    Vessel Imaging   MRA of the brain 07/08/2021:  Intracranial arteries:  No focal high-grade stenosis,  occlusion, or aneurysm    CTA head and neck 06/03/2021:  Limited exam due to suboptimal bolus timing with poor opacification throughout the arterial tree.  No compelling evidence of high-grade stenosis or large vessel occlusion  Cardiac Imaging   TTE 08/05/2021:  · The left ventricle is normal in size with concentric remodeling and normal systolic function. The estimated ejection fraction is 60%.  · There is abnormal septal wall motion.  · Normal left ventricular diastolic function.  · RV was not well visualized, but in general appeared to have normal size and systolic function.  · Mild tricuspid regurgitation.  · The estimated PA systolic pressure is 30 mmHg.  · Intermediate central venous pressure (8 mmHg).      Assessment:  Meliton Tyson  is a 57 y.o.  male  seen today in clinic for follow-up assessment and recommendations. The following recommendations and plan were discussed in depth with the patient who voiced understanding and was in agreement.  Plan:  History of right pontine stroke  -Plan for aggressive risk factor modification and maximum medical management  - Antithrombotics:  Patient currently on Eliquis due to PE.  If Eliquis is discontinued recommend aspirin 81 mg daily indefinitely  - Statins: continue atorvastatin Lipitor 80 mg daily  - Aggressive risk factor modification: HTN, Diet, Exercise, HLD,DM  - Rehab efforts:  None  - Diagnostics ordered/pending: none   -ambulatory referral to Cardiology for abnormal septal wall motion and left ventricular diastolic function indicated on TTE 08/05/2021    PE  -continue home Eliquis  -follow-up with PCP for chronic management    Essential Hypertension   - Continue home medications  - Long term goal 130/80  -Follow up with PCP for chronic management and CV risk reduction    Mixed Hyperlipidemia   Lab Results   Component Value Date    LDLCALC 87.0 07/07/2021     -Target is LDL < 70mg/dL  - Continue atorvastatin 80 mg daily  - Follow up with PCP for chronic  management and CV risk reduction    Type 2 diabetes mellitus   Lab Results   Component Value Date    HGBA1C 5.7 (H) 09/20/2021      -Target hemoglobin A1c <7%, measured 2X/year or quarterly if not meeting goals  -Follow up with PCP/endocrinology for chronic management and CV risk reduction    Left spastic hemiparesis  -late effect of stroke  -Limited ROM noted today with  spasticity noted on exam .    - Will refer to PM&R-Dr. Castrejon for possible Botox injections    Patient/Family teaching  -A significant amount of time was spent reviewing the patient's stroke risk factors   -Counseled on lifestyle modifications for risk factor reduction     We discussed the usual stroke warning signs and symptoms, and the need to activate EMS (call 911) as soon as the symptoms present.  - Sudden onset numbness or weakness of the face, arm, or leg;  especially on one side of the body  - Sudden confusion, trouble speaking, or understanding  - Sudden trouble seeing in one or both eyes  - Sudden trouble walking, dizziness, loss of coordination  - Sudden severe headache with no known cause      I will plan on having Meliton return to clinic as needed. The patient can contact my office with any questions or concerns they may have as they arise in the interim.     45 minutes of total time spent on the encounter, which includes face to face time and non-face to face time preparing to see the patient (eg, review of tests), Obtaining and/or reviewing separately obtained history, Documenting clinical information in the electronic or other health record, Independently interpreting results (not separately reported) and communicating results to the patient/family/caregiver, patient/family education and Care coordination (not separately reported).     Piedad Casanova NP-C  Department of Vascular Neurology  [facility]  [phone]    This note is dictated on M*Modal Fluency Direct word recognition program. There are word  recognition mistakes that are occasionally missed on review

## 2022-06-16 NOTE — TELEPHONE ENCOUNTER
Spoke with the patients wife. Clarified patient does not need ASA and Plavix sine he is on Elaquis. If Elaquis is discontinued he will need to be on ASA 81 mg daily, indefinitely.She verbalized understanding.

## 2022-06-21 LAB — HEMOCCULT STL QL IA: NEGATIVE

## 2022-06-22 ENCOUNTER — TELEPHONE (OUTPATIENT)
Dept: INTERNAL MEDICINE | Facility: CLINIC | Age: 57
End: 2022-06-22
Payer: MEDICAID

## 2022-06-22 NOTE — TELEPHONE ENCOUNTER
----- Message from Rodrick Angulo MD sent at 6/22/2022 12:30 PM CDT -----  The patient's recent Fit Kit was negative for blood.    No changes in medications.  Followup as scheduled.

## 2022-07-13 ENCOUNTER — PATIENT OUTREACH (OUTPATIENT)
Dept: INTERNAL MEDICINE | Facility: CLINIC | Age: 57
End: 2022-07-13
Payer: MEDICAID

## 2022-07-13 ENCOUNTER — PATIENT MESSAGE (OUTPATIENT)
Dept: INTERNAL MEDICINE | Facility: CLINIC | Age: 57
End: 2022-07-13
Payer: MEDICAID

## 2022-07-13 DIAGNOSIS — Z13.6 ENCOUNTER FOR LIPID SCREENING FOR CARDIOVASCULAR DISEASE: Primary | ICD-10-CM

## 2022-07-13 DIAGNOSIS — Z13.220 ENCOUNTER FOR LIPID SCREENING FOR CARDIOVASCULAR DISEASE: Primary | ICD-10-CM

## 2022-07-19 ENCOUNTER — OFFICE VISIT (OUTPATIENT)
Dept: CARDIOLOGY | Facility: CLINIC | Age: 57
End: 2022-07-19
Payer: MEDICAID

## 2022-07-19 VITALS
DIASTOLIC BLOOD PRESSURE: 96 MMHG | OXYGEN SATURATION: 97 % | SYSTOLIC BLOOD PRESSURE: 160 MMHG | HEIGHT: 67 IN | WEIGHT: 189 LBS | BODY MASS INDEX: 29.66 KG/M2 | HEART RATE: 75 BPM

## 2022-07-19 DIAGNOSIS — I26.99 BILATERAL PULMONARY EMBOLISM: ICD-10-CM

## 2022-07-19 DIAGNOSIS — I26.99 PULMONARY EMBOLISM, UNSPECIFIED CHRONICITY, UNSPECIFIED PULMONARY EMBOLISM TYPE, UNSPECIFIED WHETHER ACUTE COR PULMONALE PRESENT: ICD-10-CM

## 2022-07-19 DIAGNOSIS — R93.1 ABNORMAL ECHOCARDIOGRAM: Primary | ICD-10-CM

## 2022-07-19 PROCEDURE — 1160F RVW MEDS BY RX/DR IN RCRD: CPT | Mod: CPTII,,, | Performed by: NURSE PRACTITIONER

## 2022-07-19 PROCEDURE — 3077F PR MOST RECENT SYSTOLIC BLOOD PRESSURE >= 140 MM HG: ICD-10-PCS | Mod: CPTII,,, | Performed by: NURSE PRACTITIONER

## 2022-07-19 PROCEDURE — 4010F ACE/ARB THERAPY RXD/TAKEN: CPT | Mod: CPTII,,, | Performed by: NURSE PRACTITIONER

## 2022-07-19 PROCEDURE — 99215 PR OFFICE/OUTPT VISIT, EST, LEVL V, 40-54 MIN: ICD-10-PCS | Mod: S$PBB,,, | Performed by: NURSE PRACTITIONER

## 2022-07-19 PROCEDURE — 4010F PR ACE/ARB THEARPY RXD/TAKEN: ICD-10-PCS | Mod: CPTII,,, | Performed by: NURSE PRACTITIONER

## 2022-07-19 PROCEDURE — 3077F SYST BP >= 140 MM HG: CPT | Mod: CPTII,,, | Performed by: NURSE PRACTITIONER

## 2022-07-19 PROCEDURE — 3080F DIAST BP >= 90 MM HG: CPT | Mod: CPTII,,, | Performed by: NURSE PRACTITIONER

## 2022-07-19 PROCEDURE — 93010 EKG 12-LEAD: ICD-10-PCS | Mod: S$PBB,,, | Performed by: INTERNAL MEDICINE

## 2022-07-19 PROCEDURE — 1159F MED LIST DOCD IN RCRD: CPT | Mod: CPTII,,, | Performed by: NURSE PRACTITIONER

## 2022-07-19 PROCEDURE — 1159F PR MEDICATION LIST DOCUMENTED IN MEDICAL RECORD: ICD-10-PCS | Mod: CPTII,,, | Performed by: NURSE PRACTITIONER

## 2022-07-19 PROCEDURE — 1160F PR REVIEW ALL MEDS BY PRESCRIBER/CLIN PHARMACIST DOCUMENTED: ICD-10-PCS | Mod: CPTII,,, | Performed by: NURSE PRACTITIONER

## 2022-07-19 PROCEDURE — 3080F PR MOST RECENT DIASTOLIC BLOOD PRESSURE >= 90 MM HG: ICD-10-PCS | Mod: CPTII,,, | Performed by: NURSE PRACTITIONER

## 2022-07-19 PROCEDURE — 99214 OFFICE O/P EST MOD 30 MIN: CPT | Mod: PBBFAC,PN | Performed by: NURSE PRACTITIONER

## 2022-07-19 PROCEDURE — 93005 ELECTROCARDIOGRAM TRACING: CPT | Mod: PBBFAC,PN | Performed by: INTERNAL MEDICINE

## 2022-07-19 PROCEDURE — 99999 PR PBB SHADOW E&M-EST. PATIENT-LVL IV: ICD-10-PCS | Mod: PBBFAC,,, | Performed by: NURSE PRACTITIONER

## 2022-07-19 PROCEDURE — 93010 ELECTROCARDIOGRAM REPORT: CPT | Mod: S$PBB,,, | Performed by: INTERNAL MEDICINE

## 2022-07-19 PROCEDURE — 3008F PR BODY MASS INDEX (BMI) DOCUMENTED: ICD-10-PCS | Mod: CPTII,,, | Performed by: NURSE PRACTITIONER

## 2022-07-19 PROCEDURE — 99215 OFFICE O/P EST HI 40 MIN: CPT | Mod: S$PBB,,, | Performed by: NURSE PRACTITIONER

## 2022-07-19 PROCEDURE — 99999 PR PBB SHADOW E&M-EST. PATIENT-LVL IV: CPT | Mod: PBBFAC,,, | Performed by: NURSE PRACTITIONER

## 2022-07-19 PROCEDURE — 3046F PR MOST RECENT HEMOGLOBIN A1C LEVEL > 9.0%: ICD-10-PCS | Mod: CPTII,,, | Performed by: NURSE PRACTITIONER

## 2022-07-19 PROCEDURE — 3046F HEMOGLOBIN A1C LEVEL >9.0%: CPT | Mod: CPTII,,, | Performed by: NURSE PRACTITIONER

## 2022-07-19 PROCEDURE — 3008F BODY MASS INDEX DOCD: CPT | Mod: CPTII,,, | Performed by: NURSE PRACTITIONER

## 2022-07-19 RX ORDER — CLONIDINE 0.3 MG/D
1 PATCH TRANSDERMAL
COMMUNITY
Start: 2022-06-27 | End: 2022-08-24 | Stop reason: SDUPTHER

## 2022-07-19 RX ORDER — LOSARTAN POTASSIUM 25 MG/1
25 TABLET ORAL DAILY
COMMUNITY
Start: 2022-06-26 | End: 2022-08-24

## 2022-07-19 NOTE — PATIENT INSTRUCTIONS
We will get an echocardiogram and a stress test    We will get you set up for blood work    We will see you back in about a month    Keep taking all of your medications as you have

## 2022-07-19 NOTE — PROGRESS NOTES
Cardiology    7/19/2022  9:53 AM    Problem list  Patient Active Problem List   Diagnosis    Type 2 diabetes mellitus, without long-term current use of insulin    Acute ischemic stroke    Dyslipidemia    Weakness    Class 2 obesity in adult    Essential hypertension    Stage 3 chronic kidney disease    Stroke    Slow transit constipation    Acute deep vein thrombosis (DVT) of femoral vein of left lower extremity    Acute pulmonary embolism    Cerebrovascular accident (CVA) due to embolism    Bilateral pulmonary embolism    Urinary retention    Suspected sleep apnea    Dysarthria    Right pontine stroke       CC:  Abnormal echo    HPI:  Had 2 strokes last summer- Went to rehab 6/3/21 for a month, 7/6/21 had a CVA and was in rehab for 3 months  Had a PE and went back into the hospital 8/2021, then went back to rehab for several weeks after    No heart problems in the family that he knows of  Uses walker for short distances, has been able to increase some of his independence with ADLs  No Matthew or chest pain that he can say  Uses a band on his foot because of a blood clot in the past.  As a result his feet swell sometimes. No orthopnea  Checks BPs at home, sometimes it is elevated. BPs sometimes 120s, 130s, sometimes 160s    Referred to us for an abnormal stress echo, but chart review reveals that he had an abnormal echo without stress testing in August 2021.  The echo showed a wall motion abnormality            Medications  Current Outpatient Medications   Medication Sig Dispense Refill    amLODIPine (NORVASC) 10 MG tablet Take 1 tablet (10 mg total) by mouth once daily. 30 tablet 1    atorvastatin (LIPITOR) 80 MG tablet Take 1 tablet (80 mg total) by mouth once daily. 30 tablet 11    carvediloL (COREG) 6.25 MG tablet Take 1 tablet (6.25 mg total) by mouth 2 (two) times daily with meals. 180 tablet 1    ELIQUIS 5 mg Tab TAKE 2 TABLETS BY MOUTH TWICE DAILY FOR  2  DAYS  THEN  1  TABLET  TWICE   DAILY 60 tablet 2    EScitalopram oxalate (LEXAPRO) 5 MG Tab Take 1 tablet by mouth once daily 90 tablet 3    lactulose (CHRONULAC) 20 gram/30 mL Soln Take 15 mLs (10 g total) by mouth 3 (three) times daily. 4050 mL 3    losartan (COZAAR) 25 MG tablet Take 25 mg by mouth once daily.      losartan (COZAAR) 50 MG tablet TAKE 1 & 1/2 (ONE & ONE-HALF) TABLETS BY MOUTH ONCE DAILY 135 tablet 1    pantoprazole (PROTONIX) 40 MG tablet Take 1 tablet by mouth once daily 90 tablet 3    senna-docusate 8.6-50 mg (PERICOLACE) 8.6-50 mg per tablet Take 1 tablet by mouth 2 (two) times a day.      CATAPRES-TTS-3 0.3 mg/24 hr 1 patch every 7 days.       No current facility-administered medications for this visit.      Prior to Admission medications    Medication Sig Start Date End Date Taking? Authorizing Provider   amLODIPine (NORVASC) 10 MG tablet Take 1 tablet (10 mg total) by mouth once daily. 12/3/21 12/3/22 Yes Rodrick Angulo MD   atorvastatin (LIPITOR) 80 MG tablet Take 1 tablet (80 mg total) by mouth once daily. 4/19/22 4/19/23 Yes Rodrick Angulo MD   carvediloL (COREG) 6.25 MG tablet Take 1 tablet (6.25 mg total) by mouth 2 (two) times daily with meals. 4/19/22  Yes Rodrick Angulo MD   ELIQUIS 5 mg Tab TAKE 2 TABLETS BY MOUTH TWICE DAILY FOR  2  DAYS  THEN  1  TABLET  TWICE  DAILY 7/11/22  Yes Rodrick Angulo MD   EScitalopram oxalate (LEXAPRO) 5 MG Tab Take 1 tablet by mouth once daily 2/2/22  Yes Rodrick Angulo MD   lactulose (CHRONULAC) 20 gram/30 mL Soln Take 15 mLs (10 g total) by mouth 3 (three) times daily. 1/20/22 1/20/23 Yes Rodrick Angulo MD   losartan (COZAAR) 25 MG tablet Take 25 mg by mouth once daily. 6/26/22  Yes Historical Provider   losartan (COZAAR) 50 MG tablet TAKE 1 & 1/2 (ONE & ONE-HALF) TABLETS BY MOUTH ONCE DAILY 2/23/22  Yes Rodrick Angulo MD   pantoprazole (PROTONIX) 40 MG tablet Take 1 tablet by mouth once daily 2/2/22  Yes  Rodrick Angulo MD   senna-docusate 8.6-50 mg (PERICOLACE) 8.6-50 mg per tablet Take 1 tablet by mouth 2 (two) times a day.   Yes Historical Provider   CATAPRES-TTS-3 0.3 mg/24 hr 1 patch every 7 days. 6/27/22   Historical Provider   melatonin 1 mg Tab Take 1 mg by mouth every evening.  7/19/22  Historical Provider   tamsulosin (FLOMAX) 0.4 mg Cap TAKE 2 CAPSULES BY MOUTH ONCE DAILY AFTER BREAKFAST 2/2/22 7/19/22  Rodrick Angulo MD         History  Past Medical History:   Diagnosis Date    Diabetes mellitus     Hypertension     Stroke      History reviewed. No pertinent surgical history.  Social History     Socioeconomic History    Marital status:    Tobacco Use    Smoking status: Never Smoker    Smokeless tobacco: Never Used   Substance and Sexual Activity    Alcohol use: No    Drug use: No         Allergies  Review of patient's allergies indicates:  No Known Allergies      Review of Systems   Review of Systems   Constitutional: Negative for diaphoresis and malaise/fatigue.   HENT: Negative.    Cardiovascular: Positive for dyspnea on exertion. Negative for chest pain, claudication, irregular heartbeat, leg swelling, near-syncope, orthopnea, palpitations, paroxysmal nocturnal dyspnea and syncope.   Respiratory: Negative for shortness of breath.    Endocrine: Negative for polydipsia, polyphagia and polyuria.   Hematologic/Lymphatic: Does not bruise/bleed easily.   Musculoskeletal: Positive for stiffness.   Gastrointestinal: Negative for bloating, nausea and vomiting.   Genitourinary: Negative.    Neurological: Positive for loss of balance. Negative for excessive daytime sleepiness, dizziness, light-headedness and weakness.   Psychiatric/Behavioral: The patient is not nervous/anxious.    Allergic/Immunologic: Negative.          Physical Exam  Wt Readings from Last 1 Encounters:   07/19/22 85.7 kg (189 lb)     BP Readings from Last 3 Encounters:   07/19/22 (!) 160/96   06/15/22 (!)  153/101   01/20/22 124/82     Pulse Readings from Last 1 Encounters:   07/19/22 75     Body mass index is 29.6 kg/m².    Physical Exam  Vitals and nursing note reviewed.   Constitutional:       Appearance: Normal appearance.      Comments: In wheelchair   HENT:      Head: Normocephalic and atraumatic.      Mouth/Throat:      Mouth: Mucous membranes are moist.   Eyes:      Pupils: Pupils are equal, round, and reactive to light.   Cardiovascular:      Rate and Rhythm: Normal rate and regular rhythm.      Pulses:           Radial pulses are 2+ on the right side and 2+ on the left side.        Dorsalis pedis pulses are 2+ on the right side and 2+ on the left side.        Posterior tibial pulses are 2+ on the right side and 2+ on the left side.      Heart sounds: No murmur heard.  Pulmonary:      Effort: Pulmonary effort is normal. No respiratory distress.      Breath sounds: Normal breath sounds.   Abdominal:      General: There is no distension.      Tenderness: There is no abdominal tenderness.   Musculoskeletal:      Cervical back: Normal range of motion.      Right lower leg: No edema.      Left lower leg: No edema.   Skin:     General: Skin is warm and dry.      Findings: No erythema.   Neurological:      General: No focal deficit present.      Mental Status: He is alert.   Psychiatric:         Mood and Affect: Mood normal.         Behavior: Behavior normal.       Problem List Items Addressed This Visit        Cardiac/Vascular    Abnormal echocardiogram - Primary    Overview     Wall motion abnormality seen in August 2021, some breathlessness with activity, unclear if anginal equivalent  Repeat echo  EKG difficult to read today due to artifact  Recommend stress test as he does have risk factors for CVD (HTN, DM)  Await stress EKG           Current Assessment & Plan     As above           Relevant Orders    IN OFFICE EKG 12-LEAD (to Muse)    Echo    COMPREHENSIVE METABOLIC PANEL    CBC W/ AUTO DIFFERENTIAL     Exercise Stress - EKG       Hematology    Bilateral pulmonary embolism    Overview     Has been on DOAC since Aug 2021, no issues with bleeding  No missed doses  Check D-dimer, if normal may be able to discontinue the Eliquis.            Current Assessment & Plan     As above             Other Visit Diagnoses     Pulmonary embolism, unspecified chronicity, unspecified pulmonary embolism type, unspecified whether acute cor pulmonale present        Relevant Orders    D-DIMER, QUANTITATIVE                        Follow Up  4-6 wks      @Aide Metzger DNP

## 2022-07-20 ENCOUNTER — TELEPHONE (OUTPATIENT)
Dept: DIABETES | Facility: CLINIC | Age: 57
End: 2022-07-20
Payer: MEDICAID

## 2022-07-20 DIAGNOSIS — E11.22 TYPE 2 DIABETES MELLITUS WITH STAGE 3 CHRONIC KIDNEY DISEASE, WITHOUT LONG-TERM CURRENT USE OF INSULIN, UNSPECIFIED WHETHER STAGE 3A OR 3B CKD: Primary | ICD-10-CM

## 2022-07-20 DIAGNOSIS — N18.30 TYPE 2 DIABETES MELLITUS WITH STAGE 3 CHRONIC KIDNEY DISEASE, WITHOUT LONG-TERM CURRENT USE OF INSULIN, UNSPECIFIED WHETHER STAGE 3A OR 3B CKD: Primary | ICD-10-CM

## 2022-07-20 PROBLEM — R93.1 ABNORMAL ECHOCARDIOGRAM: Status: ACTIVE | Noted: 2022-07-20

## 2022-07-21 ENCOUNTER — TELEPHONE (OUTPATIENT)
Dept: ADMINISTRATIVE | Facility: OTHER | Age: 57
End: 2022-07-21
Payer: MEDICAID

## 2022-07-21 NOTE — TELEPHONE ENCOUNTER
Spoke to patient about scheduling [facility] appointment. Request a call back as he needs to speak to his wife before scheduling as he is not able to drive.

## 2022-07-25 ENCOUNTER — TELEPHONE (OUTPATIENT)
Dept: ADMINISTRATIVE | Facility: OTHER | Age: 57
End: 2022-07-25
Payer: MEDICAID

## 2022-07-25 NOTE — TELEPHONE ENCOUNTER
Left voice message for patient to return call to schedule appointment from referral to [facility].  Richelle [phone]

## 2022-07-29 ENCOUNTER — PATIENT OUTREACH (OUTPATIENT)
Dept: ADMINISTRATIVE | Facility: HOSPITAL | Age: 57
End: 2022-07-29
Payer: MEDICAID

## 2022-07-29 ENCOUNTER — PATIENT MESSAGE (OUTPATIENT)
Dept: ADMINISTRATIVE | Facility: HOSPITAL | Age: 57
End: 2022-07-29
Payer: MEDICAID

## 2022-07-29 NOTE — PROGRESS NOTES
Chart review for overdue eye exam screening. Efax sent to Dr. Galaviz in an effort to obtain updated eye exam report

## 2022-07-29 NOTE — LETTER
AUTHORIZATION FOR RELEASE OF   CONFIDENTIAL INFORMATION    Dear Meliton Tyson,    We are seeing Meliton Tyson, date of birth 1965, in the clinic at [facility]. Rodrick Angulo MD is the patient's PCP. Meliton Tyson has an outstanding lab/procedure at the time we reviewed his chart. In order to help keep his health information updated, he has authorized us to request the following medical record(s):        (  )  MAMMOGRAM                                      (  )  COLONOSCOPY      (  )  PAP SMEAR                                          (  )  OUTSIDE LAB RESULTS     (  )  DEXA SCAN                                          ( X )  DIABETIC EYE EXAM            (  )  FOOT EXAM                                          (  )  ENTIRE RECORD     (  )  OUTSIDE IMMUNIZATIONS                 (  )  _______________         Please fax records to [facility], Christopher J Wormuth, MD, [phone]     If you have any questions, please contact Stacy Bhakta at [phone].           Patient Name: Meliton Tyson  : 1965  Patient Phone #: [phone]

## 2022-08-11 NOTE — PROGRESS NOTES
"  INPATIENT REHAB FOLLOW-UP  [facility]    Chief Complaint   Patient presents with    Piedad Dietrich, *  ENCOUNTER DATE: 08/19/2022      HPI: Meliton Tyson is a 57 y.o. male who presents today for follow-up for evaluation of therapy services after completion of inpatient rehabilitation due to  CVA.  Mr. Meliton Tyson is a 57 y.o. male with past medical history of hypertension, diabetes mellitus, CVA with RSW weakness from June 2021 who was admitted to [facility] who presented to the ED for new onset of RSW and suspected angioedema for approximately 1 week and presented to [facility] on 7/7. MRI Brain demonstrated an acute right pontine infarct. Neurology consulted and patient was treated with aspirin, plavix, and statin with permissive hypertension. Course complicated with LE swelling with US demonstrating deep vein thrombosis involving the left femoral and popliteal veins as well as posterior calf veins. Cardiology consulted from thrombolysis but patient elected for anticoagulation only with xarelto. He was readmitted to inpatient rehabilitation on 7/17/2021 for ongoing rehabilitation and made slow progress until he developed shortness of breath and desaturation to 70s requiring 2-3 LPM and tachycardia to 120s. He was sent back to the ED on 8/4/2011 for shortness of breath. COVID19 rapid screen to be negative. Found to have bilateral pulmonary thromboembolism involving lobar branches and distal branches as directed. EKG with no ST-T changes, ECHO with no RV strain. Not a candidate for tPA. He was started on Therapeutic lovenox and weaned off oxygen. He was stepped down from ICU. He was restarted on flomax for urinary retention and determined to be "mouth breather" and recommended again for outpatient sleep study for MELONY.     He has completed OP PT/OT.  Mostly ambulatory with walker.  He has some problems with long distance.  Otherwise, speech intelligibility has continued to improve.  He has " some difficulty with close left three lateral fingers and some hypertension of this digits.  Otherwise, doing well overall. He also has some problems with clonus during activity. He is not wearing AFO and appears to be able to clear as long as he as walker.  Remains on apixabn for DVT.  Referred for eval for spasticity.     Denies falls.    Denies pain.    Therapy:  PT, OT and SLP.      Past Medical History:   Diagnosis Date    Diabetes mellitus     Hypertension     Stroke      No past surgical history on file.  Current Outpatient Medications on File Prior to Visit   Medication Sig Dispense Refill    amLODIPine (NORVASC) 10 MG tablet Take 1 tablet (10 mg total) by mouth once daily. 30 tablet 1    atorvastatin (LIPITOR) 80 MG tablet Take 1 tablet (80 mg total) by mouth once daily. 30 tablet 11    carvediloL (COREG) 6.25 MG tablet Take 1 tablet (6.25 mg total) by mouth 2 (two) times daily with meals. 180 tablet 1    CATAPRES-TTS-3 0.3 mg/24 hr 1 patch every 7 days.      ELIQUIS 5 mg Tab TAKE 2 TABLETS BY MOUTH TWICE DAILY FOR  2  DAYS  THEN  1  TABLET  TWICE  DAILY 60 tablet 2    EScitalopram oxalate (LEXAPRO) 5 MG Tab Take 1 tablet by mouth once daily 90 tablet 3    lactulose (CHRONULAC) 20 gram/30 mL Soln Take 15 mLs (10 g total) by mouth 3 (three) times daily. (Patient taking differently: Take 10 g by mouth 3 (three) times daily. Patient takes PRN) 4050 mL 3    losartan (COZAAR) 25 MG tablet Take 25 mg by mouth once daily. Patient takes 25 MG WITH 50 MG TO EQUAL 75 MG QD      losartan (COZAAR) 50 MG tablet TAKE 1 & 1/2 (ONE & ONE-HALF) TABLETS BY MOUTH ONCE DAILY 135 tablet 1    pantoprazole (PROTONIX) 40 MG tablet Take 1 tablet by mouth once daily 90 tablet 3    senna-docusate 8.6-50 mg (PERICOLACE) 8.6-50 mg per tablet Take 1 tablet by mouth 2 (two) times a day.       No current facility-administered medications on file prior to visit.     Review of patient's allergies indicates:  No Known  "Allergies    Family History:   Family History   Problem Relation Age of Onset    Asthma Mother     Diabetes Father     Hypertension Father         Social History: Lives with wife in a house.        Barthel Index:     Feeding: Independent(10)    Bathing Needs assistance(0)  Grooming Needs assistance(0)  Dressing Can do 50% (5)  Bowel  Independent  (10)  Bladder Independent  (10)  Toilet Use Partial assistance (5)  Mobility: Walks with one person assistance, > 150 feet (10)   Transfers Verbal/physical assistance (10)   Stairs  Need verbal, physical, assistance (5)             Tobacco:  denies   ETOH:  denies   Other drug use: denies        Review of Systems   All other systems reviewed and are negative.       Pertinent Prior Work Up (Imaging/EMGs):    MRI (7/8/2021):  Acute pontine infarct lateralizing to the RIGHT     CTA (8/4/2021):       1. Bilateral pulmonary thromboembolism involving lobar branches and distal branches as described.  2. Please see above for additional findings.         Physical Exam  BP (!) 159/108   Pulse 81   Ht 5' 7" (1.702 m)   Wt 95.7 kg (211 lb)   BMI 33.05 kg/m²   Physical Exam  Constitutional:       Appearance: Normal appearance.   HENT:      Head: Normocephalic and atraumatic.      Nose: Nose normal.      Mouth/Throat:      Mouth: Mucous membranes are moist.      Pharynx: Oropharynx is clear.   Eyes:      Extraocular Movements: Extraocular movements intact.      Pupils: Pupils are equal, round, and reactive to light.   Cardiovascular:      Rate and Rhythm: Normal rate and regular rhythm.      Pulses: Normal pulses.      Heart sounds: Normal heart sounds.   Pulmonary:      Effort: Pulmonary effort is normal.      Breath sounds: Normal breath sounds.   Abdominal:      General: Abdomen is flat. Bowel sounds are normal.      Palpations: Abdomen is soft.   Neurological:      Mental Status: He is alert and oriented to person, place, and time.      Cranial Nerves: Cranial nerves 2-12 are " intact.      Motor: Abnormal muscle tone present.      Gait: Gait is intact.           Impression: 57 y.o. male     Diagnoses and all orders for this visit:    Spastic hemiparesis  -     Ambulatory referral/consult to Physical Medicine Rehab      The patient is recovering very well after CVA.  He has very focal spasticity versus dystonia to the medial 3 fingers of the left hand.  He also has some nonsustained clonus when he ambulates during therapy.  Given the fact that he has very focal areas of hypertonicity and this prevents him from performing longer on more community level activities, we can treat these spastic elements with low-dose Botox.  Mostly to treat the medial aspect of the FDS and the left soleus muscle.  I would only recommend 2 treatments of Botox.  I do not anticipate long-term treatment for this patient.  We will have him back in 4-6 weeks for treatment.    The patient has been evaluated and examined today for deficits of Spastic hemiparesis or hemiplegia due to CVA.     I will submit a request for 100 units of botulinum toxin for chemodenervation to assist with improvement of Stretching/splinting, Gait and Self-care.    Follow up: 4-6 weeks      Ta Castrejon MD

## 2022-08-16 ENCOUNTER — OFFICE VISIT (OUTPATIENT)
Dept: CARDIOLOGY | Facility: CLINIC | Age: 57
End: 2022-08-16
Payer: MEDICAID

## 2022-08-16 ENCOUNTER — OFFICE VISIT (OUTPATIENT)
Dept: PHYSICAL MEDICINE AND REHAB | Facility: CLINIC | Age: 57
End: 2022-08-16
Payer: MEDICAID

## 2022-08-16 VITALS
WEIGHT: 211 LBS | SYSTOLIC BLOOD PRESSURE: 159 MMHG | HEIGHT: 67 IN | BODY MASS INDEX: 33.12 KG/M2 | HEART RATE: 81 BPM | DIASTOLIC BLOOD PRESSURE: 108 MMHG

## 2022-08-16 VITALS
WEIGHT: 211.19 LBS | BODY MASS INDEX: 33.15 KG/M2 | SYSTOLIC BLOOD PRESSURE: 141 MMHG | HEART RATE: 92 BPM | DIASTOLIC BLOOD PRESSURE: 87 MMHG | HEIGHT: 67 IN | OXYGEN SATURATION: 97 %

## 2022-08-16 DIAGNOSIS — G81.10 SPASTIC HEMIPARESIS: ICD-10-CM

## 2022-08-16 DIAGNOSIS — I10 ESSENTIAL HYPERTENSION: ICD-10-CM

## 2022-08-16 DIAGNOSIS — R07.89 OTHER CHEST PAIN: ICD-10-CM

## 2022-08-16 DIAGNOSIS — R93.1 ABNORMAL ECHOCARDIOGRAM: ICD-10-CM

## 2022-08-16 PROCEDURE — 99999 PR PBB SHADOW E&M-EST. PATIENT-LVL IV: ICD-10-PCS | Mod: PBBFAC,,, | Performed by: NURSE PRACTITIONER

## 2022-08-16 PROCEDURE — 3079F DIAST BP 80-89 MM HG: CPT | Mod: CPTII,,, | Performed by: NURSE PRACTITIONER

## 2022-08-16 PROCEDURE — 99213 OFFICE O/P EST LOW 20 MIN: CPT | Mod: S$PBB,,, | Performed by: NURSE PRACTITIONER

## 2022-08-16 PROCEDURE — 3080F DIAST BP >= 90 MM HG: CPT | Mod: CPTII,,, | Performed by: PHYSICAL MEDICINE & REHABILITATION

## 2022-08-16 PROCEDURE — 3077F SYST BP >= 140 MM HG: CPT | Mod: CPTII,,, | Performed by: NURSE PRACTITIONER

## 2022-08-16 PROCEDURE — 4010F ACE/ARB THERAPY RXD/TAKEN: CPT | Mod: CPTII,,, | Performed by: PHYSICAL MEDICINE & REHABILITATION

## 2022-08-16 PROCEDURE — 99213 OFFICE O/P EST LOW 20 MIN: CPT | Mod: PBBFAC,25,27 | Performed by: PHYSICAL MEDICINE & REHABILITATION

## 2022-08-16 PROCEDURE — 1159F PR MEDICATION LIST DOCUMENTED IN MEDICAL RECORD: ICD-10-PCS | Mod: CPTII,,, | Performed by: NURSE PRACTITIONER

## 2022-08-16 PROCEDURE — 1159F MED LIST DOCD IN RCRD: CPT | Mod: CPTII,,, | Performed by: NURSE PRACTITIONER

## 2022-08-16 PROCEDURE — 93010 EKG 12-LEAD: ICD-10-PCS | Mod: S$PBB,,, | Performed by: INTERNAL MEDICINE

## 2022-08-16 PROCEDURE — 3008F PR BODY MASS INDEX (BMI) DOCUMENTED: ICD-10-PCS | Mod: CPTII,,, | Performed by: PHYSICAL MEDICINE & REHABILITATION

## 2022-08-16 PROCEDURE — 99999 PR PBB SHADOW E&M-EST. PATIENT-LVL III: ICD-10-PCS | Mod: PBBFAC,,, | Performed by: PHYSICAL MEDICINE & REHABILITATION

## 2022-08-16 PROCEDURE — 99214 OFFICE O/P EST MOD 30 MIN: CPT | Mod: PBBFAC,25,PN | Performed by: NURSE PRACTITIONER

## 2022-08-16 PROCEDURE — 4010F PR ACE/ARB THEARPY RXD/TAKEN: ICD-10-PCS | Mod: CPTII,,, | Performed by: PHYSICAL MEDICINE & REHABILITATION

## 2022-08-16 PROCEDURE — 93010 ELECTROCARDIOGRAM REPORT: CPT | Mod: S$PBB,,, | Performed by: INTERNAL MEDICINE

## 2022-08-16 PROCEDURE — 3046F HEMOGLOBIN A1C LEVEL >9.0%: CPT | Mod: CPTII,,, | Performed by: PHYSICAL MEDICINE & REHABILITATION

## 2022-08-16 PROCEDURE — 1160F PR REVIEW ALL MEDS BY PRESCRIBER/CLIN PHARMACIST DOCUMENTED: ICD-10-PCS | Mod: CPTII,,, | Performed by: NURSE PRACTITIONER

## 2022-08-16 PROCEDURE — 3046F HEMOGLOBIN A1C LEVEL >9.0%: CPT | Mod: CPTII,,, | Performed by: NURSE PRACTITIONER

## 2022-08-16 PROCEDURE — 3008F BODY MASS INDEX DOCD: CPT | Mod: CPTII,,, | Performed by: PHYSICAL MEDICINE & REHABILITATION

## 2022-08-16 PROCEDURE — 93005 ELECTROCARDIOGRAM TRACING: CPT | Mod: PBBFAC,PN | Performed by: INTERNAL MEDICINE

## 2022-08-16 PROCEDURE — 1160F RVW MEDS BY RX/DR IN RCRD: CPT | Mod: CPTII,,, | Performed by: NURSE PRACTITIONER

## 2022-08-16 PROCEDURE — 99999 PR PBB SHADOW E&M-EST. PATIENT-LVL III: CPT | Mod: PBBFAC,,, | Performed by: PHYSICAL MEDICINE & REHABILITATION

## 2022-08-16 PROCEDURE — 3077F PR MOST RECENT SYSTOLIC BLOOD PRESSURE >= 140 MM HG: ICD-10-PCS | Mod: CPTII,,, | Performed by: NURSE PRACTITIONER

## 2022-08-16 PROCEDURE — 4010F PR ACE/ARB THEARPY RXD/TAKEN: ICD-10-PCS | Mod: CPTII,,, | Performed by: NURSE PRACTITIONER

## 2022-08-16 PROCEDURE — 3080F PR MOST RECENT DIASTOLIC BLOOD PRESSURE >= 90 MM HG: ICD-10-PCS | Mod: CPTII,,, | Performed by: PHYSICAL MEDICINE & REHABILITATION

## 2022-08-16 PROCEDURE — 99215 PR OFFICE/OUTPT VISIT, EST, LEVL V, 40-54 MIN: ICD-10-PCS | Mod: S$PBB,,, | Performed by: PHYSICAL MEDICINE & REHABILITATION

## 2022-08-16 PROCEDURE — 99213 PR OFFICE/OUTPT VISIT, EST, LEVL III, 20-29 MIN: ICD-10-PCS | Mod: S$PBB,,, | Performed by: NURSE PRACTITIONER

## 2022-08-16 PROCEDURE — 1159F PR MEDICATION LIST DOCUMENTED IN MEDICAL RECORD: ICD-10-PCS | Mod: CPTII,,, | Performed by: PHYSICAL MEDICINE & REHABILITATION

## 2022-08-16 PROCEDURE — 99215 OFFICE O/P EST HI 40 MIN: CPT | Mod: S$PBB,,, | Performed by: PHYSICAL MEDICINE & REHABILITATION

## 2022-08-16 PROCEDURE — 99999 PR PBB SHADOW E&M-EST. PATIENT-LVL IV: CPT | Mod: PBBFAC,,, | Performed by: NURSE PRACTITIONER

## 2022-08-16 PROCEDURE — 3077F SYST BP >= 140 MM HG: CPT | Mod: CPTII,,, | Performed by: PHYSICAL MEDICINE & REHABILITATION

## 2022-08-16 PROCEDURE — 3077F PR MOST RECENT SYSTOLIC BLOOD PRESSURE >= 140 MM HG: ICD-10-PCS | Mod: CPTII,,, | Performed by: PHYSICAL MEDICINE & REHABILITATION

## 2022-08-16 PROCEDURE — 3008F BODY MASS INDEX DOCD: CPT | Mod: CPTII,,, | Performed by: NURSE PRACTITIONER

## 2022-08-16 PROCEDURE — 3008F PR BODY MASS INDEX (BMI) DOCUMENTED: ICD-10-PCS | Mod: CPTII,,, | Performed by: NURSE PRACTITIONER

## 2022-08-16 PROCEDURE — 3079F PR MOST RECENT DIASTOLIC BLOOD PRESSURE 80-89 MM HG: ICD-10-PCS | Mod: CPTII,,, | Performed by: NURSE PRACTITIONER

## 2022-08-16 PROCEDURE — 3046F PR MOST RECENT HEMOGLOBIN A1C LEVEL > 9.0%: ICD-10-PCS | Mod: CPTII,,, | Performed by: NURSE PRACTITIONER

## 2022-08-16 PROCEDURE — 3046F PR MOST RECENT HEMOGLOBIN A1C LEVEL > 9.0%: ICD-10-PCS | Mod: CPTII,,, | Performed by: PHYSICAL MEDICINE & REHABILITATION

## 2022-08-16 PROCEDURE — 4010F ACE/ARB THERAPY RXD/TAKEN: CPT | Mod: CPTII,,, | Performed by: NURSE PRACTITIONER

## 2022-08-16 PROCEDURE — 1159F MED LIST DOCD IN RCRD: CPT | Mod: CPTII,,, | Performed by: PHYSICAL MEDICINE & REHABILITATION

## 2022-08-16 RX ORDER — LACTULOSE 10 G/15ML
SOLUTION ORAL
COMMUNITY
Start: 2022-07-14 | End: 2022-08-24 | Stop reason: SDUPTHER

## 2022-08-16 NOTE — PROGRESS NOTES
Cardiology    8/22/2022  4:10 PM    Problem list  Patient Active Problem List   Diagnosis    Type 2 diabetes mellitus, without long-term current use of insulin    Acute ischemic stroke    Dyslipidemia    Weakness    Class 2 obesity in adult    Essential hypertension    Stage 3 chronic kidney disease    Stroke    Slow transit constipation    Acute deep vein thrombosis (DVT) of femoral vein of left lower extremity    Acute pulmonary embolism    Cerebrovascular accident (CVA) due to embolism    Bilateral pulmonary embolism    Urinary retention    Suspected sleep apnea    Dysarthria    Right pontine stroke    Abnormal echocardiogram       CC:  Results review    HPI:  Unable to get stress testing on treadmill.  Feeling ok overall. Still having some trouble increasing activity.    Medications  Current Outpatient Medications   Medication Sig Dispense Refill    amLODIPine (NORVASC) 10 MG tablet Take 1 tablet (10 mg total) by mouth once daily. 30 tablet 1    atorvastatin (LIPITOR) 80 MG tablet Take 1 tablet (80 mg total) by mouth once daily. 30 tablet 11    carvediloL (COREG) 6.25 MG tablet Take 1 tablet (6.25 mg total) by mouth 2 (two) times daily with meals. 180 tablet 1    CONSTULOSE 10 gram/15 mL solution TAKE 15 ML BY MOUTH THREE TIMES DAILY      ELIQUIS 5 mg Tab TAKE 2 TABLETS BY MOUTH TWICE DAILY FOR  2  DAYS  THEN  1  TABLET  TWICE  DAILY 60 tablet 2    EScitalopram oxalate (LEXAPRO) 5 MG Tab Take 1 tablet by mouth once daily 90 tablet 3    losartan (COZAAR) 25 MG tablet Take 25 mg by mouth once daily. Patient takes 25 MG WITH 50 MG TO EQUAL 75 MG QD      losartan (COZAAR) 50 MG tablet TAKE 1 & 1/2 (ONE & ONE-HALF) TABLETS BY MOUTH ONCE DAILY 135 tablet 1    pantoprazole (PROTONIX) 40 MG tablet Take 1 tablet by mouth once daily 90 tablet 3    senna-docusate 8.6-50 mg (PERICOLACE) 8.6-50 mg per tablet Take 1 tablet by mouth 2 (two) times a day.      CATAPRES-TTS-3 0.3 mg/24 hr 1  patch every 7 days.      lactulose (CHRONULAC) 20 gram/30 mL Soln Take 15 mLs (10 g total) by mouth 3 (three) times daily. (Patient taking differently: Take 10 g by mouth 3 (three) times daily. Patient takes PRN) 4050 mL 3     No current facility-administered medications for this visit.      Prior to Admission medications    Medication Sig Start Date End Date Taking? Authorizing Provider   amLODIPine (NORVASC) 10 MG tablet Take 1 tablet (10 mg total) by mouth once daily. 12/3/21 12/3/22 Yes Rodrick Angulo MD   atorvastatin (LIPITOR) 80 MG tablet Take 1 tablet (80 mg total) by mouth once daily. 4/19/22 4/19/23 Yes Rodrick Angulo MD   carvediloL (COREG) 6.25 MG tablet Take 1 tablet (6.25 mg total) by mouth 2 (two) times daily with meals. 4/19/22  Yes Rodrick Angulo MD   CONSTULOSE 10 gram/15 mL solution TAKE 15 ML BY MOUTH THREE TIMES DAILY 7/14/22  Yes Historical Provider   ELIQUIS 5 mg Tab TAKE 2 TABLETS BY MOUTH TWICE DAILY FOR  2  DAYS  THEN  1  TABLET  TWICE  DAILY 7/11/22  Yes Rodrick Angulo MD   EScitalopram oxalate (LEXAPRO) 5 MG Tab Take 1 tablet by mouth once daily 2/2/22  Yes Rodrick Angulo MD   losartan (COZAAR) 25 MG tablet Take 25 mg by mouth once daily. Patient takes 25 MG WITH 50 MG TO EQUAL 75 MG QD 6/26/22  Yes Historical Provider   losartan (COZAAR) 50 MG tablet TAKE 1 & 1/2 (ONE & ONE-HALF) TABLETS BY MOUTH ONCE DAILY 2/23/22  Yes Rodrick Angulo MD   pantoprazole (PROTONIX) 40 MG tablet Take 1 tablet by mouth once daily 2/2/22  Yes Rodrick Angulo MD   senna-docusate 8.6-50 mg (PERICOLACE) 8.6-50 mg per tablet Take 1 tablet by mouth 2 (two) times a day.   Yes Historical Provider   CATAPRES-TTS-3 0.3 mg/24 hr 1 patch every 7 days. 6/27/22   Historical Provider   lactulose (CHRONULAC) 20 gram/30 mL Soln Take 15 mLs (10 g total) by mouth 3 (three) times daily.  Patient taking differently: Take 10 g by mouth 3 (three) times daily. Patient  takes PRN 1/20/22 1/20/23  Rodrick Angulo MD         History  Past Medical History:   Diagnosis Date    Diabetes mellitus     Hypertension     Stroke      No past surgical history on file.  Social History     Socioeconomic History    Marital status:    Tobacco Use    Smoking status: Never Smoker    Smokeless tobacco: Never Used   Substance and Sexual Activity    Alcohol use: No    Drug use: No         Allergies  Review of patient's allergies indicates:  No Known Allergies      Review of Systems   Review of Systems   Constitutional: Negative for diaphoresis and malaise/fatigue.   HENT: Negative.    Cardiovascular: Positive for dyspnea on exertion. Negative for chest pain, claudication, irregular heartbeat, leg swelling, near-syncope, orthopnea, palpitations, paroxysmal nocturnal dyspnea and syncope.   Respiratory: Negative for shortness of breath.    Endocrine: Negative for polydipsia, polyphagia and polyuria.   Hematologic/Lymphatic: Does not bruise/bleed easily.   Gastrointestinal: Negative for bloating, nausea and vomiting.   Genitourinary: Negative.    Neurological: Positive for weakness. Negative for excessive daytime sleepiness, dizziness, light-headedness and loss of balance.   Psychiatric/Behavioral: The patient is not nervous/anxious.    Allergic/Immunologic: Negative.          Physical Exam  Wt Readings from Last 1 Encounters:   08/16/22 95.7 kg (211 lb)     BP Readings from Last 3 Encounters:   08/16/22 (!) 159/108   08/16/22 (!) 141/87   07/19/22 (!) 160/96     Pulse Readings from Last 1 Encounters:   08/16/22 81     Body mass index is 33.08 kg/m².    Physical Exam  Vitals and nursing note reviewed.   Constitutional:       Appearance: Normal appearance.      Comments: In wheelchair   HENT:      Head: Normocephalic and atraumatic.      Mouth/Throat:      Mouth: Mucous membranes are moist.   Eyes:      Pupils: Pupils are equal, round, and reactive to light.   Cardiovascular:       Rate and Rhythm: Normal rate and regular rhythm.      Pulses:           Radial pulses are 2+ on the right side and 2+ on the left side.        Dorsalis pedis pulses are 2+ on the right side and 2+ on the left side.        Posterior tibial pulses are 2+ on the right side and 2+ on the left side.      Heart sounds: No murmur heard.  Pulmonary:      Effort: Pulmonary effort is normal. No respiratory distress.      Breath sounds: Normal breath sounds.   Abdominal:      General: There is no distension.      Tenderness: There is no abdominal tenderness.   Musculoskeletal:      Cervical back: Normal range of motion.      Right lower leg: No edema.      Left lower leg: No edema.   Skin:     General: Skin is warm and dry.      Findings: No erythema.   Neurological:      General: No focal deficit present.      Mental Status: He is alert.      Motor: Weakness present.   Psychiatric:         Mood and Affect: Mood normal.         Behavior: Behavior normal.                           Cardiology    8/22/2022  9:53 AM    Problem list  Patient Active Problem List   Diagnosis    Type 2 diabetes mellitus, without long-term current use of insulin    Acute ischemic stroke    Dyslipidemia    Weakness    Class 2 obesity in adult    Essential hypertension    Stage 3 chronic kidney disease    Stroke    Slow transit constipation    Acute deep vein thrombosis (DVT) of femoral vein of left lower extremity    Acute pulmonary embolism    Cerebrovascular accident (CVA) due to embolism    Bilateral pulmonary embolism    Urinary retention    Suspected sleep apnea    Dysarthria    Right pontine stroke    Abnormal echocardiogram       CC:  Abnormal echo    HPI:  Had 2 strokes last summer- Went to rehab 6/3/21 for a month, 7/6/21 had a CVA and was in rehab for 3 months  Had a PE and went back into the hospital 8/2021, then went back to rehab for several weeks after    No heart problems in the family that he knows of  Uses walker for  short distances, has been able to increase some of his independence with ADLs  No Matthew or chest pain that he can say  Uses a band on his foot because of a blood clot in the past.  As a result his feet swell sometimes. No orthopnea  Checks BPs at home, sometimes it is elevated. BPs sometimes 120s, 130s, sometimes 160s    Referred to us for an abnormal stress echo, but chart review reveals that he had an abnormal echo without stress testing in August 2021.  The echo showed a wall motion abnormality            Medications  Current Outpatient Medications   Medication Sig Dispense Refill    amLODIPine (NORVASC) 10 MG tablet Take 1 tablet (10 mg total) by mouth once daily. 30 tablet 1    atorvastatin (LIPITOR) 80 MG tablet Take 1 tablet (80 mg total) by mouth once daily. 30 tablet 11    carvediloL (COREG) 6.25 MG tablet Take 1 tablet (6.25 mg total) by mouth 2 (two) times daily with meals. 180 tablet 1    CONSTULOSE 10 gram/15 mL solution TAKE 15 ML BY MOUTH THREE TIMES DAILY      ELIQUIS 5 mg Tab TAKE 2 TABLETS BY MOUTH TWICE DAILY FOR  2  DAYS  THEN  1  TABLET  TWICE  DAILY 60 tablet 2    EScitalopram oxalate (LEXAPRO) 5 MG Tab Take 1 tablet by mouth once daily 90 tablet 3    losartan (COZAAR) 25 MG tablet Take 25 mg by mouth once daily. Patient takes 25 MG WITH 50 MG TO EQUAL 75 MG QD      losartan (COZAAR) 50 MG tablet TAKE 1 & 1/2 (ONE & ONE-HALF) TABLETS BY MOUTH ONCE DAILY 135 tablet 1    pantoprazole (PROTONIX) 40 MG tablet Take 1 tablet by mouth once daily 90 tablet 3    senna-docusate 8.6-50 mg (PERICOLACE) 8.6-50 mg per tablet Take 1 tablet by mouth 2 (two) times a day.      CATAPRES-TTS-3 0.3 mg/24 hr 1 patch every 7 days.      lactulose (CHRONULAC) 20 gram/30 mL Soln Take 15 mLs (10 g total) by mouth 3 (three) times daily. (Patient taking differently: Take 10 g by mouth 3 (three) times daily. Patient takes PRN) 4050 mL 3     No current facility-administered medications for this visit.      Prior to  Admission medications    Medication Sig Start Date End Date Taking? Authorizing Provider   amLODIPine (NORVASC) 10 MG tablet Take 1 tablet (10 mg total) by mouth once daily. 12/3/21 12/3/22 Yes Rodrick Angulo MD   atorvastatin (LIPITOR) 80 MG tablet Take 1 tablet (80 mg total) by mouth once daily. 4/19/22 4/19/23 Yes Rodrick Angulo MD   carvediloL (COREG) 6.25 MG tablet Take 1 tablet (6.25 mg total) by mouth 2 (two) times daily with meals. 4/19/22  Yes Rodrick Angulo MD   ELIQUIS 5 mg Tab TAKE 2 TABLETS BY MOUTH TWICE DAILY FOR  2  DAYS  THEN  1  TABLET  TWICE  DAILY 7/11/22  Yes Rodrick Angulo MD   EScitalopram oxalate (LEXAPRO) 5 MG Tab Take 1 tablet by mouth once daily 2/2/22  Yes Rodrick Angulo MD   lactulose (CHRONULAC) 20 gram/30 mL Soln Take 15 mLs (10 g total) by mouth 3 (three) times daily. 1/20/22 1/20/23 Yes Rodrick Angulo MD   losartan (COZAAR) 25 MG tablet Take 25 mg by mouth once daily. 6/26/22  Yes Historical Provider   losartan (COZAAR) 50 MG tablet TAKE 1 & 1/2 (ONE & ONE-HALF) TABLETS BY MOUTH ONCE DAILY 2/23/22  Yes Rodrick Angulo MD   pantoprazole (PROTONIX) 40 MG tablet Take 1 tablet by mouth once daily 2/2/22  Yes Rodrick Angulo MD   senna-docusate 8.6-50 mg (PERICOLACE) 8.6-50 mg per tablet Take 1 tablet by mouth 2 (two) times a day.   Yes Historical Provider   CATAPRES-TTS-3 0.3 mg/24 hr 1 patch every 7 days. 6/27/22   Historical Provider   melatonin 1 mg Tab Take 1 mg by mouth every evening.  7/19/22  Historical Provider   tamsulosin (FLOMAX) 0.4 mg Cap TAKE 2 CAPSULES BY MOUTH ONCE DAILY AFTER BREAKFAST 2/2/22 7/19/22  Rodrick Angulo MD         History  Past Medical History:   Diagnosis Date    Diabetes mellitus     Hypertension     Stroke      History reviewed. No pertinent surgical history.  Social History     Socioeconomic History    Marital status:    Tobacco Use    Smoking status: Never Smoker     Smokeless tobacco: Never Used   Substance and Sexual Activity    Alcohol use: No    Drug use: No         Allergies  Review of patient's allergies indicates:  No Known Allergies      Review of Systems   Review of Systems   Constitutional: Negative for diaphoresis and malaise/fatigue.   HENT: Negative.    Cardiovascular: Positive for dyspnea on exertion. Negative for chest pain, claudication, irregular heartbeat, leg swelling, near-syncope, orthopnea, palpitations, paroxysmal nocturnal dyspnea and syncope.   Respiratory: Negative for shortness of breath.    Endocrine: Negative for polydipsia, polyphagia and polyuria.   Hematologic/Lymphatic: Does not bruise/bleed easily.   Musculoskeletal: Positive for stiffness.   Gastrointestinal: Negative for bloating, nausea and vomiting.   Genitourinary: Negative.    Neurological: Positive for loss of balance. Negative for excessive daytime sleepiness, dizziness, light-headedness and weakness.   Psychiatric/Behavioral: The patient is not nervous/anxious.    Allergic/Immunologic: Negative.          Physical Exam  Wt Readings from Last 1 Encounters:   08/16/22 95.7 kg (211 lb)     BP Readings from Last 3 Encounters:   08/16/22 (!) 159/108   08/16/22 (!) 141/87   07/19/22 (!) 160/96     Pulse Readings from Last 1 Encounters:   08/16/22 81     Body mass index is 33.08 kg/m².    Physical Exam  Vitals and nursing note reviewed.   Constitutional:       Appearance: Normal appearance.      Comments: In wheelchair   HENT:      Head: Normocephalic and atraumatic.      Mouth/Throat:      Mouth: Mucous membranes are moist.   Eyes:      Pupils: Pupils are equal, round, and reactive to light.   Cardiovascular:      Rate and Rhythm: Normal rate and regular rhythm.      Pulses:           Radial pulses are 2+ on the right side and 2+ on the left side.        Dorsalis pedis pulses are 2+ on the right side and 2+ on the left side.        Posterior tibial pulses are 2+ on the right side and 2+  on the left side.      Heart sounds: No murmur heard.  Pulmonary:      Effort: Pulmonary effort is normal. No respiratory distress.      Breath sounds: Normal breath sounds.   Abdominal:      General: There is no distension.      Tenderness: There is no abdominal tenderness.   Musculoskeletal:      Cervical back: Normal range of motion.      Right lower leg: No edema.      Left lower leg: No edema.   Skin:     General: Skin is warm and dry.      Findings: No erythema.   Neurological:      General: No focal deficit present.      Mental Status: He is alert.   Psychiatric:         Mood and Affect: Mood normal.         Behavior: Behavior normal.       Problem List Items Addressed This Visit        Cardiac/Vascular    Essential hypertension    Overview     BP elevated today  Missed some doses of meds today  monitor           Abnormal echocardiogram    Overview     Wall motion abnormality seen in August 2021, some breathlessness with activity, unclear if anginal equivalent  Repeat echo  EKG difficult to read today due to artifact  Recommend stress test as he does have risk factors for CVD (HTN, DM)  Unable to do treadmill, await juliana           Current Assessment & Plan     As above             Other Visit Diagnoses     Other chest pain        Relevant Orders    IN OFFICE EKG 12-LEAD (to Muse) (Completed)    NM Myocardial Perfusion Spect Multi Pharmacologic    Nuclear Stress Test          D dimer negative, discussed if stopping DOAC would be appropriate.  We will discuss more at NOV              Follow Up  4-6 wks      @Aide Metzger DNP

## 2022-08-23 NOTE — PROGRESS NOTES
[facility]    Subjective:       Patient ID: Meliton Tyson is a 57 y.o. male.    Chief Complaint: Diabetes      History was obtained from the patient and supplemented through chart review.  This patient is followed by a colleague but is new to me.    Wife present in visit    HPI:    Patient is a 57 y.o. male w/ pmhx of CVA x 2, HTN, DM, HLD who presents to f/u on his diabetes and HTN due to inability to get refills.    Lost 50 lbs in 2021 when in SNF, regained weight at home    HTN   ?controlled  High today but didn't take meds  States also out of meds due to inability to get refills due to not having appt  amlodipine, Coreg, clonidine and losartan.     DM  7/19/2022 A1C 9.1  Sept 2021 A1C 5.7  Referred to diabetes education, but no med changes  Taken off of metformin a year ago, restart metformin  Agreed to go to DM education    Diet:  Bagel, lots of cereal daily  Wondering if lactulose is contributing because  V-8 fruit/veggie Juice every 2-3 days  Pizza, whole wheat/no sugar bread, pasta  No cakes, no cookies, no pastries, no candy, no chocolates  Needs eye exam Ms. Galaviz, will schedule himself   Recheck A1C prior to next visit    Hx of CVA  He is on Lipitor and metformin which he is tolerating well.      Depression  Considering stopping   Taking lexapro 5 mg, will take off med list    Constipation:   Has taken miralax before, metamucil. Didn't work  Taking lactulose and constulose    Going to [employer], can walk with walker    Wt Readings from Last 15 Encounters:   08/24/22 94.2 kg (207 lb 10.8 oz)   08/16/22 95.7 kg (211 lb)   08/16/22 95.8 kg (211 lb 3.2 oz)   07/19/22 85.7 kg (189 lb)   06/15/22 86 kg (189 lb 9.5 oz)   01/20/22 86 kg (189 lb 7.8 oz)   09/20/21 81.2 kg (179 lb)   08/10/21 86.8 kg (191 lb 5.8 oz)   07/16/21 94.1 kg (207 lb 7.3 oz)   07/07/21 96.6 kg (213 lb)   07/07/21 96 kg (211 lb 10.3 oz)   07/01/21 96.7 kg (213 lb 3 oz)   06/03/21 105.8 kg (233 lb 4 oz)    06/04/21 105.7 kg (233 lb)   11/04/19 106 kg (233 lb 11 oz)        Medical History  Past Medical History:   Diagnosis Date    Diabetes mellitus     Hypertension     Stroke        Review of Systems   Constitutional: Negative for appetite change and diaphoresis.   HENT: Positive for congestion. Negative for trouble swallowing.    Respiratory: Negative for shortness of breath.    Cardiovascular: Negative for chest pain.   Gastrointestinal: Positive for constipation. Negative for diarrhea, nausea and vomiting.        Gerd   Musculoskeletal: Positive for gait problem.   Neurological: Negative for dizziness and seizures.   Psychiatric/Behavioral: Negative for hallucinations.         Surgical hx, family hx, social hx   Have been reviewed      Current Outpatient Medications:     amLODIPine (NORVASC) 10 MG tablet, Take 1 tablet (10 mg total) by mouth once daily., Disp: 30 tablet, Rfl: 1    atorvastatin (LIPITOR) 80 MG tablet, Take 1 tablet (80 mg total) by mouth once daily., Disp: 30 tablet, Rfl: 11    carvediloL (COREG) 6.25 MG tablet, Take 1 tablet (6.25 mg total) by mouth 2 (two) times daily with meals., Disp: 180 tablet, Rfl: 1    ELIQUIS 5 mg Tab, TAKE 2 TABLETS BY MOUTH TWICE DAILY FOR  2  DAYS  THEN  1  TABLET  TWICE  DAILY, Disp: 60 tablet, Rfl: 2    pantoprazole (PROTONIX) 40 MG tablet, Take 1 tablet by mouth once daily, Disp: 90 tablet, Rfl: 3    senna-docusate 8.6-50 mg (PERICOLACE) 8.6-50 mg per tablet, Take 1 tablet by mouth 2 (two) times a day., Disp: , Rfl:     CATAPRES-TTS-3 0.3 mg/24 hr, Place 1 patch onto the skin every 7 days., Disp: 4 patch, Rfl: 11    cetirizine (ZYRTEC) 10 MG tablet, Take 1 tablet (10 mg total) by mouth once daily., Disp: 90 tablet, Rfl: 3    CONSTULOSE 10 gram/15 mL solution, TAKE 15 ML BY MOUTH THREE TIMES DAILY, Disp: 473 mL, Rfl: 0    fluticasone propionate (FLONASE) 50 mcg/actuation nasal spray, 1 spray (50 mcg total) by Each Nostril route once daily., Disp: 18.2 mL,  "Rfl: 11    losartan (COZAAR) 25 MG tablet, Take 1 tablet (25 mg total) by mouth once daily. Patient takes 25 MG WITH 50 MG TO EQUAL 75 MG QD, Disp: 90 tablet, Rfl: 1    losartan (COZAAR) 50 MG tablet, Take 1.5 tablets (75 mg total) by mouth once daily., Disp: 90 tablet, Rfl: 1    metFORMIN (GLUCOPHAGE) 500 MG tablet, Take 1 tablet (500 mg total) by mouth 2 (two) times daily with meals for 14 days, THEN 2 tablets (1,000 mg total) 2 (two) times daily with meals., Disp: 388 tablet, Rfl: 0  No current facility-administered medications for this visit.    Objective:        Body mass index is 32.53 kg/m².  Vitals:    08/24/22 1151   BP: (!) 148/110   Pulse: 94   SpO2: 97%   Weight: 94.2 kg (207 lb 10.8 oz)   Height: 5' 7" (1.702 m)   PainSc: 0-No pain     Physical Exam  Vitals and nursing note reviewed.   Constitutional:       General: He is not in acute distress.     Appearance: Normal appearance. He is not ill-appearing.      Comments: In wheel chair   HENT:      Head: Normocephalic and atraumatic.      Nose:      Comments: Wearing a mask  Eyes:      General: No scleral icterus.  Cardiovascular:      Rate and Rhythm: Normal rate and regular rhythm.      Heart sounds: Normal heart sounds.   Pulmonary:      Effort: Pulmonary effort is normal.   Abdominal:      General: There is no distension.   Musculoskeletal:         General: No deformity.      Cervical back: Normal range of motion.      Comments: Low muscle mass in lower legs   Skin:     General: Skin is warm and dry.   Neurological:      Mental Status: He is alert and oriented to person, place, and time.   Psychiatric:         Behavior: Behavior normal.      Comments: Laughing frequently, as nonsequitor through visit           Lab Results   Component Value Date    WBC 6.69 07/26/2022    HGB 15.4 07/26/2022    HCT 46.0 07/26/2022     07/26/2022    CHOL 125 07/19/2022    TRIG 161 (H) 07/19/2022    HDL 30 (L) 07/19/2022    ALT 36 07/26/2022    AST 18 07/26/2022 "     07/26/2022    K 4.0 07/26/2022     07/26/2022    CREATININE 1.3 07/26/2022    BUN 13 07/26/2022    CO2 26 07/26/2022    TSH 0.978 07/07/2021    INR 1.3 (H) 08/04/2021    HGBA1C 9.1 (H) 07/19/2022       The ASCVD Risk score (Flor ANTONY Jr., et al., 2013) failed to calculate for the following reasons:    The patient has a prior MI or stroke diagnosis    (Imaging have been independently reviewed)    Assessment:         1. Type 2 diabetes mellitus with stage 3 chronic kidney disease, without long-term current use of insulin, unspecified whether stage 3a or 3b CKD    2. Bilateral pulmonary embolism    3. Other acute pulmonary embolism with acute cor pulmonale    4. Essential hypertension    5. Stage 3 chronic kidney disease, unspecified whether stage 3a or 3b CKD    6. Constipation, unspecified constipation type    7. Nasal congestion          Plan:     Meliton was seen today for diabetes.    Diagnoses and all orders for this visit:    Type 2 diabetes mellitus with stage 3 chronic kidney disease, without long-term current use of insulin, unspecified whether stage 3a or 3b CKD  -     Hemoglobin A1C; Future  -     metFORMIN (GLUCOPHAGE) 500 MG tablet; Take 1 tablet (500 mg total) by mouth 2 (two) times daily with meals for 14 days, THEN 2 tablets (1,000 mg total) 2 (two) times daily with meals.    Bilateral pulmonary embolism    Other acute pulmonary embolism with acute cor pulmonale    Essential hypertension  -     CATAPRES-TTS-3 0.3 mg/24 hr; Place 1 patch onto the skin every 7 days.  -     losartan (COZAAR) 50 MG tablet; Take 1.5 tablets (75 mg total) by mouth once daily.  -     losartan (COZAAR) 25 MG tablet; Take 1 tablet (25 mg total) by mouth once daily. Patient takes 25 MG WITH 50 MG TO EQUAL 75 MG QD    Stage 3 chronic kidney disease, unspecified whether stage 3a or 3b CKD    Constipation, unspecified constipation type  -     CONSTULOSE 10 gram/15 mL solution; TAKE 15 ML BY MOUTH THREE TIMES  DAILY    Nasal congestion  -     fluticasone propionate (FLONASE) 50 mcg/actuation nasal spray; 1 spray (50 mcg total) by Each Nostril route once daily.  -     cetirizine (ZYRTEC) 10 MG tablet; Take 1 tablet (10 mg total) by mouth once daily.        Health Maintenance  - Lipids: low HDL 7/19/2022  - A1C: 9.1 7/19/2022  - Colon Ca Screen: FIT test June 2022  - Immunizations: declines immunizations when asked     DM  - Eye exam: will schedule himself with Ms. Galaviz, outside [facility]  - Foot exam:  utd  - Statin if DM: on statin  - Microalbum/creatine:  - Ace-I if diabetic, evidence of microalbuminuria and no contraindications:    Follow up in 3 months (on 11/24/2022) for htn follow-up. or sooner prn      69 min were used in chart review, evaluation and counseling of patient, discussion with wife, his PCP about care and coordination of care, documentation and review of results on same day of service     All medications were reviewed including potential side effects and risks/benefits.  Pt was counseled to call back if anything worsens or if questions arise.        Destin Schaffer MD  Family Medicine  [facility]  [address]  [place] [address]  Phone [phone]  Fax [phone]

## 2022-08-24 ENCOUNTER — OFFICE VISIT (OUTPATIENT)
Dept: INTERNAL MEDICINE | Facility: CLINIC | Age: 57
End: 2022-08-24
Payer: MEDICAID

## 2022-08-24 VITALS
HEART RATE: 94 BPM | BODY MASS INDEX: 32.6 KG/M2 | WEIGHT: 207.69 LBS | HEIGHT: 67 IN | DIASTOLIC BLOOD PRESSURE: 110 MMHG | SYSTOLIC BLOOD PRESSURE: 148 MMHG | OXYGEN SATURATION: 97 %

## 2022-08-24 DIAGNOSIS — R09.81 NASAL CONGESTION: ICD-10-CM

## 2022-08-24 DIAGNOSIS — K59.00 CONSTIPATION, UNSPECIFIED CONSTIPATION TYPE: ICD-10-CM

## 2022-08-24 DIAGNOSIS — I26.99 BILATERAL PULMONARY EMBOLISM: ICD-10-CM

## 2022-08-24 DIAGNOSIS — I26.09 OTHER ACUTE PULMONARY EMBOLISM WITH ACUTE COR PULMONALE: ICD-10-CM

## 2022-08-24 DIAGNOSIS — N18.30 TYPE 2 DIABETES MELLITUS WITH STAGE 3 CHRONIC KIDNEY DISEASE, WITHOUT LONG-TERM CURRENT USE OF INSULIN, UNSPECIFIED WHETHER STAGE 3A OR 3B CKD: Primary | ICD-10-CM

## 2022-08-24 DIAGNOSIS — N18.30 STAGE 3 CHRONIC KIDNEY DISEASE, UNSPECIFIED WHETHER STAGE 3A OR 3B CKD: ICD-10-CM

## 2022-08-24 DIAGNOSIS — I10 ESSENTIAL HYPERTENSION: ICD-10-CM

## 2022-08-24 DIAGNOSIS — E11.22 TYPE 2 DIABETES MELLITUS WITH STAGE 3 CHRONIC KIDNEY DISEASE, WITHOUT LONG-TERM CURRENT USE OF INSULIN, UNSPECIFIED WHETHER STAGE 3A OR 3B CKD: Primary | ICD-10-CM

## 2022-08-24 PROCEDURE — 1159F MED LIST DOCD IN RCRD: CPT | Mod: CPTII,,, | Performed by: STUDENT IN AN ORGANIZED HEALTH CARE EDUCATION/TRAINING PROGRAM

## 2022-08-24 PROCEDURE — 1159F PR MEDICATION LIST DOCUMENTED IN MEDICAL RECORD: ICD-10-PCS | Mod: CPTII,,, | Performed by: STUDENT IN AN ORGANIZED HEALTH CARE EDUCATION/TRAINING PROGRAM

## 2022-08-24 PROCEDURE — 99213 OFFICE O/P EST LOW 20 MIN: CPT | Mod: PBBFAC | Performed by: STUDENT IN AN ORGANIZED HEALTH CARE EDUCATION/TRAINING PROGRAM

## 2022-08-24 PROCEDURE — 3008F BODY MASS INDEX DOCD: CPT | Mod: CPTII,,, | Performed by: STUDENT IN AN ORGANIZED HEALTH CARE EDUCATION/TRAINING PROGRAM

## 2022-08-24 PROCEDURE — 99999 PR PBB SHADOW E&M-EST. PATIENT-LVL III: ICD-10-PCS | Mod: PBBFAC,,, | Performed by: STUDENT IN AN ORGANIZED HEALTH CARE EDUCATION/TRAINING PROGRAM

## 2022-08-24 PROCEDURE — 3008F PR BODY MASS INDEX (BMI) DOCUMENTED: ICD-10-PCS | Mod: CPTII,,, | Performed by: STUDENT IN AN ORGANIZED HEALTH CARE EDUCATION/TRAINING PROGRAM

## 2022-08-24 PROCEDURE — 4010F PR ACE/ARB THEARPY RXD/TAKEN: ICD-10-PCS | Mod: CPTII,,, | Performed by: STUDENT IN AN ORGANIZED HEALTH CARE EDUCATION/TRAINING PROGRAM

## 2022-08-24 PROCEDURE — 99999 PR PBB SHADOW E&M-EST. PATIENT-LVL III: CPT | Mod: PBBFAC,,, | Performed by: STUDENT IN AN ORGANIZED HEALTH CARE EDUCATION/TRAINING PROGRAM

## 2022-08-24 PROCEDURE — 99417 PR PROLONGED SVC, OUTPT, W/WO DIRECT PT CONTACT,  EA ADDTL 15 MIN: ICD-10-PCS | Mod: S$PBB,,, | Performed by: STUDENT IN AN ORGANIZED HEALTH CARE EDUCATION/TRAINING PROGRAM

## 2022-08-24 PROCEDURE — 3080F PR MOST RECENT DIASTOLIC BLOOD PRESSURE >= 90 MM HG: ICD-10-PCS | Mod: CPTII,,, | Performed by: STUDENT IN AN ORGANIZED HEALTH CARE EDUCATION/TRAINING PROGRAM

## 2022-08-24 PROCEDURE — 3046F PR MOST RECENT HEMOGLOBIN A1C LEVEL > 9.0%: ICD-10-PCS | Mod: CPTII,,, | Performed by: STUDENT IN AN ORGANIZED HEALTH CARE EDUCATION/TRAINING PROGRAM

## 2022-08-24 PROCEDURE — 3046F HEMOGLOBIN A1C LEVEL >9.0%: CPT | Mod: CPTII,,, | Performed by: STUDENT IN AN ORGANIZED HEALTH CARE EDUCATION/TRAINING PROGRAM

## 2022-08-24 PROCEDURE — 3077F SYST BP >= 140 MM HG: CPT | Mod: CPTII,,, | Performed by: STUDENT IN AN ORGANIZED HEALTH CARE EDUCATION/TRAINING PROGRAM

## 2022-08-24 PROCEDURE — 3077F PR MOST RECENT SYSTOLIC BLOOD PRESSURE >= 140 MM HG: ICD-10-PCS | Mod: CPTII,,, | Performed by: STUDENT IN AN ORGANIZED HEALTH CARE EDUCATION/TRAINING PROGRAM

## 2022-08-24 PROCEDURE — 99215 OFFICE O/P EST HI 40 MIN: CPT | Mod: S$PBB,,, | Performed by: STUDENT IN AN ORGANIZED HEALTH CARE EDUCATION/TRAINING PROGRAM

## 2022-08-24 PROCEDURE — 4010F ACE/ARB THERAPY RXD/TAKEN: CPT | Mod: CPTII,,, | Performed by: STUDENT IN AN ORGANIZED HEALTH CARE EDUCATION/TRAINING PROGRAM

## 2022-08-24 PROCEDURE — 99417 PROLNG OP E/M EACH 15 MIN: CPT | Mod: S$PBB,,, | Performed by: STUDENT IN AN ORGANIZED HEALTH CARE EDUCATION/TRAINING PROGRAM

## 2022-08-24 PROCEDURE — 3080F DIAST BP >= 90 MM HG: CPT | Mod: CPTII,,, | Performed by: STUDENT IN AN ORGANIZED HEALTH CARE EDUCATION/TRAINING PROGRAM

## 2022-08-24 PROCEDURE — 99215 PR OFFICE/OUTPT VISIT, EST, LEVL V, 40-54 MIN: ICD-10-PCS | Mod: S$PBB,,, | Performed by: STUDENT IN AN ORGANIZED HEALTH CARE EDUCATION/TRAINING PROGRAM

## 2022-08-24 RX ORDER — CLONIDINE 0.3 MG/D
1 PATCH TRANSDERMAL
Qty: 4 PATCH | Refills: 11 | Status: SHIPPED | OUTPATIENT
Start: 2022-08-24 | End: 2024-01-22

## 2022-08-24 RX ORDER — LOSARTAN POTASSIUM 25 MG/1
25 TABLET ORAL DAILY
Qty: 90 TABLET | Refills: 1 | Status: SHIPPED | OUTPATIENT
Start: 2022-08-24 | End: 2022-12-14

## 2022-08-24 RX ORDER — LACTULOSE 10 G/15ML
SOLUTION ORAL
Qty: 473 ML | Refills: 0 | Status: SHIPPED | OUTPATIENT
Start: 2022-08-24 | End: 2022-12-14

## 2022-08-24 RX ORDER — CETIRIZINE HYDROCHLORIDE 10 MG/1
10 TABLET ORAL DAILY
Qty: 90 TABLET | Refills: 3 | Status: SHIPPED | OUTPATIENT
Start: 2022-08-24 | End: 2023-08-28

## 2022-08-24 RX ORDER — FLUTICASONE PROPIONATE 50 MCG
1 SPRAY, SUSPENSION (ML) NASAL DAILY
Qty: 18.2 ML | Refills: 11 | Status: SHIPPED | OUTPATIENT
Start: 2022-08-24

## 2022-08-24 RX ORDER — LOSARTAN POTASSIUM 50 MG/1
75 TABLET ORAL DAILY
Qty: 90 TABLET | Refills: 1 | Status: SHIPPED | OUTPATIENT
Start: 2022-08-24 | End: 2022-12-14

## 2022-08-24 RX ORDER — METFORMIN HYDROCHLORIDE 500 MG/1
TABLET ORAL
Qty: 388 TABLET | Refills: 0 | Status: SHIPPED | OUTPATIENT
Start: 2022-08-24 | End: 2022-12-14

## 2022-08-30 ENCOUNTER — PATIENT MESSAGE (OUTPATIENT)
Dept: INTERNAL MEDICINE | Facility: CLINIC | Age: 57
End: 2022-08-30
Payer: MEDICAID

## 2022-08-31 ENCOUNTER — TELEPHONE (OUTPATIENT)
Dept: ADMINISTRATIVE | Facility: OTHER | Age: 57
End: 2022-08-31
Payer: MEDICAID

## 2022-08-31 ENCOUNTER — TELEPHONE (OUTPATIENT)
Dept: INTERNAL MEDICINE | Facility: CLINIC | Age: 57
End: 2022-08-31
Payer: MEDICAID

## 2022-08-31 NOTE — TELEPHONE ENCOUNTER
Called Genny Marbella to complete a remote BP check. States his machine is down and to call him back on Friday morning

## 2022-08-31 NOTE — TELEPHONE ENCOUNTER
Spoke to patient who declined rescheduling appointment of 9-12-22 with [employer] [facility]. Patient states he has done this once already, and does not want to reschedule.

## 2022-09-02 ENCOUNTER — CLINICAL SUPPORT (OUTPATIENT)
Dept: INTERNAL MEDICINE | Facility: CLINIC | Age: 57
End: 2022-09-02
Payer: MEDICAID

## 2022-09-02 ENCOUNTER — TELEPHONE (OUTPATIENT)
Dept: INTERNAL MEDICINE | Facility: CLINIC | Age: 57
End: 2022-09-02

## 2022-09-02 VITALS — DIASTOLIC BLOOD PRESSURE: 107 MMHG | SYSTOLIC BLOOD PRESSURE: 152 MMHG

## 2022-09-02 NOTE — PROGRESS NOTES
Meliton Tyson 57 y.o. male is here for Blood Pressure check. remote    Manual Blood pressure reading was  152/107, Pulse 75. (Checked at the end of the visit)  Patient states blood pressure    If high, was it repeated after 15 minutes? no    Diagnosed with Hypertension yes.    Patient took blood pressure medication today yes.  Last dose of blood pressure medication was taken at 0715am. Patient took Losartan 25 mg, Losartan 50 mg, Coreg 6.25 mg, Amlodipine 10 mg.     All Medications and OTC medication updated yes    Does patient have record of home blood pressure readings / Blood Pressure Log no.     Does the pt have any complaints today in regards to their blood pressure medication? no. Complains of NA. Patient is asymptomatic.     Were you sitting still for 5-10 minutes prior to taking your Blood pressure? yes     Has your blood pressure monitor ever been checked? no When was last time we checked your blood pressure monitor? NA    Updated vitals yes    Follow up date is 12/14/22 with Dr. Angulo.     Dr. Angulo notified.     Creatinine   Date Value Ref Range Status   07/26/2022 1.3 0.5 - 1.4 mg/dL Final     Sodium   Date Value Ref Range Status   07/26/2022 139 136 - 145 mmol/L Final     Potassium   Date Value Ref Range Status   07/26/2022 4.0 3.5 - 5.1 mmol/L Final

## 2022-09-08 ENCOUNTER — OFFICE VISIT (OUTPATIENT)
Dept: PODIATRY | Facility: CLINIC | Age: 57
End: 2022-09-08
Payer: MEDICAID

## 2022-09-08 VITALS
SYSTOLIC BLOOD PRESSURE: 144 MMHG | HEIGHT: 67 IN | WEIGHT: 207 LBS | BODY MASS INDEX: 32.49 KG/M2 | HEART RATE: 98 BPM | DIASTOLIC BLOOD PRESSURE: 98 MMHG

## 2022-09-08 DIAGNOSIS — Z79.01 LONG TERM CURRENT USE OF ANTICOAGULANT: ICD-10-CM

## 2022-09-08 DIAGNOSIS — L84 CALLUS OF HEEL: Primary | ICD-10-CM

## 2022-09-08 DIAGNOSIS — N18.30 TYPE 2 DIABETES MELLITUS WITH STAGE 3 CHRONIC KIDNEY DISEASE, WITHOUT LONG-TERM CURRENT USE OF INSULIN, UNSPECIFIED WHETHER STAGE 3A OR 3B CKD: ICD-10-CM

## 2022-09-08 DIAGNOSIS — L60.0 INGROWN NAIL: ICD-10-CM

## 2022-09-08 DIAGNOSIS — I69.354 HEMIPARESIS AFFECTING LEFT SIDE AS LATE EFFECT OF CEREBROVASCULAR ACCIDENT: ICD-10-CM

## 2022-09-08 DIAGNOSIS — Q84.5 ENLARGED AND HYPERTROPHIC NAILS: ICD-10-CM

## 2022-09-08 DIAGNOSIS — E11.22 TYPE 2 DIABETES MELLITUS WITH STAGE 3 CHRONIC KIDNEY DISEASE, WITHOUT LONG-TERM CURRENT USE OF INSULIN, UNSPECIFIED WHETHER STAGE 3A OR 3B CKD: ICD-10-CM

## 2022-09-08 DIAGNOSIS — I63.49 CEREBROVASCULAR ACCIDENT (CVA) DUE TO EMBOLISM OF OTHER CEREBRAL ARTERY: ICD-10-CM

## 2022-09-08 PROCEDURE — 3077F PR MOST RECENT SYSTOLIC BLOOD PRESSURE >= 140 MM HG: ICD-10-PCS | Mod: CPTII,,, | Performed by: PODIATRIST

## 2022-09-08 PROCEDURE — 3080F PR MOST RECENT DIASTOLIC BLOOD PRESSURE >= 90 MM HG: ICD-10-PCS | Mod: CPTII,,, | Performed by: PODIATRIST

## 2022-09-08 PROCEDURE — 1159F PR MEDICATION LIST DOCUMENTED IN MEDICAL RECORD: ICD-10-PCS | Mod: CPTII,,, | Performed by: PODIATRIST

## 2022-09-08 PROCEDURE — 11719 TRIM NAIL(S) ANY NUMBER: CPT | Mod: PBBFAC,PN | Performed by: PODIATRIST

## 2022-09-08 PROCEDURE — 3008F BODY MASS INDEX DOCD: CPT | Mod: CPTII,,, | Performed by: PODIATRIST

## 2022-09-08 PROCEDURE — 3046F HEMOGLOBIN A1C LEVEL >9.0%: CPT | Mod: CPTII,,, | Performed by: PODIATRIST

## 2022-09-08 PROCEDURE — 3046F PR MOST RECENT HEMOGLOBIN A1C LEVEL > 9.0%: ICD-10-PCS | Mod: CPTII,,, | Performed by: PODIATRIST

## 2022-09-08 PROCEDURE — 3008F PR BODY MASS INDEX (BMI) DOCUMENTED: ICD-10-PCS | Mod: CPTII,,, | Performed by: PODIATRIST

## 2022-09-08 PROCEDURE — 99203 PR OFFICE/OUTPT VISIT, NEW, LEVL III, 30-44 MIN: ICD-10-PCS | Mod: 25,S$PBB,, | Performed by: PODIATRIST

## 2022-09-08 PROCEDURE — 4010F ACE/ARB THERAPY RXD/TAKEN: CPT | Mod: CPTII,,, | Performed by: PODIATRIST

## 2022-09-08 PROCEDURE — 99999 PR PBB SHADOW E&M-EST. PATIENT-LVL IV: ICD-10-PCS | Mod: PBBFAC,,, | Performed by: PODIATRIST

## 2022-09-08 PROCEDURE — 1159F MED LIST DOCD IN RCRD: CPT | Mod: CPTII,,, | Performed by: PODIATRIST

## 2022-09-08 PROCEDURE — 99214 OFFICE O/P EST MOD 30 MIN: CPT | Mod: 25,PBBFAC,PN | Performed by: PODIATRIST

## 2022-09-08 PROCEDURE — 11719 TRIM NAIL(S) ANY NUMBER: CPT | Mod: S$PBB,,, | Performed by: PODIATRIST

## 2022-09-08 PROCEDURE — 99999 PR PBB SHADOW E&M-EST. PATIENT-LVL IV: CPT | Mod: PBBFAC,,, | Performed by: PODIATRIST

## 2022-09-08 PROCEDURE — 3077F SYST BP >= 140 MM HG: CPT | Mod: CPTII,,, | Performed by: PODIATRIST

## 2022-09-08 PROCEDURE — 99203 OFFICE O/P NEW LOW 30 MIN: CPT | Mod: 25,S$PBB,, | Performed by: PODIATRIST

## 2022-09-08 PROCEDURE — 4010F PR ACE/ARB THEARPY RXD/TAKEN: ICD-10-PCS | Mod: CPTII,,, | Performed by: PODIATRIST

## 2022-09-08 PROCEDURE — 11719 PR TRIM NAIL(S): ICD-10-PCS | Mod: S$PBB,,, | Performed by: PODIATRIST

## 2022-09-08 PROCEDURE — 3080F DIAST BP >= 90 MM HG: CPT | Mod: CPTII,,, | Performed by: PODIATRIST

## 2022-09-08 NOTE — PROGRESS NOTES
Subjective:      Patient ID: Meliton Tyson is a 57 y.o. male.    Chief Complaint: No chief complaint on file.    Patient comes in today accompanied by his wife who states that he has had blood clot heel a while ago - wants to make sure it is not ulcerating. Happened about 4-5 months ago. Hurts only when hit side of bed. Otherwise no pain.   Patient 's wife has difficulty with his toenails. Concerned about it.     He was in skilled nursing after CVA in July of 2021.  Affected his left arm and leg with residual weakness. Had another prior to that that affected his right side in June. Has had 2 PE.  States that he has uncontrolled frequent laughter since.   Ambulates usually with the assistance of a walker but comes in a wheelchair today    PCP: Destin Schaffer MD  DOL 8/24/22    Past Medical History:   Diagnosis Date    Diabetes mellitus     Hypertension     Stroke       Patient Active Problem List   Diagnosis    Type 2 diabetes mellitus, without long-term current use of insulin    Acute ischemic stroke    Dyslipidemia    Weakness    Class 2 obesity in adult    Essential hypertension    Stage 3 chronic kidney disease    Stroke    Slow transit constipation    Acute deep vein thrombosis (DVT) of femoral vein of left lower extremity    Acute pulmonary embolism    Cerebrovascular accident (CVA) due to embolism    Bilateral pulmonary embolism    Urinary retention    Suspected sleep apnea    Dysarthria    Right pontine stroke    Abnormal echocardiogram      Hemoglobin A1C   Date Value Ref Range Status   07/19/2022 9.1 (H) 4.0 - 5.6 % Final     Comment:     ADA Screening Guidelines:  5.7-6.4%  Consistent with prediabetes  >or=6.5%  Consistent with diabetes    High levels of fetal hemoglobin interfere with the HbA1C  assay. Heterozygous hemoglobin variants (HbS, HgC, etc)do  not significantly interfere with this assay.   However, presence of multiple variants may affect accuracy.     09/20/2021 5.7 (H) 4.0 - 5.6 % Final      Comment:     ADA Screening Guidelines:  5.7-6.4%  Consistent with prediabetes  >or=6.5%  Consistent with diabetes    High levels of fetal hemoglobin interfere with the HbA1C  assay. Heterozygous hemoglobin variants (HbS, HgC, etc)do  not significantly interfere with this assay.   However, presence of multiple variants may affect accuracy.     07/08/2021 10.0 (H) 4.0 - 5.6 % Final     Comment:     ADA Screening Guidelines:  5.7-6.4%  Consistent with prediabetes  >or=6.5%  Consistent with diabetes    High levels of fetal hemoglobin interfere with the HbA1C  assay. Heterozygous hemoglobin variants (HbS, HgC, etc)do  not significantly interfere with this assay.   However, presence of multiple variants may affect accuracy.         Objective:      Review of Systems   Constitutional: Positive for malaise/fatigue.   Cardiovascular:  Positive for leg swelling. Negative for claudication.   Skin:  Positive for color change, dry skin and suspicious lesions. Negative for itching, nail changes and rash.   Musculoskeletal:  Positive for muscle weakness. Negative for arthritis, joint pain and myalgias.   Neurological:  Positive for focal weakness, loss of balance, paresthesias and sensory change. Negative for numbness and weakness.   Psychiatric/Behavioral:  The patient is not nervous/anxious.    Physical Exam  Vitals reviewed.   Constitutional:       General: He is not in acute distress.     Appearance: He is well-developed. He is obese.   Cardiovascular:      Pulses:           Dorsalis pedis pulses are 2+ on the right side and 2+ on the left side.   Musculoskeletal:         General: No swelling, tenderness or signs of injury.      Right lower leg: Edema present.      Left lower leg: Edema present.      Right foot: Normal range of motion. No bunion or foot drop.      Left foot: Bunion present. No foot drop.        Feet:       Comments: Passive ROM of ankle and pedal joints is painless and without crepitation B/L.      Feet:       Right foot:      Skin integrity: Callus, dry skin and fissure present.      Toenail Condition: Right toenails are long and ingrown.      Left foot:      Skin integrity: Dry skin present. No callus or fissure.      Toenail Condition: Left toenails are long and ingrown.      Comments: Toenails 1st, 2nd, 3rd, 4th, 5th  B/L are hypertrophic, wide, with hyperkeratosis at the nail borders but no sign of acute concern .     Skin:     General: Skin is warm and dry.      Capillary Refill: Capillary refill takes 2 to 3 seconds.      Findings: Lesion (area of indicated blood clot is actually a hyperkeratotic area posterior lateral right heel) present. No bruising, erythema or rash.   Neurological:      Mental Status: He is alert and oriented to person, place, and time.      Sensory: Sensory deficit present.      Motor: Weakness present.      Coordination: Abnormal coordination: Walker/wheelchair.      Gait: Gait abnormal.   Psychiatric:         Mood and Affect: Mood normal.         Behavior: Behavior is slowed. Behavior is cooperative.         Assessment:      Encounter Diagnoses   Name Primary?    Type 2 diabetes mellitus with stage 3 chronic kidney disease, without long-term current use of insulin, unspecified whether stage 3a or 3b CKD     Cerebrovascular accident (CVA) due to embolism of other cerebral artery     Hemiparesis affecting left side as late effect of cerebrovascular accident     Long term current use of anticoagulant     Callus of heel Yes    Enlarged and hypertrophic nails     Ingrown nail          Problem List Items Addressed This Visit          Neuro    Cerebrovascular accident (CVA) due to embolism       Endocrine    Type 2 diabetes mellitus, without long-term current use of insulin     Other Visit Diagnoses       Callus of heel    -  Primary    Hemiparesis affecting left side as late effect of cerebrovascular accident        Long term current use of anticoagulant        Enlarged and hypertrophic nails         Ingrown nail               Plan:       Diagnoses and all orders for this visit:    Callus of heel    Type 2 diabetes mellitus with stage 3 chronic kidney disease, without long-term current use of insulin, unspecified whether stage 3a or 3b CKD  -     Ambulatory referral/consult to Podiatry    Cerebrovascular accident (CVA) due to embolism of other cerebral artery    Hemiparesis affecting left side as late effect of cerebrovascular accident    Long term current use of anticoagulant    Enlarged and hypertrophic nails    Ingrown nail  I counseled the patient on his conditions, their implications and medical management.    - Shoe inspection. Diabetic Foot Education. Patient reminded of the importance of good nutrition and blood sugar control to help prevent podiatric complications of diabetes. Patient instructed on proper foot hygeine. We discussed wearing proper shoe gear, daily foot inspections, never walking without protective shoe gear, annual diabetic foot exam, sooner p.r.n.    - With patient's permission, nails were aggressively reduced and debrided x 10 to their soft tissue attachment mechanically, removing all offending nail and debris. Patient relates relief following the procedure. He will continue to monitor the areas daily, inspect his feet, wear protective shoe gear when ambulatory, moisturizer to maintain skin integrity.    Dispensed Achilles gel sleeve for padding posterior lateral right heel; advised on use of OTC topical urea including flexitol heel balm/foot cream q.d. after shower.

## 2022-09-24 ENCOUNTER — PATIENT MESSAGE (OUTPATIENT)
Dept: PHYSICAL MEDICINE AND REHAB | Facility: CLINIC | Age: 57
End: 2022-09-24
Payer: MEDICAID

## 2022-10-27 ENCOUNTER — PATIENT OUTREACH (OUTPATIENT)
Dept: INTERNAL MEDICINE | Facility: CLINIC | Age: 57
End: 2022-10-27
Payer: MEDICAID

## 2022-11-01 ENCOUNTER — OFFICE VISIT (OUTPATIENT)
Dept: PHYSICAL MEDICINE AND REHAB | Facility: CLINIC | Age: 57
End: 2022-11-01
Payer: MEDICAID

## 2022-11-01 DIAGNOSIS — I63.50 RIGHT PONTINE STROKE: ICD-10-CM

## 2022-11-01 DIAGNOSIS — G81.10 SPASTIC HEMIPARESIS: Primary | ICD-10-CM

## 2022-11-01 PROCEDURE — 1160F RVW MEDS BY RX/DR IN RCRD: CPT | Mod: CPTII,,, | Performed by: PHYSICAL MEDICINE & REHABILITATION

## 2022-11-01 PROCEDURE — 95874 GUIDE NERV DESTR NEEDLE EMG: CPT | Mod: 26,S$PBB,, | Performed by: PHYSICAL MEDICINE & REHABILITATION

## 2022-11-01 PROCEDURE — 3046F HEMOGLOBIN A1C LEVEL >9.0%: CPT | Mod: CPTII,,, | Performed by: PHYSICAL MEDICINE & REHABILITATION

## 2022-11-01 PROCEDURE — 1159F MED LIST DOCD IN RCRD: CPT | Mod: CPTII,,, | Performed by: PHYSICAL MEDICINE & REHABILITATION

## 2022-11-01 PROCEDURE — 64642 CHEMODENERV 1 EXTREMITY 1-4: CPT | Mod: PBBFAC | Performed by: PHYSICAL MEDICINE & REHABILITATION

## 2022-11-01 PROCEDURE — 64643 CHEMODENERV 1 EXTREM 1-4 EA: CPT | Mod: S$PBB,,, | Performed by: PHYSICAL MEDICINE & REHABILITATION

## 2022-11-01 PROCEDURE — 99499 UNLISTED E&M SERVICE: CPT | Mod: S$PBB,,, | Performed by: PHYSICAL MEDICINE & REHABILITATION

## 2022-11-01 PROCEDURE — 64643 CHEMODENERV 1 EXTREM 1-4 EA: CPT | Mod: PBBFAC | Performed by: PHYSICAL MEDICINE & REHABILITATION

## 2022-11-01 PROCEDURE — 95874 GUIDE NERV DESTR NEEDLE EMG: CPT | Mod: PBBFAC | Performed by: PHYSICAL MEDICINE & REHABILITATION

## 2022-11-01 PROCEDURE — 99211 OFF/OP EST MAY X REQ PHY/QHP: CPT | Mod: PBBFAC | Performed by: PHYSICAL MEDICINE & REHABILITATION

## 2022-11-01 PROCEDURE — 4010F ACE/ARB THERAPY RXD/TAKEN: CPT | Mod: CPTII,,, | Performed by: PHYSICAL MEDICINE & REHABILITATION

## 2022-11-01 PROCEDURE — 95874 PR NEEDLE EMG GUIDANCE FOR CHEMODENERVATION: ICD-10-PCS | Mod: 26,S$PBB,, | Performed by: PHYSICAL MEDICINE & REHABILITATION

## 2022-11-01 PROCEDURE — 4010F PR ACE/ARB THEARPY RXD/TAKEN: ICD-10-PCS | Mod: CPTII,,, | Performed by: PHYSICAL MEDICINE & REHABILITATION

## 2022-11-01 PROCEDURE — 3046F PR MOST RECENT HEMOGLOBIN A1C LEVEL > 9.0%: ICD-10-PCS | Mod: CPTII,,, | Performed by: PHYSICAL MEDICINE & REHABILITATION

## 2022-11-01 PROCEDURE — 64643 PR CHEMODENERV 1 EXT; EA ADD'L EXT, 1-4 MUSCLE(S): ICD-10-PCS | Mod: S$PBB,,, | Performed by: PHYSICAL MEDICINE & REHABILITATION

## 2022-11-01 PROCEDURE — 99499 NO LOS: ICD-10-PCS | Mod: S$PBB,,, | Performed by: PHYSICAL MEDICINE & REHABILITATION

## 2022-11-01 PROCEDURE — 64642 PR CHEMODENERV ONE EXTREMITY; 1-4 MUSCLE(S): ICD-10-PCS | Mod: S$PBB,,, | Performed by: PHYSICAL MEDICINE & REHABILITATION

## 2022-11-01 PROCEDURE — 99999 PR PBB SHADOW E&M-EST. PATIENT-LVL I: ICD-10-PCS | Mod: PBBFAC,,, | Performed by: PHYSICAL MEDICINE & REHABILITATION

## 2022-11-01 PROCEDURE — 1159F PR MEDICATION LIST DOCUMENTED IN MEDICAL RECORD: ICD-10-PCS | Mod: CPTII,,, | Performed by: PHYSICAL MEDICINE & REHABILITATION

## 2022-11-01 PROCEDURE — 64642 CHEMODENERV 1 EXTREMITY 1-4: CPT | Mod: S$PBB,,, | Performed by: PHYSICAL MEDICINE & REHABILITATION

## 2022-11-01 PROCEDURE — 99999 PR PBB SHADOW E&M-EST. PATIENT-LVL I: CPT | Mod: PBBFAC,,, | Performed by: PHYSICAL MEDICINE & REHABILITATION

## 2022-11-01 PROCEDURE — 1160F PR REVIEW ALL MEDS BY PRESCRIBER/CLIN PHARMACIST DOCUMENTED: ICD-10-PCS | Mod: CPTII,,, | Performed by: PHYSICAL MEDICINE & REHABILITATION

## 2022-11-01 RX ADMIN — ONABOTULINUMTOXINA 200 UNITS: 100 INJECTION, POWDER, LYOPHILIZED, FOR SOLUTION INTRADERMAL; INTRAMUSCULAR at 04:11

## 2022-11-01 NOTE — PROGRESS NOTES
"CHEMODENERVATION CLINIC ENCOUNTER  [facility]  PROCEDURE    Chief Complaint   Patient presents with    Botulinum Toxin Injection     Ta Castrejon MD  DATE OF ENCOUNTER:11/01/2022      Etiology:   CVA  Impairment: Spastic hemiparesis or hemiplegia    History of Present Illness    Meliton Tyson is a 57 y.o. male who presents today for follow-up for evaluation of therapy services after completion of inpatient rehabilitation due to  CVA.  Mr. Meliton Tyson is a 57 y.o. male with past medical history of hypertension, diabetes mellitus, CVA with RSW weakness from June 2021 who was admitted to [facility] who presented to the ED for new onset of RSW and suspected angioedema for approximately 1 week and presented to [facility] on 7/7. MRI Brain demonstrated an acute right pontine infarct. Neurology consulted and patient was treated with aspirin, plavix, and statin with permissive hypertension. Course complicated with LE swelling with US demonstrating deep vein thrombosis involving the left femoral and popliteal veins as well as posterior calf veins. Cardiology consulted from thrombolysis but patient elected for anticoagulation only with xarelto. He was readmitted to inpatient rehabilitation on 7/17/2021 for ongoing rehabilitation and made slow progress until he developed shortness of breath and desaturation to 70s requiring 2-3 LPM and tachycardia to 120s. He was sent back to the ED on 8/4/2011 for shortness of breath. COVID19 rapid screen to be negative. Found to have bilateral pulmonary thromboembolism involving lobar branches and distal branches as directed. EKG with no ST-T changes, ECHO with no RV strain. Not a candidate for tPA. He was started on Therapeutic lovenox and weaned off oxygen. He was stepped down from ICU. He was restarted on flomax for urinary retention and determined to be "mouth breather" and recommended again for outpatient sleep study for MELONY.     He has completed OP PT/OT.  Mostly " ambulatory with walker.  He has some problems with long distance.  Otherwise, speech intelligibility has continued to improve.  He has some difficulty with close left three lateral fingers and some hypertension of this digits.  Otherwise, doing well overall. He also has some problems with clonus during activity. He is not wearing AFO and appears to be able to clear as long as he as walker.  Remains on apixabn for DVT.  Referred for eval for spasticity.        Medications:  None    Current therapy: None      Interval History/Previous Treatment:  Here for botox treatment.  No changes since the last procedure      Review of Systems   All other systems reviewed and are negative.    Physical Exam  Vitals and nursing note reviewed.   Constitutional:       Appearance: Normal appearance. He is obese.   HENT:      Head: Normocephalic and atraumatic.      Right Ear: Tympanic membrane normal.      Left Ear: Tympanic membrane normal.      Nose: Nose normal.      Mouth/Throat:      Mouth: Mucous membranes are moist.      Pharynx: Oropharynx is clear.   Eyes:      Extraocular Movements: Extraocular movements intact.      Conjunctiva/sclera: Conjunctivae normal.      Pupils: Pupils are equal, round, and reactive to light.   Cardiovascular:      Rate and Rhythm: Regular rhythm. Tachycardia present.      Pulses: Normal pulses.      Heart sounds: Normal heart sounds.   Pulmonary:      Effort: Pulmonary effort is normal.      Breath sounds: Normal breath sounds.   Abdominal:      General: Abdomen is flat. Bowel sounds are normal.      Palpations: Abdomen is soft.   Musculoskeletal:         General: No swelling or deformity. Normal range of motion.      Right lower leg: No edema.   Skin:     General: Skin is warm.      Capillary Refill: Capillary refill takes less than 2 seconds.   Neurological:      General: No focal deficit present.      Mental Status: He is alert and oriented to person, place, and time.      Cranial Nerves: No  cranial nerve deficit.      Motor: No weakness.      Gait: Gait normal.   Psychiatric:         Mood and Affect: Mood normal.       UE Spasticity Exam:  Shoulder abductors: 1  Elbow flexors: 1  Forearm pronators: 1  Wrist flexors: 1  Finger flexors: 1+  Thumb flexors: 1    LE Spasticity Exam:  Hip flexors: 1  Hip adductors: 1  Knee recurvatum: 1  Knee flexors: 1  Ankle plantar flexors: 1+  Ankle invertors: 1  Toe flexors: 1      IMAGING RESULTS:     MRI BRAIN WITHOUT CONTRAST (7/8/2021):     Acute pontine infarct lateralizing to the RIGHT     No significant arterial abnormalities.      Diagnoses and all orders for this visit:    Spastic hemiparesis  -     onabotulinumtoxina injection 200 Units    Right pontine stroke  -     onabotulinumtoxina injection 200 Units        Plan:  I have offered chemodenervation with botulinum toxin for spasticity related to CVA. The patient expressed understanding  would like to proceed.       Botox Procedure Note:    The potential risks were explained to the patient and HE consented to proceed.  After identifying the left side a 1cc syringe was prepared with 100u Botox reconstituted into 1cc N.S.  This represented 100u/cc.  Using a 27G EMG injection needle the following sites were injected using EMG guidance:    Flexor digitorum superficialis:  20cc (EMG activity low  Flexor digitorum profundis:  30cc (EMG activity high  Soleus: 50cc (EMG activity high    The patient tolerated the procedure well. There were no complications.   There were no complications.  The patient tolerated the procedure well.        LOT #: [id number]   EXP #: 11/2024       Medications:   We discussed the options for medication treatments:   No additional oral antispasmodics recommended after this encounter.    Referrals:   We discussed options for stretching/splinting/and bracing:  No additional interventions recommended after this encounter.    RTC: 100 units, 12 weeks

## 2022-11-02 ENCOUNTER — TELEPHONE (OUTPATIENT)
Dept: INTERNAL MEDICINE | Facility: CLINIC | Age: 57
End: 2022-11-02
Payer: MEDICAID

## 2022-11-02 NOTE — TELEPHONE ENCOUNTER
----- Message from Francine Mei sent at 11/2/2022  1:24 PM CDT -----  Name of Who is Calling: [facility] on behalf of MIREYA GREEN [[id number]]            What is the request in detail: Patient pharmacy is requesting call back to ask can they do the generic CATAPRES-TTS-3 0.3 mg/24 hr being it is not covered anymore              Can the clinic reply by [facility]: no              [phone] [facility]

## 2022-11-04 NOTE — TELEPHONE ENCOUNTER
Spoke with Etelvina at [facility] and informed her the generic for CATAPRES-TTS-3 0.3 mg/24 is ok per Dr Angulo

## 2022-11-16 ENCOUNTER — PATIENT OUTREACH (OUTPATIENT)
Dept: INTERNAL MEDICINE | Facility: CLINIC | Age: 57
End: 2022-11-16
Payer: MEDICAID

## 2022-11-18 ENCOUNTER — OFFICE VISIT (OUTPATIENT)
Dept: CARDIOLOGY | Facility: CLINIC | Age: 57
End: 2022-11-18
Payer: MEDICAID

## 2022-11-18 VITALS
OXYGEN SATURATION: 97 % | DIASTOLIC BLOOD PRESSURE: 93 MMHG | WEIGHT: 216.06 LBS | BODY MASS INDEX: 33.91 KG/M2 | HEIGHT: 67 IN | HEART RATE: 87 BPM | SYSTOLIC BLOOD PRESSURE: 156 MMHG

## 2022-11-18 DIAGNOSIS — I10 ESSENTIAL HYPERTENSION: ICD-10-CM

## 2022-11-18 DIAGNOSIS — R93.1 ABNORMAL ECHOCARDIOGRAM: ICD-10-CM

## 2022-11-18 PROCEDURE — 3077F SYST BP >= 140 MM HG: CPT | Mod: CPTII,,, | Performed by: NURSE PRACTITIONER

## 2022-11-18 PROCEDURE — 99999 PR PBB SHADOW E&M-EST. PATIENT-LVL IV: ICD-10-PCS | Mod: PBBFAC,,, | Performed by: NURSE PRACTITIONER

## 2022-11-18 PROCEDURE — 99999 PR PBB SHADOW E&M-EST. PATIENT-LVL IV: CPT | Mod: PBBFAC,,, | Performed by: NURSE PRACTITIONER

## 2022-11-18 PROCEDURE — 3008F PR BODY MASS INDEX (BMI) DOCUMENTED: ICD-10-PCS | Mod: CPTII,,, | Performed by: NURSE PRACTITIONER

## 2022-11-18 PROCEDURE — 3008F BODY MASS INDEX DOCD: CPT | Mod: CPTII,,, | Performed by: NURSE PRACTITIONER

## 2022-11-18 PROCEDURE — 4010F PR ACE/ARB THEARPY RXD/TAKEN: ICD-10-PCS | Mod: CPTII,,, | Performed by: NURSE PRACTITIONER

## 2022-11-18 PROCEDURE — 99212 PR OFFICE/OUTPT VISIT, EST, LEVL II, 10-19 MIN: ICD-10-PCS | Mod: S$PBB,,, | Performed by: NURSE PRACTITIONER

## 2022-11-18 PROCEDURE — 3080F DIAST BP >= 90 MM HG: CPT | Mod: CPTII,,, | Performed by: NURSE PRACTITIONER

## 2022-11-18 PROCEDURE — 3080F PR MOST RECENT DIASTOLIC BLOOD PRESSURE >= 90 MM HG: ICD-10-PCS | Mod: CPTII,,, | Performed by: NURSE PRACTITIONER

## 2022-11-18 PROCEDURE — 1159F PR MEDICATION LIST DOCUMENTED IN MEDICAL RECORD: ICD-10-PCS | Mod: CPTII,,, | Performed by: NURSE PRACTITIONER

## 2022-11-18 PROCEDURE — 1159F MED LIST DOCD IN RCRD: CPT | Mod: CPTII,,, | Performed by: NURSE PRACTITIONER

## 2022-11-18 PROCEDURE — 3046F HEMOGLOBIN A1C LEVEL >9.0%: CPT | Mod: CPTII,,, | Performed by: NURSE PRACTITIONER

## 2022-11-18 PROCEDURE — 99214 OFFICE O/P EST MOD 30 MIN: CPT | Mod: PBBFAC,PN | Performed by: NURSE PRACTITIONER

## 2022-11-18 PROCEDURE — 1160F RVW MEDS BY RX/DR IN RCRD: CPT | Mod: CPTII,,, | Performed by: NURSE PRACTITIONER

## 2022-11-18 PROCEDURE — 1160F PR REVIEW ALL MEDS BY PRESCRIBER/CLIN PHARMACIST DOCUMENTED: ICD-10-PCS | Mod: CPTII,,, | Performed by: NURSE PRACTITIONER

## 2022-11-18 PROCEDURE — 4010F ACE/ARB THERAPY RXD/TAKEN: CPT | Mod: CPTII,,, | Performed by: NURSE PRACTITIONER

## 2022-11-18 PROCEDURE — 99212 OFFICE O/P EST SF 10 MIN: CPT | Mod: S$PBB,,, | Performed by: NURSE PRACTITIONER

## 2022-11-18 PROCEDURE — 3077F PR MOST RECENT SYSTOLIC BLOOD PRESSURE >= 140 MM HG: ICD-10-PCS | Mod: CPTII,,, | Performed by: NURSE PRACTITIONER

## 2022-11-18 PROCEDURE — 3046F PR MOST RECENT HEMOGLOBIN A1C LEVEL > 9.0%: ICD-10-PCS | Mod: CPTII,,, | Performed by: NURSE PRACTITIONER

## 2022-11-18 RX ORDER — ESCITALOPRAM OXALATE 5 MG/1
5 TABLET ORAL DAILY
COMMUNITY
Start: 2022-11-14 | End: 2023-06-07 | Stop reason: SDUPTHER

## 2022-11-18 NOTE — PROGRESS NOTES
Cardiology    11/23/2022  10:46 AM    Problem list  Patient Active Problem List   Diagnosis    Type 2 diabetes mellitus, without long-term current use of insulin    Acute ischemic stroke    Dyslipidemia    Weakness    Class 2 obesity in adult    Essential hypertension    Stage 3 chronic kidney disease    Stroke    Slow transit constipation    Acute deep vein thrombosis (DVT) of femoral vein of left lower extremity    Acute pulmonary embolism    Cerebrovascular accident (CVA) due to embolism    Bilateral pulmonary embolism    Urinary retention    Suspected sleep apnea    Dysarthria    Right pontine stroke    Abnormal echocardiogram       CC:  Results review    HPI:  Feeling ok overall- no complaints. Some elevated BPs    Medications  Current Outpatient Medications   Medication Sig Dispense Refill    amLODIPine (NORVASC) 10 MG tablet Take 1 tablet (10 mg total) by mouth once daily. 30 tablet 3    atorvastatin (LIPITOR) 80 MG tablet Take 1 tablet (80 mg total) by mouth once daily. 30 tablet 11    carvediloL (COREG) 6.25 MG tablet Take 1 tablet (6.25 mg total) by mouth 2 (two) times daily with meals. 180 tablet 1    CATAPRES-TTS-3 0.3 mg/24 hr Place 1 patch onto the skin every 7 days. 4 patch 11    cetirizine (ZYRTEC) 10 MG tablet Take 1 tablet (10 mg total) by mouth once daily. 90 tablet 3    EScitalopram oxalate (LEXAPRO) 5 MG Tab Take 5 mg by mouth once daily.      fluticasone propionate (FLONASE) 50 mcg/actuation nasal spray 1 spray (50 mcg total) by Each Nostril route once daily. 18.2 mL 11    losartan (COZAAR) 25 MG tablet Take 1 tablet (25 mg total) by mouth once daily. Patient takes 25 MG WITH 50 MG TO EQUAL 75 MG QD 90 tablet 1    losartan (COZAAR) 50 MG tablet Take 1.5 tablets (75 mg total) by mouth once daily. 90 tablet 1    metFORMIN (GLUCOPHAGE) 500 MG tablet Take 1 tablet (500 mg total) by mouth 2 (two) times daily with meals for 14 days, THEN 2 tablets (1,000 mg total) 2 (two) times daily with  meals. 388 tablet 0    pantoprazole (PROTONIX) 40 MG tablet Take 1 tablet by mouth once daily 90 tablet 3    senna-docusate 8.6-50 mg (PERICOLACE) 8.6-50 mg per tablet Take 1 tablet by mouth 2 (two) times a day.      CONSTULOSE 10 gram/15 mL solution TAKE 15 ML BY MOUTH THREE TIMES DAILY 473 mL 0    ELIQUIS 5 mg Tab TAKE 2 TABLETS BY MOUTH TWICE DAILY FOR 2 DAYS, THEN 1 TABLET TWICE DAILY 60 tablet 0     No current facility-administered medications for this visit.      Prior to Admission medications    Medication Sig Start Date End Date Taking? Authorizing Provider   amLODIPine (NORVASC) 10 MG tablet Take 1 tablet (10 mg total) by mouth once daily. 9/6/22 9/6/23 Yes Rodrick Angulo MD   atorvastatin (LIPITOR) 80 MG tablet Take 1 tablet (80 mg total) by mouth once daily. 4/19/22 4/19/23 Yes Rodrick Angulo MD   carvediloL (COREG) 6.25 MG tablet Take 1 tablet (6.25 mg total) by mouth 2 (two) times daily with meals. 4/19/22  Yes Rodrick Angulo MD   CATAPRES-TTS-3 0.3 mg/24 hr Place 1 patch onto the skin every 7 days. 8/24/22  Yes Destin Schaffer MD   cetirizine (ZYRTEC) 10 MG tablet Take 1 tablet (10 mg total) by mouth once daily. 8/24/22 8/24/23 Yes Destin Schaffer MD   ELIQUIS 5 mg Tab TAKE 2 TABLETS BY MOUTH TWICE DAILY FOR  2  DAYS  THEN  1  TABLET  2  TIMES  A  DAY 10/10/22  Yes Rodrick Angulo MD   EScitalopram oxalate (LEXAPRO) 5 MG Tab Take 5 mg by mouth once daily. 11/14/22  Yes Historical Provider   fluticasone propionate (FLONASE) 50 mcg/actuation nasal spray 1 spray (50 mcg total) by Each Nostril route once daily. 8/24/22  Yes Destin Schaffer MD   losartan (COZAAR) 25 MG tablet Take 1 tablet (25 mg total) by mouth once daily. Patient takes 25 MG WITH 50 MG TO EQUAL 75 MG QD 8/24/22  Yes Destin Schaffer MD   losartan (COZAAR) 50 MG tablet Take 1.5 tablets (75 mg total) by mouth once daily. 8/24/22  Yes Destin Schaffer MD   metFORMIN (GLUCOPHAGE) 500  MG tablet Take 1 tablet (500 mg total) by mouth 2 (two) times daily with meals for 14 days, THEN 2 tablets (1,000 mg total) 2 (two) times daily with meals. 8/24/22 12/6/22 Yes Destin Schaffer MD   pantoprazole (PROTONIX) 40 MG tablet Take 1 tablet by mouth once daily 2/2/22  Yes Rodrick Angulo MD   senna-docusate 8.6-50 mg (PERICOLACE) 8.6-50 mg per tablet Take 1 tablet by mouth 2 (two) times a day.   Yes Historical Provider   CONSTULOSE 10 gram/15 mL solution TAKE 15 ML BY MOUTH THREE TIMES DAILY 8/24/22   Destin Schaffer MD         History  Past Medical History:   Diagnosis Date    Diabetes mellitus     Hypertension     Stroke      History reviewed. No pertinent surgical history.  Social History     Socioeconomic History    Marital status:    Tobacco Use    Smoking status: Never    Smokeless tobacco: Never   Substance and Sexual Activity    Alcohol use: No    Drug use: No         Allergies  Review of patient's allergies indicates:  No Known Allergies      Review of Systems   Review of Systems   Constitutional: Negative for diaphoresis and malaise/fatigue.   HENT: Negative.     Cardiovascular:  Negative for chest pain, claudication, dyspnea on exertion, irregular heartbeat, leg swelling, near-syncope, orthopnea, palpitations, paroxysmal nocturnal dyspnea and syncope.   Respiratory:  Negative for shortness of breath.    Endocrine: Negative for polydipsia, polyphagia and polyuria.   Hematologic/Lymphatic: Does not bruise/bleed easily.   Gastrointestinal:  Negative for bloating, nausea and vomiting.   Genitourinary: Negative.    Neurological:  Negative for excessive daytime sleepiness, dizziness, light-headedness, loss of balance and weakness.   Psychiatric/Behavioral:  The patient is not nervous/anxious.    Allergic/Immunologic: Negative.        Physical Exam  Wt Readings from Last 1 Encounters:   11/18/22 98 kg (216 lb 0.8 oz)     BP Readings from Last 3 Encounters:   11/18/22 (!) 156/93    09/08/22 (!) 144/98   09/02/22 (!) 152/107     Pulse Readings from Last 1 Encounters:   11/18/22 87     Body mass index is 33.84 kg/m².    Physical Exam  Vitals and nursing note reviewed.   Constitutional:       Appearance: Normal appearance.      Comments: In wheelchair   HENT:      Head: Normocephalic and atraumatic.      Mouth/Throat:      Mouth: Mucous membranes are moist.   Eyes:      Pupils: Pupils are equal, round, and reactive to light.   Cardiovascular:      Rate and Rhythm: Normal rate and regular rhythm.      Pulses:           Radial pulses are 2+ on the right side and 2+ on the left side.        Dorsalis pedis pulses are 2+ on the right side and 2+ on the left side.        Posterior tibial pulses are 2+ on the right side and 2+ on the left side.      Heart sounds: No murmur heard.  Pulmonary:      Effort: Pulmonary effort is normal. No respiratory distress.      Breath sounds: Normal breath sounds.   Abdominal:      General: There is no distension.      Tenderness: There is no abdominal tenderness.   Musculoskeletal:      Cervical back: Normal range of motion.      Right lower leg: No edema.      Left lower leg: No edema.   Skin:     General: Skin is warm and dry.      Findings: No erythema.   Neurological:      General: No focal deficit present.      Mental Status: He is alert.      Motor: Weakness present.   Psychiatric:         Mood and Affect: Mood normal.         Behavior: Behavior normal.               Problem List Items Addressed This Visit          Cardiac/Vascular    Essential hypertension    Overview     BP elevated today  Some missed doses of meds, recommend monitoring at home           Current Assessment & Plan     As above         Abnormal echocardiogram    Overview     Wall motion abnormality seen in August 2021, some breathlessness with activity, unclear if anginal equivalent  Repeat echo does not show any WMA  EKG difficult to read today due to artifact  Stress test negative for  ischemia           Current Assessment & Plan     As above              Follow Up  PRN      @Aide Metzger DNP

## 2022-11-18 NOTE — PATIENT INSTRUCTIONS
Check your blood pressures once a day over the next few days    We will contact you Monday to check in and see what they are like    Try to limit sodium in your diet- this is very important

## 2022-12-12 ENCOUNTER — PATIENT OUTREACH (OUTPATIENT)
Dept: INTERNAL MEDICINE | Facility: CLINIC | Age: 57
End: 2022-12-12
Payer: MEDICAID

## 2022-12-14 ENCOUNTER — OFFICE VISIT (OUTPATIENT)
Dept: INTERNAL MEDICINE | Facility: CLINIC | Age: 57
End: 2022-12-14
Attending: FAMILY MEDICINE
Payer: MEDICAID

## 2022-12-14 VITALS
BODY MASS INDEX: 33.94 KG/M2 | WEIGHT: 216.69 LBS | DIASTOLIC BLOOD PRESSURE: 88 MMHG | OXYGEN SATURATION: 95 % | SYSTOLIC BLOOD PRESSURE: 138 MMHG | HEART RATE: 91 BPM

## 2022-12-14 DIAGNOSIS — I26.99 BILATERAL PULMONARY EMBOLISM: ICD-10-CM

## 2022-12-14 DIAGNOSIS — I63.9 ACUTE ISCHEMIC STROKE: ICD-10-CM

## 2022-12-14 DIAGNOSIS — I10 ESSENTIAL HYPERTENSION: ICD-10-CM

## 2022-12-14 DIAGNOSIS — N18.30 TYPE 2 DIABETES MELLITUS WITH STAGE 3 CHRONIC KIDNEY DISEASE, WITHOUT LONG-TERM CURRENT USE OF INSULIN, UNSPECIFIED WHETHER STAGE 3A OR 3B CKD: Primary | ICD-10-CM

## 2022-12-14 DIAGNOSIS — E11.22 TYPE 2 DIABETES MELLITUS WITH STAGE 3 CHRONIC KIDNEY DISEASE, WITHOUT LONG-TERM CURRENT USE OF INSULIN, UNSPECIFIED WHETHER STAGE 3A OR 3B CKD: Primary | ICD-10-CM

## 2022-12-14 PROCEDURE — 99999 PR PBB SHADOW E&M-EST. PATIENT-LVL III: CPT | Mod: PBBFAC,,, | Performed by: FAMILY MEDICINE

## 2022-12-14 PROCEDURE — 3008F PR BODY MASS INDEX (BMI) DOCUMENTED: ICD-10-PCS | Mod: CPTII,,, | Performed by: FAMILY MEDICINE

## 2022-12-14 PROCEDURE — 1160F PR REVIEW ALL MEDS BY PRESCRIBER/CLIN PHARMACIST DOCUMENTED: ICD-10-PCS | Mod: CPTII,,, | Performed by: FAMILY MEDICINE

## 2022-12-14 PROCEDURE — 3075F PR MOST RECENT SYSTOLIC BLOOD PRESS GE 130-139MM HG: ICD-10-PCS | Mod: CPTII,,, | Performed by: FAMILY MEDICINE

## 2022-12-14 PROCEDURE — 3008F BODY MASS INDEX DOCD: CPT | Mod: CPTII,,, | Performed by: FAMILY MEDICINE

## 2022-12-14 PROCEDURE — 3075F SYST BP GE 130 - 139MM HG: CPT | Mod: CPTII,,, | Performed by: FAMILY MEDICINE

## 2022-12-14 PROCEDURE — 3044F HG A1C LEVEL LT 7.0%: CPT | Mod: CPTII,,, | Performed by: FAMILY MEDICINE

## 2022-12-14 PROCEDURE — 3079F PR MOST RECENT DIASTOLIC BLOOD PRESSURE 80-89 MM HG: ICD-10-PCS | Mod: CPTII,,, | Performed by: FAMILY MEDICINE

## 2022-12-14 PROCEDURE — 99214 PR OFFICE/OUTPT VISIT, EST, LEVL IV, 30-39 MIN: ICD-10-PCS | Mod: S$PBB,,, | Performed by: FAMILY MEDICINE

## 2022-12-14 PROCEDURE — 1159F PR MEDICATION LIST DOCUMENTED IN MEDICAL RECORD: ICD-10-PCS | Mod: CPTII,,, | Performed by: FAMILY MEDICINE

## 2022-12-14 PROCEDURE — 1159F MED LIST DOCD IN RCRD: CPT | Mod: CPTII,,, | Performed by: FAMILY MEDICINE

## 2022-12-14 PROCEDURE — 3079F DIAST BP 80-89 MM HG: CPT | Mod: CPTII,,, | Performed by: FAMILY MEDICINE

## 2022-12-14 PROCEDURE — 1160F RVW MEDS BY RX/DR IN RCRD: CPT | Mod: CPTII,,, | Performed by: FAMILY MEDICINE

## 2022-12-14 PROCEDURE — 4010F PR ACE/ARB THEARPY RXD/TAKEN: ICD-10-PCS | Mod: CPTII,,, | Performed by: FAMILY MEDICINE

## 2022-12-14 PROCEDURE — 99999 PR PBB SHADOW E&M-EST. PATIENT-LVL III: ICD-10-PCS | Mod: PBBFAC,,, | Performed by: FAMILY MEDICINE

## 2022-12-14 PROCEDURE — 99214 OFFICE O/P EST MOD 30 MIN: CPT | Mod: S$PBB,,, | Performed by: FAMILY MEDICINE

## 2022-12-14 PROCEDURE — 3044F PR MOST RECENT HEMOGLOBIN A1C LEVEL <7.0%: ICD-10-PCS | Mod: CPTII,,, | Performed by: FAMILY MEDICINE

## 2022-12-14 PROCEDURE — 4010F ACE/ARB THERAPY RXD/TAKEN: CPT | Mod: CPTII,,, | Performed by: FAMILY MEDICINE

## 2022-12-14 PROCEDURE — 99213 OFFICE O/P EST LOW 20 MIN: CPT | Mod: PBBFAC | Performed by: FAMILY MEDICINE

## 2022-12-14 RX ORDER — LOSARTAN POTASSIUM 25 MG/1
25 TABLET ORAL DAILY
Qty: 90 TABLET | Refills: 3 | Status: SHIPPED | OUTPATIENT
Start: 2022-12-14 | End: 2023-12-26

## 2022-12-14 RX ORDER — METFORMIN HYDROCHLORIDE 500 MG/1
1000 TABLET ORAL 2 TIMES DAILY WITH MEALS
Qty: 360 TABLET | Refills: 3 | Status: SHIPPED | OUTPATIENT
Start: 2022-12-14 | End: 2023-12-26

## 2022-12-14 RX ORDER — LOSARTAN POTASSIUM 50 MG/1
50 TABLET ORAL DAILY
Qty: 90 TABLET | Refills: 3 | Status: SHIPPED | OUTPATIENT
Start: 2022-12-14 | End: 2023-12-26

## 2022-12-14 NOTE — PROGRESS NOTES
CHIEF COMPLAINT:   followup for CVA X2 d/t uncontrolled hypertension, diabetes, and hypercholesterolemia.     HISTORY OF PRESENT ILLNESS: The patient is a 57 year-old black male. He was inpt and rehab for total of several months d/t CVA X2 and hyperglycemia.  Ultimately complicated with DVT and significant filomena PEs.    He has been having postprandial diarrhea with his metformin especially in the morning.    The patient has diabetes.  Recent blood sugars have been less than 200.  There have been no episodes of hypoglycemia.  Patient does routinely monitor their blood sugar.    He is on Lipitor and metformin which he is tolerating well.      He is on amlodipine, Coreg, clonidine and losartan.  Blood pressure is well controlled today.      REVIEW OF SYSTEMS:   GENERAL: No fever, chills, fatigability.   SKIN: No rashes, itching or changes in color or texture of skin.   HEAD: No headaches or recent head trauma.   EYES: Visual acuity fine. No photophobia, ocular pain or diplopia.   EARS: Denies ear pain, discharge.   NOSE: No loss of smell, no epistaxis or postnasal drip.   MOUTH & THROAT: No hoarseness or change in voice. No excessive gum bleeding.   NODES: Denies swollen glands.   CHEST: Denies GUARDADO, cyanosis, wheezing, and sputum production.   CARDIOVASCULAR: Denies chest pain, PND, orthopnea or reduced exercise tolerance.   ABDOMEN: Appetite fine. Denies diarrhea, abdominal pain, hematemesis or blood in stool.   URINARY: No flank pain, dysuria or hematuria.   PERIPHERAL VASCULAR: No claudication or cyanosis.   MUSCULOSKELETAL: No joint stiffness or swelling. Denies back pain.   NEUROLOGIC: No history of seizures, paralysis, alteration of gait or coordination.     SOCIAL HISTORY: The patient does not smoke. The patient consumes alcohol socially. The patient is . 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     PHYSICAL EXAMINATION:   Blood pressure 138/88, pulse 91, weight 98.3 kg (216 lb 11.4 oz), SpO2 95  %.    APPEARANCE: Well nourished, well developed, in no acute distress.   HEAD: Normocephalic, atraumatic.   EYES: PERRL. EOMI. Conjunctivae without injection and anicteric   NOSE: Mucosa pink. Airway clear.   MOUTH & THROAT: No tonsillar enlargement. No pharyngeal erythema or exudate. No stridor.   NECK: Supple.   NODES: No cervical, axillary or inguinal lymph node enlargement.   CHEST: Lungs clear to auscultation. No retractions are noted. No rales or rhonchi are present.   CARDIOVASCULAR: Normal S1, S2. No rubs, murmurs or gallops.   ABDOMEN: Bowel sounds normal. Not distended. Soft. No tenderness or masses. No ascites is noted.   MUSCULOSKELETAL: There is no clubbing, cyanosis, or edema of the extremities x4. There is full range of motion of the lumbar spine. There is full range of motion of the extremities x4. There is no deformity noted.   NEUROLOGIC:   Dysarthric speech development.   Hearing normal.   Hemiparetic gait.   Motor shows residual hemiparesis  sensory exam grossly normal.   PSYCHIATRIC: Patient is alert and oriented x3. Thought processes are all normal. There is no homicidality. There is no suicidality. There is no evidence of psychosis.     LABORATORY/RADIOLOGY: Chart reviewed, including notes and discharge summary     ASSESSMENT:   Chronic constipation  Hypertension   Type 2 diabetes, condition is well controlled on current medication regimen   Hyperlipidemia, condition is well controlled on current medication regimen    PLAN:    We will update an A1c in 6 months  Return to clinic in 6 months

## 2023-01-29 RX ORDER — PANTOPRAZOLE SODIUM 40 MG/1
TABLET, DELAYED RELEASE ORAL
Qty: 90 TABLET | Refills: 3 | Status: SHIPPED | OUTPATIENT
Start: 2023-01-29 | End: 2024-01-22 | Stop reason: SDUPTHER

## 2023-01-29 NOTE — TELEPHONE ENCOUNTER
Refill Decision Note   Meliton Tyson  is requesting a refill authorization.  Brief Assessment and Rationale for Refill:  Approve     Medication Therapy Plan:       Medication Reconciliation Completed: No   Comments:     No Care Gaps recommended.     Note composed:11:58 AM 01/29/2023

## 2023-01-29 NOTE — TELEPHONE ENCOUNTER
No new care gaps identified.  [facility] Embedded Care Gaps. Reference number: [id number]. 1/29/2023   10:51:59 AM CST

## 2023-03-24 ENCOUNTER — PATIENT MESSAGE (OUTPATIENT)
Dept: ADMINISTRATIVE | Facility: HOSPITAL | Age: 58
End: 2023-03-24
Payer: MEDICAID

## 2023-03-29 ENCOUNTER — OFFICE VISIT (OUTPATIENT)
Dept: PHYSICAL MEDICINE AND REHAB | Facility: CLINIC | Age: 58
End: 2023-03-29
Payer: MEDICAID

## 2023-03-29 DIAGNOSIS — I63.50 RIGHT PONTINE STROKE: ICD-10-CM

## 2023-03-29 DIAGNOSIS — G81.10 SPASTIC HEMIPARESIS: Primary | ICD-10-CM

## 2023-03-29 PROCEDURE — 99999 PR PBB SHADOW E&M-EST. PATIENT-LVL I: ICD-10-PCS | Mod: PBBFAC,,, | Performed by: PHYSICAL MEDICINE & REHABILITATION

## 2023-03-29 PROCEDURE — 99215 PR OFFICE/OUTPT VISIT, EST, LEVL V, 40-54 MIN: ICD-10-PCS | Mod: S$PBB,,, | Performed by: PHYSICAL MEDICINE & REHABILITATION

## 2023-03-29 PROCEDURE — 99215 OFFICE O/P EST HI 40 MIN: CPT | Mod: S$PBB,,, | Performed by: PHYSICAL MEDICINE & REHABILITATION

## 2023-03-29 PROCEDURE — 99211 OFF/OP EST MAY X REQ PHY/QHP: CPT | Mod: PBBFAC | Performed by: PHYSICAL MEDICINE & REHABILITATION

## 2023-03-29 PROCEDURE — 4010F ACE/ARB THERAPY RXD/TAKEN: CPT | Mod: CPTII,,, | Performed by: PHYSICAL MEDICINE & REHABILITATION

## 2023-03-29 PROCEDURE — 4010F PR ACE/ARB THEARPY RXD/TAKEN: ICD-10-PCS | Mod: CPTII,,, | Performed by: PHYSICAL MEDICINE & REHABILITATION

## 2023-03-29 PROCEDURE — 99999 PR PBB SHADOW E&M-EST. PATIENT-LVL I: CPT | Mod: PBBFAC,,, | Performed by: PHYSICAL MEDICINE & REHABILITATION

## 2023-03-29 NOTE — PROGRESS NOTES
"[facility] ENCOUNTER  [facility]  PROCEDURE    Chief Complaint   Patient presents with    Follow-up     Ta Castrejon MD  DATE OF ENCOUNTER:03/31/2023      Etiology:   CVA  Impairment: Spastic hemiparesis or hemiplegia    History of Present Illness    Meliton Tyson is a 57 y.o. male who presents today for follow-up for evaluation of therapy services after completion of inpatient rehabilitation due to  CVA.  Mr. Meliton Tyson is a 57 y.o. male with past medical history of hypertension, diabetes mellitus, CVA with RSW weakness from June 2021 who was admitted to [facility] who presented to the ED for new onset of RSW and suspected angioedema for approximately 1 week and presented to [facility] on 7/7. MRI Brain demonstrated an acute right pontine infarct. Neurology consulted and patient was treated with aspirin, plavix, and statin with permissive hypertension. Course complicated with LE swelling with US demonstrating deep vein thrombosis involving the left femoral and popliteal veins as well as posterior calf veins. Cardiology consulted from thrombolysis but patient elected for anticoagulation only with xarelto. He was readmitted to inpatient rehabilitation on 7/17/2021 for ongoing rehabilitation and made slow progress until he developed shortness of breath and desaturation to 70s requiring 2-3 LPM and tachycardia to 120s. He was sent back to the ED on 8/4/2011 for shortness of breath. COVID19 rapid screen to be negative. Found to have bilateral pulmonary thromboembolism involving lobar branches and distal branches as directed. EKG with no ST-T changes, ECHO with no RV strain. Not a candidate for tPA. He was started on Therapeutic lovenox and weaned off oxygen. He was stepped down from ICU. He was restarted on flomax for urinary retention and determined to be "mouth breather" and recommended again for outpatient sleep study for MELONY.     He has completed OP PT/OT.  Mostly ambulatory with " walker.  He has some problems with long distance.  Otherwise, speech intelligibility has continued to improve.  He has some difficulty with close left three lateral fingers and some hypertension of this digits.  Otherwise, doing well overall. He also has some problems with clonus during activity. He is not wearing AFO and appears to be able to clear as long as he as walker.  Remains on apixabn for DVT.  Referred for eval for spasticity.        Medications:  None    Current therapy: None      Interval History/Previous Treatment:    (3/29/2023);  He is doing well. Wife present during encounter.   Responded well to the botox treatment.  It appears that his leg is turning inward with gait but that has improved quite a bit after botox.  No falls. He is using a quad cane to get around.   He has filed for disability.      Review of Systems   All other systems reviewed and are negative.    Physical Exam  Vitals and nursing note reviewed.   Constitutional:       Appearance: Normal appearance. He is obese.   HENT:      Head: Normocephalic and atraumatic.      Right Ear: Tympanic membrane normal.      Left Ear: Tympanic membrane normal.      Nose: Nose normal.      Mouth/Throat:      Mouth: Mucous membranes are moist.      Pharynx: Oropharynx is clear.   Eyes:      Extraocular Movements: Extraocular movements intact.      Conjunctiva/sclera: Conjunctivae normal.      Pupils: Pupils are equal, round, and reactive to light.   Cardiovascular:      Rate and Rhythm: Regular rhythm. Tachycardia present.      Pulses: Normal pulses.      Heart sounds: Normal heart sounds.   Pulmonary:      Effort: Pulmonary effort is normal.      Breath sounds: Normal breath sounds.   Abdominal:      General: Abdomen is flat. Bowel sounds are normal.      Palpations: Abdomen is soft.   Musculoskeletal:         General: No swelling or deformity. Normal range of motion.      Right lower leg: No edema.   Skin:     General: Skin is warm.      Capillary  Refill: Capillary refill takes less than 2 seconds.   Neurological:      General: No focal deficit present.      Mental Status: He is alert and oriented to person, place, and time.      Cranial Nerves: No cranial nerve deficit.      Motor: No weakness.      Gait: Gait normal.   Psychiatric:         Mood and Affect: Mood normal.       UE Spasticity Exam:  Shoulder abductors: 1  Elbow flexors: 1  Forearm pronators: 1  Wrist flexors: 1  Finger flexors: 1+  Thumb flexors: 1    LE Spasticity Exam:  Hip flexors: 1  Hip adductors: 1  Knee recurvatum: 1  Knee flexors: 1  Ankle plantar flexors: 1+  Ankle invertors: 1  Toe flexors: 1      IMAGING RESULTS:     MRI BRAIN WITHOUT CONTRAST (7/8/2021):     Acute pontine infarct lateralizing to the RIGHT     No significant arterial abnormalities.      Meliton was seen today for follow-up.    Diagnoses and all orders for this visit:    Spastic hemiparesis  -     Prior authorization Order    Right pontine stroke  -     Prior authorization Order          Plan:  Overall, he has responded to the low dose botox with ankle movement and hand   to the left side. He reports improved  strength to walker when he uses it and to the ankle lift and inversion when he ambulated. I discussed that he has very mild tone and that it may be too soon for another treatment.  Discussed safety precautions with assistive device.  Otherwise, if  and leg start to stiffen, I asked patient to contact clinic to schedule appt. I have already placed orders for 100 units botox pre-emptively.    RTC: PRN.

## 2023-03-29 NOTE — Clinical Note
Hello, I know this patient was started on eliquis when he was in inpatient rehab for DVT/PE.That was over a year ago. Is there any other indication for the apixaban? When I asked the patient, he told me it was just for the DVT and PE.

## 2023-04-12 ENCOUNTER — PATIENT MESSAGE (OUTPATIENT)
Dept: ADMINISTRATIVE | Facility: HOSPITAL | Age: 58
End: 2023-04-12
Payer: MEDICAID

## 2023-04-24 ENCOUNTER — PATIENT OUTREACH (OUTPATIENT)
Dept: ADMINISTRATIVE | Facility: HOSPITAL | Age: 58
End: 2023-04-24
Payer: MEDICAID

## 2023-04-24 DIAGNOSIS — N18.30 TYPE 2 DIABETES MELLITUS WITH STAGE 3 CHRONIC KIDNEY DISEASE, WITHOUT LONG-TERM CURRENT USE OF INSULIN, UNSPECIFIED WHETHER STAGE 3A OR 3B CKD: Primary | ICD-10-CM

## 2023-04-24 DIAGNOSIS — E11.22 TYPE 2 DIABETES MELLITUS WITH STAGE 3 CHRONIC KIDNEY DISEASE, WITHOUT LONG-TERM CURRENT USE OF INSULIN, UNSPECIFIED WHETHER STAGE 3A OR 3B CKD: Primary | ICD-10-CM

## 2023-05-24 ENCOUNTER — PATIENT MESSAGE (OUTPATIENT)
Dept: ADMINISTRATIVE | Facility: HOSPITAL | Age: 58
End: 2023-05-24
Payer: MEDICAID

## 2023-05-24 DIAGNOSIS — Z12.11 SCREENING FOR COLON CANCER: ICD-10-CM

## 2023-06-06 ENCOUNTER — PATIENT MESSAGE (OUTPATIENT)
Dept: INTERNAL MEDICINE | Facility: CLINIC | Age: 58
End: 2023-06-06
Payer: MEDICAID

## 2023-06-07 RX ORDER — ESCITALOPRAM OXALATE 5 MG/1
5 TABLET ORAL DAILY
Qty: 30 TABLET | Refills: 1 | Status: SHIPPED | OUTPATIENT
Start: 2023-06-07 | End: 2023-07-31

## 2023-06-07 NOTE — TELEPHONE ENCOUNTER
Care Due:                  Date            Visit Type   Department     Provider  --------------------------------------------------------------------------------                                EP -                              PRIMARY      [facility] INTERNAL  Rodrick Curtis  Last Visit: 12-      [facility] ([facility])   [facility]  Next Visit: None Scheduled  None         None Found                                                            Last  Test          Frequency    Reason                     Performed    Due Date  --------------------------------------------------------------------------------    CMP.........  12 months..  atorvastatin, losartan,    07- 07-                             metFORMIN................    HBA1C.......  6 months...  metFORMIN................  11- 05-    Lipid Panel.  12 months..  atorvastatin.............  07- 07-    [facility] Embedded Care Due Messages. Reference number: [id number].   6/07/2023 8:14:34 AM CDT

## 2023-06-07 NOTE — TELEPHONE ENCOUNTER
Refill Encounter unsure of diagnosis    PCP Visits: Recent Visits  Date Type Provider Dept   12/14/22 Office Visit Rodrick Angulo MD [facility] Internal Medicine   Showing recent visits within past 360 days and meeting all other requirements  Future Appointments  No visits were found meeting these conditions.  Showing future appointments within next 720 days and meeting all other requirements     Last 3 Blood Pressure:   BP Readings from Last 3 Encounters:   12/14/22 138/88   11/18/22 (!) 156/93   09/08/22 (!) 144/98     Preferred Pharmacy:   [facility] [place] - [address]  [address] [place] [address]  Phone: [phone] Fax: [phone]    Requested RX:  Requested Prescriptions     Pending Prescriptions Disp Refills    EScitalopram oxalate (LEXAPRO) 5 MG Tab 30 tablet 1     Sig: Take 1 tablet (5 mg total) by mouth once daily.      RX Route: Normal

## 2023-07-26 DIAGNOSIS — E11.9 TYPE 2 DIABETES MELLITUS WITHOUT COMPLICATION: ICD-10-CM

## 2023-07-29 NOTE — TELEPHONE ENCOUNTER
No care due was identified.  [facility] Embedded Care Due Messages. Reference number: [id number].   7/29/2023 9:15:38 AM CDT

## 2023-07-30 NOTE — TELEPHONE ENCOUNTER
Refill Routing Note   Medication(s) are not appropriate for processing by [facility] for the following reason(s):      New or recently adjusted medication    [facility] action(s):  Defer Care Due:  None identified            Appointments  past 12m or future 3m with PCP    Date Provider   Last Visit   12/14/2022 Rodrick Angulo MD   Next Visit   Visit date not found Rodrick Angulo MD   ED visits in past 90 days: 0        Note composed:2:52 AM 07/30/2023

## 2023-07-31 RX ORDER — ESCITALOPRAM OXALATE 5 MG/1
5 TABLET ORAL
Qty: 30 TABLET | Refills: 0 | Status: SHIPPED | OUTPATIENT
Start: 2023-07-31 | End: 2023-08-28

## 2023-08-23 ENCOUNTER — PATIENT MESSAGE (OUTPATIENT)
Dept: ADMINISTRATIVE | Facility: HOSPITAL | Age: 58
End: 2023-08-23
Payer: MEDICAID

## 2023-08-25 DIAGNOSIS — R09.81 NASAL CONGESTION: ICD-10-CM

## 2023-08-26 NOTE — TELEPHONE ENCOUNTER
Care Due:                  Date            Visit Type   Department     Provider  --------------------------------------------------------------------------------                                EP -                              PRIMARY      [facility] INTERNAL  Rodrick Curtis  Last Visit: 12-      [facility] ([facility])   [facility]  Next Visit: None Scheduled  None         None Found                                                            Last  Test          Frequency    Reason                     Performed    Due Date  --------------------------------------------------------------------------------    CMP.........  12 months..  atorvastatin, losartan,    07- 07-                             metFORMIN................    HBA1C.......  6 months...  metFORMIN................  11- 05-    Lipid Panel.  12 months..  atorvastatin.............  07- 07-    [facility] Embedded Care Due Messages. Reference number: [id number].   8/25/2023 10:21:04 PM CDT

## 2023-08-26 NOTE — TELEPHONE ENCOUNTER
No care due was identified.  [facility] Embedded Care Due Messages. Reference number: [id number].   8/25/2023 10:21:35 PM CDT

## 2023-08-27 NOTE — TELEPHONE ENCOUNTER
Refill Routing Note   Medication(s) are not appropriate for processing by [facility] for the following reason(s):      New or recently adjusted medication    [facility] action(s):  Defer Care Due:  None identified            Appointments  past 12m or future 3m with PCP    Date Provider   Last Visit   12/14/2022 Rodrick Angulo MD   Next Visit   Visit date not found Rodrick Angulo MD   ED visits in past 90 days: 0        Note composed:10:37 PM 08/26/2023

## 2023-08-27 NOTE — TELEPHONE ENCOUNTER
Refill Routing Note   Medication(s) are not appropriate for processing by [facility] for the following reason(s):      No active prescription written by provider    [facility] action(s):  Defer Care Due:  Labs due            Appointments  past 12m or future 3m with PCP    Date Provider   Last Visit   12/14/2022 Rodirck Angulo MD   Next Visit   8/25/2023 Rodrick Angulo MD   ED visits in past 90 days: 0        Note composed:10:36 PM 08/26/2023

## 2023-08-28 RX ORDER — ESCITALOPRAM OXALATE 5 MG/1
5 TABLET ORAL
Qty: 90 TABLET | Refills: 1 | Status: SHIPPED | OUTPATIENT
Start: 2023-08-28 | End: 2024-01-22 | Stop reason: SDUPTHER

## 2023-08-28 RX ORDER — CETIRIZINE HYDROCHLORIDE 10 MG/1
10 TABLET ORAL
Qty: 90 TABLET | Refills: 1 | Status: SHIPPED | OUTPATIENT
Start: 2023-08-28 | End: 2024-02-16

## 2023-09-12 LAB — HEMOCCULT STL QL IA: NEGATIVE

## 2023-09-20 DIAGNOSIS — E78.5 DYSLIPIDEMIA: ICD-10-CM

## 2023-09-20 DIAGNOSIS — I63.9 ACUTE ISCHEMIC STROKE: ICD-10-CM

## 2023-09-20 RX ORDER — ATORVASTATIN CALCIUM 80 MG/1
TABLET, FILM COATED ORAL
Qty: 90 TABLET | Refills: 0 | Status: SHIPPED | OUTPATIENT
Start: 2023-09-20 | End: 2023-12-26

## 2023-09-20 NOTE — TELEPHONE ENCOUNTER
Refill Routing Note   Medication(s) are not appropriate for processing by [facility] for the following reason(s):      Required labs outdated    [facility] action(s):  Defer Care Due:  Appointment due            Appointments  past 12m or future 3m with PCP    Date Provider   Last Visit   12/14/2022 Rodrick Angulo MD   Next Visit   Visit date not found Rodrick Angulo MD   ED visits in past 90 days: 0        Note composed:2:32 PM 09/20/2023

## 2023-09-20 NOTE — TELEPHONE ENCOUNTER
Care Due:                  Date            Visit Type   Department     Provider  --------------------------------------------------------------------------------                                EP -                              PRIMARY      [facility] INTERNAL  Rodrick Curtis  Last Visit: 12-      [facility]   [facility]  Next Visit: None Scheduled  None         None Found                                                            Last  Test          Frequency    Reason                     Performed    Due Date  --------------------------------------------------------------------------------    Office Visit  12 months..  EScitalopram,              12- 12-                             atorvastatin, cetirizine,                             losartan, metFORMIN......    [employer] Embedded Care Due Messages. Reference number: [id number].   9/20/2023 2:02:35 PM CDT

## 2023-11-22 ENCOUNTER — PATIENT MESSAGE (OUTPATIENT)
Dept: ADMINISTRATIVE | Facility: HOSPITAL | Age: 58
End: 2023-11-22
Payer: MEDICAID

## 2023-12-22 DIAGNOSIS — I63.9 ACUTE ISCHEMIC STROKE: ICD-10-CM

## 2023-12-22 DIAGNOSIS — I10 ESSENTIAL HYPERTENSION: ICD-10-CM

## 2023-12-22 DIAGNOSIS — E78.5 DYSLIPIDEMIA: ICD-10-CM

## 2023-12-22 DIAGNOSIS — N18.30 TYPE 2 DIABETES MELLITUS WITH STAGE 3 CHRONIC KIDNEY DISEASE, WITHOUT LONG-TERM CURRENT USE OF INSULIN, UNSPECIFIED WHETHER STAGE 3A OR 3B CKD: ICD-10-CM

## 2023-12-22 DIAGNOSIS — I26.99 BILATERAL PULMONARY EMBOLISM: ICD-10-CM

## 2023-12-22 DIAGNOSIS — E11.22 TYPE 2 DIABETES MELLITUS WITH STAGE 3 CHRONIC KIDNEY DISEASE, WITHOUT LONG-TERM CURRENT USE OF INSULIN, UNSPECIFIED WHETHER STAGE 3A OR 3B CKD: ICD-10-CM

## 2023-12-22 NOTE — TELEPHONE ENCOUNTER
Care Due:                  Date            Visit Type   Department     Provider  --------------------------------------------------------------------------------                                EP -                              PRIMARY      [facility] INTERNAL  Rodrick Curtis  Last Visit: 12-      [facility] ([facility]   [facility]  Next Visit: None Scheduled  None         None Found                                                            Last  Test          Frequency    Reason                     Performed    Due Date  --------------------------------------------------------------------------------    Office Visit  15 months..  EScitalopram,              12- 03-                             atorvastatin, cetirizine,                             losartan, metFORMIN......    CMP.........  12 months..  atorvastatin, losartan,    07- 07-                             metFORMIN................    HBA1C.......  6 months...  metFORMIN................  11- 05-    Lipid Panel.  12 months..  atorvastatin.............  07- 07-    [facility] Embedded Care Due Messages. Reference number: [id number].   12/22/2023 12:04:58 PM CST

## 2023-12-24 NOTE — TELEPHONE ENCOUNTER
Refill Routing Note   Medication(s) are not appropriate for processing by [facility] for the following reason(s):        Required labs outdated  Required vitals outdated  Outside of protocol    [facility] action(s):  Route  Defer   Requires appointment : Yes   Requires labs : Yes      Medication Therapy Plan: Eliquis: outside of protocol: Due: OV, CMP, A1C, Lipid      Appointments  past 12m or future 3m with PCP    Date Provider   Last Visit   12/14/2022 Rodrick Angulo MD   Next Visit   Visit date not found Rodrick Angulo MD   ED visits in past 90 days: 0        Note composed:11:42 PM 12/23/2023

## 2023-12-26 RX ORDER — ATORVASTATIN CALCIUM 80 MG/1
TABLET, FILM COATED ORAL
Qty: 90 TABLET | Refills: 0 | Status: SHIPPED | OUTPATIENT
Start: 2023-12-26 | End: 2024-01-22 | Stop reason: SDUPTHER

## 2023-12-26 RX ORDER — LOSARTAN POTASSIUM 50 MG/1
50 TABLET ORAL
Qty: 90 TABLET | Refills: 0 | Status: SHIPPED | OUTPATIENT
Start: 2023-12-26 | End: 2024-01-22 | Stop reason: SDUPTHER

## 2023-12-26 RX ORDER — APIXABAN 5 MG/1
5 TABLET, FILM COATED ORAL 2 TIMES DAILY
Qty: 60 TABLET | Refills: 0 | Status: SHIPPED | OUTPATIENT
Start: 2023-12-26 | End: 2024-01-22 | Stop reason: SDUPTHER

## 2023-12-26 RX ORDER — AMLODIPINE BESYLATE 10 MG/1
TABLET ORAL
Qty: 90 TABLET | Refills: 0 | Status: SHIPPED | OUTPATIENT
Start: 2023-12-26 | End: 2024-01-22 | Stop reason: SDUPTHER

## 2023-12-26 RX ORDER — METFORMIN HYDROCHLORIDE 500 MG/1
1000 TABLET ORAL 2 TIMES DAILY WITH MEALS
Qty: 360 TABLET | Refills: 0 | Status: SHIPPED | OUTPATIENT
Start: 2023-12-26 | End: 2024-01-22 | Stop reason: SDUPTHER

## 2023-12-26 RX ORDER — LOSARTAN POTASSIUM 25 MG/1
25 TABLET ORAL
Qty: 90 TABLET | Refills: 0 | Status: SHIPPED | OUTPATIENT
Start: 2023-12-26 | End: 2024-04-02 | Stop reason: SDUPTHER

## 2024-01-08 ENCOUNTER — PATIENT OUTREACH (OUTPATIENT)
Dept: ADMINISTRATIVE | Facility: HOSPITAL | Age: 59
End: 2024-01-08
Payer: MEDICARE

## 2024-01-08 DIAGNOSIS — Z00.00 PREVENTATIVE HEALTH CARE: Primary | ICD-10-CM

## 2024-01-13 LAB
ALBUMIN SERPL-MCNC: 4.3 G/DL (ref 3.6–5.1)
ALBUMIN/GLOB SERPL: 1.5 (CALC) (ref 1–2.5)
ALP SERPL-CCNC: 86 U/L (ref 35–144)
ALT SERPL-CCNC: 19 U/L (ref 9–46)
AST SERPL-CCNC: 16 U/L (ref 10–35)
BILIRUB SERPL-MCNC: 0.7 MG/DL (ref 0.2–1.2)
BUN SERPL-MCNC: 14 MG/DL (ref 7–25)
BUN/CREAT SERPL: ABNORMAL (CALC) (ref 6–22)
CALCIUM SERPL-MCNC: 9.3 MG/DL (ref 8.6–10.3)
CHLORIDE SERPL-SCNC: 103 MMOL/L (ref 98–110)
CHOLEST SERPL-MCNC: 111 MG/DL
CHOLEST/HDLC SERPL: 3.4 (CALC)
CO2 SERPL-SCNC: 29 MMOL/L (ref 20–32)
CREAT SERPL-MCNC: 1.24 MG/DL (ref 0.7–1.3)
EGFR: 68 ML/MIN/1.73M2
GLOBULIN SER CALC-MCNC: 2.9 G/DL (CALC) (ref 1.9–3.7)
GLUCOSE SERPL-MCNC: 129 MG/DL (ref 65–99)
HBA1C MFR BLD: 6.4 % OF TOTAL HGB
HDLC SERPL-MCNC: 33 MG/DL
LDLC SERPL CALC-MCNC: 57 MG/DL (CALC)
NONHDLC SERPL-MCNC: 78 MG/DL (CALC)
POTASSIUM SERPL-SCNC: 4.2 MMOL/L (ref 3.5–5.3)
PROT SERPL-MCNC: 7.2 G/DL (ref 6.1–8.1)
SODIUM SERPL-SCNC: 141 MMOL/L (ref 135–146)
TRIGL SERPL-MCNC: 128 MG/DL

## 2024-01-17 LAB
ALBUMIN/CREAT UR: 10 MCG/MG CREAT
CREAT UR-MCNC: 119 MG/DL (ref 20–320)
MICROALBUMIN UR-MCNC: 1.2 MG/DL

## 2024-01-18 ENCOUNTER — TELEPHONE (OUTPATIENT)
Dept: INTERNAL MEDICINE | Facility: CLINIC | Age: 59
End: 2024-01-18
Payer: MEDICARE

## 2024-01-18 ENCOUNTER — PATIENT MESSAGE (OUTPATIENT)
Dept: INTERNAL MEDICINE | Facility: CLINIC | Age: 59
End: 2024-01-18
Payer: MEDICARE

## 2024-01-18 NOTE — TELEPHONE ENCOUNTER
"----- Message from Clara Ben sent at 1/17/2024  4:27 PM CST -----  Regarding: Call back  "Type:  Patient Call Back    Who Called:PT    What is the reqeust in detail:Pt requesting a call back pt says he has vertigo and would like something for it. Please advise    Can the clinic reply by [facility]?no    Best Call Back Number:[phone]      Additional Information:[facility] - [place]), [place] - [address]                "

## 2024-01-18 NOTE — TELEPHONE ENCOUNTER
----- Message from Puala Schafer sent at 1/18/2024 12:59 PM CST -----  Contact: MIREYA GREEN [[id number]]  Type:  Patient Returning Call      Who Called:   MIREYA GREEN [[id number]]      Who Left Message for Patient:  Paty Resendiz MA      Does the patient know what this is regarding?: No      Would the patient rather a call back or a response via [facility]?  Call back       Best Call Back Number: [phone] ([place])       Additional Information:

## 2024-01-18 NOTE — TELEPHONE ENCOUNTER
Spoke with pt wife Corry who stated the pt was not around at the moment. Informed her I called his cell but did not get an answer to discuss vertigo. Pt wife stated the pt now has Medicare insurance and she will send his information over to update this in his chart and possibly get an ENT referral. Pt scheduled with Dr Angulo 1/22/24

## 2024-01-22 ENCOUNTER — OFFICE VISIT (OUTPATIENT)
Dept: INTERNAL MEDICINE | Facility: CLINIC | Age: 59
End: 2024-01-22
Attending: FAMILY MEDICINE
Payer: MEDICARE

## 2024-01-22 VITALS
DIASTOLIC BLOOD PRESSURE: 88 MMHG | HEIGHT: 67 IN | BODY MASS INDEX: 33.74 KG/M2 | SYSTOLIC BLOOD PRESSURE: 138 MMHG | WEIGHT: 215 LBS | OXYGEN SATURATION: 96 % | HEART RATE: 93 BPM

## 2024-01-22 DIAGNOSIS — E11.22 TYPE 2 DIABETES MELLITUS WITH STAGE 3 CHRONIC KIDNEY DISEASE, WITHOUT LONG-TERM CURRENT USE OF INSULIN, UNSPECIFIED WHETHER STAGE 3A OR 3B CKD: ICD-10-CM

## 2024-01-22 DIAGNOSIS — I63.9 ACUTE ISCHEMIC STROKE: ICD-10-CM

## 2024-01-22 DIAGNOSIS — E78.5 DYSLIPIDEMIA: ICD-10-CM

## 2024-01-22 DIAGNOSIS — I10 ESSENTIAL HYPERTENSION: Primary | ICD-10-CM

## 2024-01-22 DIAGNOSIS — N18.30 TYPE 2 DIABETES MELLITUS WITH STAGE 3 CHRONIC KIDNEY DISEASE, WITHOUT LONG-TERM CURRENT USE OF INSULIN, UNSPECIFIED WHETHER STAGE 3A OR 3B CKD: ICD-10-CM

## 2024-01-22 DIAGNOSIS — I26.99 BILATERAL PULMONARY EMBOLISM: ICD-10-CM

## 2024-01-22 DIAGNOSIS — R42 DIZZINESS: ICD-10-CM

## 2024-01-22 PROCEDURE — 99214 OFFICE O/P EST MOD 30 MIN: CPT | Mod: PBBFAC | Performed by: FAMILY MEDICINE

## 2024-01-22 PROCEDURE — 99999 PR PBB SHADOW E&M-EST. PATIENT-LVL IV: CPT | Mod: PBBFAC,,, | Performed by: FAMILY MEDICINE

## 2024-01-22 PROCEDURE — 99214 OFFICE O/P EST MOD 30 MIN: CPT | Mod: S$PBB,,, | Performed by: FAMILY MEDICINE

## 2024-01-22 RX ORDER — ESCITALOPRAM OXALATE 5 MG/1
5 TABLET ORAL DAILY
Qty: 90 TABLET | Refills: 5 | Status: SHIPPED | OUTPATIENT
Start: 2024-01-22

## 2024-01-22 RX ORDER — AMLODIPINE BESYLATE 10 MG/1
10 TABLET ORAL DAILY
Qty: 90 TABLET | Refills: 3 | Status: SHIPPED | OUTPATIENT
Start: 2024-01-22

## 2024-01-22 RX ORDER — CARVEDILOL 6.25 MG/1
6.25 TABLET ORAL 2 TIMES DAILY WITH MEALS
Qty: 180 TABLET | Refills: 3 | Status: SHIPPED | OUTPATIENT
Start: 2024-01-22

## 2024-01-22 RX ORDER — PROMETHAZINE HYDROCHLORIDE 25 MG/1
25 TABLET ORAL EVERY 4 HOURS
Qty: 30 TABLET | Refills: 0 | Status: SHIPPED | OUTPATIENT
Start: 2024-01-22 | End: 2024-02-14 | Stop reason: SDUPTHER

## 2024-01-22 RX ORDER — LOSARTAN POTASSIUM 25 MG/1
25 TABLET ORAL DAILY
Qty: 90 TABLET | Refills: 3 | Status: CANCELLED | OUTPATIENT
Start: 2024-01-22

## 2024-01-22 RX ORDER — MECLIZINE HYDROCHLORIDE 25 MG/1
25 TABLET ORAL 3 TIMES DAILY PRN
Qty: 30 TABLET | Refills: 0 | Status: SHIPPED | OUTPATIENT
Start: 2024-01-22 | End: 2024-02-15 | Stop reason: SDUPTHER

## 2024-01-22 RX ORDER — LOSARTAN POTASSIUM 50 MG/1
50 TABLET ORAL DAILY
Qty: 90 TABLET | Refills: 3 | Status: SHIPPED | OUTPATIENT
Start: 2024-01-22 | End: 2024-04-02 | Stop reason: SDUPTHER

## 2024-01-22 RX ORDER — METFORMIN HYDROCHLORIDE 500 MG/1
1000 TABLET ORAL 2 TIMES DAILY WITH MEALS
Qty: 360 TABLET | Refills: 0 | Status: SHIPPED | OUTPATIENT
Start: 2024-01-22 | End: 2024-06-17 | Stop reason: SDUPTHER

## 2024-01-22 RX ORDER — ATORVASTATIN CALCIUM 80 MG/1
80 TABLET, FILM COATED ORAL DAILY
Qty: 90 TABLET | Refills: 3 | Status: SHIPPED | OUTPATIENT
Start: 2024-01-22

## 2024-01-22 RX ORDER — PANTOPRAZOLE SODIUM 40 MG/1
40 TABLET, DELAYED RELEASE ORAL DAILY
Qty: 90 TABLET | Refills: 3 | Status: SHIPPED | OUTPATIENT
Start: 2024-01-22

## 2024-01-22 NOTE — PROGRESS NOTES
"CHIEF COMPLAINT:   followup for CVA X2 d/t uncontrolled hypertension, diabetes, and hypercholesterolemia.     HISTORY OF PRESENT ILLNESS: The patient is a 58 year-old black male. He was inpt and rehab for total of several months d/t CVA X2 and hyperglycemia.  Ultimately complicated with DVT and significant filomena PEs.    He has been having postprandial diarrhea with his metformin especially in the morning.    The patient has diabetes.  Recent blood sugars have been less than 200.  There have been no episodes of hypoglycemia.  Patient does routinely monitor their blood sugar.    He is on Lipitor and metformin which he is tolerating well.      He is on amlodipine, Coreg, clonidine and losartan.  Blood pressure is well controlled today.      REVIEW OF SYSTEMS:   GENERAL: No fever, chills, fatigability.   SKIN: No rashes, itching or changes in color or texture of skin.   HEAD: No headaches or recent head trauma.   EYES: Visual acuity fine. No photophobia, ocular pain or diplopia.   EARS: Denies ear pain, discharge.   NOSE: No loss of smell, no epistaxis or postnasal drip.   MOUTH & THROAT: No hoarseness or change in voice. No excessive gum bleeding.   NODES: Denies swollen glands.   CHEST: Denies GUARDADO, cyanosis, wheezing, and sputum production.   CARDIOVASCULAR: Denies chest pain, PND, orthopnea or reduced exercise tolerance.   ABDOMEN: Appetite fine. Denies diarrhea, abdominal pain, hematemesis or blood in stool.   URINARY: No flank pain, dysuria or hematuria.   PERIPHERAL VASCULAR: No claudication or cyanosis.   MUSCULOSKELETAL: No joint stiffness or swelling. Denies back pain.   NEUROLOGIC: No history of seizures.     SOCIAL HISTORY: The patient does not smoke. The patient consumes alcohol socially. The patient is . 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     PHYSICAL EXAMINATION:   Blood pressure 138/88, pulse 93, height 5' 7" (1.702 m), weight 97.5 kg (215 lb), SpO2 96 %.    APPEARANCE: Well nourished, well " developed, in no acute distress.   HEAD: Normocephalic, atraumatic.   EYES: PERRL. EOMI. Conjunctivae without injection and anicteric   NOSE: Mucosa pink. Airway clear.   MOUTH & THROAT: No tonsillar enlargement. No pharyngeal erythema or exudate. No stridor.   NECK: Supple.   NODES: No cervical, axillary or inguinal lymph node enlargement.   CHEST: Lungs clear to auscultation. No retractions are noted. No rales or rhonchi are present.   CARDIOVASCULAR: Normal S1, S2. No rubs, murmurs or gallops.   ABDOMEN: Bowel sounds normal. Not distended. Soft. No tenderness or masses. No ascites is noted.   MUSCULOSKELETAL: There is no clubbing, cyanosis, or edema of the extremities x4. There is full range of motion of the lumbar spine. There is full range of motion of the extremities x4. There is no deformity noted.   NEUROLOGIC:   Dysarthric speech development.   Hearing normal.   Hemiparetic gait.   Motor shows residual hemiparesis  sensory exam grossly normal.   PSYCHIATRIC: Patient is alert and oriented x3. Thought processes are all normal. There is no homicidality. There is no suicidality. There is no evidence of psychosis.     LABORATORY/RADIOLOGY: Chart reviewed, including notes and discharge summary     ASSESSMENT:   Chronic constipation  Hypertension   Type 2 diabetes, condition is well controlled on current medication regimen   Hyperlipidemia, condition is well controlled on current medication regimen    PLAN:    We will update an A1c in 6 months  Return to clinic in 6 months

## 2024-02-01 ENCOUNTER — TELEPHONE (OUTPATIENT)
Dept: INTERNAL MEDICINE | Facility: CLINIC | Age: 59
End: 2024-02-01
Payer: MEDICARE

## 2024-02-01 RX ORDER — PROMETHAZINE HYDROCHLORIDE AND DEXTROMETHORPHAN HYDROBROMIDE 6.25; 15 MG/5ML; MG/5ML
5 SYRUP ORAL EVERY 4 HOURS PRN
Qty: 120 ML | Refills: 1 | Status: SHIPPED | OUTPATIENT
Start: 2024-02-01 | End: 2024-02-11

## 2024-02-01 NOTE — TELEPHONE ENCOUNTER
----- Message from Marcelle Harrison sent at 2/1/2024 11:30 AM CST -----  Name of Who is calling :MIREYA GREEN [[id number]]        What is the request in detail:Pt stated he has adalberto in his chest. He wanted to know if he can get prescribed on cough medicine that's good for his blood pressure. Please assist         Can the clinic reply by [facility]:  no          What [facility]: [phone]

## 2024-02-02 NOTE — TELEPHONE ENCOUNTER
Spoke with the pt and informed him that Dr Angulo sent in Phenergan DM for him. Pt verbalized understanding.

## 2024-02-14 NOTE — TELEPHONE ENCOUNTER
No care due was identified.  [facility] Embedded Care Due Messages. Reference number: [id number].   2/14/2024 7:52:05 AM CST

## 2024-02-14 NOTE — TELEPHONE ENCOUNTER
----- Message from Elaine Pool sent at 2/12/2024  3:09 PM CST -----  Regarding: Medication  Refill  Request                      Reply in [facility]:  NO      Please refill the medication listed below. Please call the patient     [phone] (C)        Medication     promethazine (PHENERGAN) 25 MG tablet       Preferred Pharmacy: [address] ()[place] [address]   Phone: [phone]  Fax: [phone]

## 2024-02-14 NOTE — TELEPHONE ENCOUNTER
Refill Encounter unsure of dx    PCP Visits: Recent Visits  Date Type Provider Dept   01/22/24 Office Visit Rodrick Angulo MD [facility] Internal Medicine   Showing recent visits within past 360 days and meeting all other requirements  Future Appointments  No visits were found meeting these conditions.  Showing future appointments within next 720 days and meeting all other requirements     Last 3 Blood Pressure:   BP Readings from Last 3 Encounters:   01/22/24 138/88   12/14/22 138/88   11/18/22 (!) 156/93     Preferred Pharmacy:   [facility] [place] - [address]  [address] [place]) [place] [address]  Phone: [phone] Fax: [phone]    Requested RX:  Requested Prescriptions      No prescriptions requested or ordered in this encounter      RX Route: Normal    Writer spoke with [facility] and they actually need the physical prescriptions.  Writer printed and Dr. Jiménez signed.  Prescriptions were faxed as requested.

## 2024-02-15 RX ORDER — PROMETHAZINE HYDROCHLORIDE 25 MG/1
25 TABLET ORAL EVERY 4 HOURS
Qty: 30 TABLET | Refills: 0 | Status: ON HOLD | OUTPATIENT
Start: 2024-02-15 | End: 2024-04-07 | Stop reason: HOSPADM

## 2024-02-15 NOTE — TELEPHONE ENCOUNTER
No care due was identified.  [facility] Embedded Care Due Messages. Reference number: [id number].   2/15/2024 4:44:12 PM CST

## 2024-02-15 NOTE — TELEPHONE ENCOUNTER
LOV: 1/22/2024  No upcoming appointments on file at this time.    Allergies confirmed, medication pended, and routed to PCP for advise.

## 2024-02-15 NOTE — TELEPHONE ENCOUNTER
----- Message from Digna Finney sent at 2/15/2024  4:24 PM CST -----  Regarding: refill  Name of caller: becky       What is the requesting detail:  requesting a refill for meclizine (ANTIVERT) 25 mg tablet      [facility] - [place] - [address]          Can the clinic reply by [facility]:       What number to call back: [phone]

## 2024-02-16 RX ORDER — MECLIZINE HYDROCHLORIDE 25 MG/1
25 TABLET ORAL 3 TIMES DAILY PRN
Qty: 30 TABLET | Refills: 0 | Status: SHIPPED | OUTPATIENT
Start: 2024-02-16

## 2024-04-02 DIAGNOSIS — I10 ESSENTIAL HYPERTENSION: ICD-10-CM

## 2024-04-02 RX ORDER — LOSARTAN POTASSIUM 25 MG/1
25 TABLET ORAL DAILY
Qty: 90 TABLET | Refills: 3 | Status: SHIPPED | OUTPATIENT
Start: 2024-04-02

## 2024-04-02 RX ORDER — LOSARTAN POTASSIUM 50 MG/1
50 TABLET ORAL DAILY
Qty: 90 TABLET | Refills: 3 | Status: ON HOLD | OUTPATIENT
Start: 2024-04-02 | End: 2024-04-07 | Stop reason: SDUPTHER

## 2024-04-02 NOTE — TELEPHONE ENCOUNTER
No care due was identified.  [facility] Embedded Care Due Messages. Reference number: [id number].   4/02/2024 3:36:38 PM CDT

## 2024-04-04 ENCOUNTER — HOSPITAL ENCOUNTER (INPATIENT)
Facility: OTHER | Age: 59
LOS: 2 days | Discharge: HOME | DRG: 378 | End: 2024-04-07
Attending: EMERGENCY MEDICINE | Admitting: HOSPITALIST
Payer: MEDICARE

## 2024-04-04 DIAGNOSIS — E11.22 TYPE 2 DIABETES MELLITUS WITH STAGE 3 CHRONIC KIDNEY DISEASE, WITHOUT LONG-TERM CURRENT USE OF INSULIN, UNSPECIFIED WHETHER STAGE 3A OR 3B CKD: ICD-10-CM

## 2024-04-04 DIAGNOSIS — K92.2 LOWER GI BLEED: Primary | ICD-10-CM

## 2024-04-04 DIAGNOSIS — K92.2 GI BLEED: ICD-10-CM

## 2024-04-04 DIAGNOSIS — N18.30 TYPE 2 DIABETES MELLITUS WITH STAGE 3 CHRONIC KIDNEY DISEASE, WITHOUT LONG-TERM CURRENT USE OF INSULIN, UNSPECIFIED WHETHER STAGE 3A OR 3B CKD: ICD-10-CM

## 2024-04-04 PROBLEM — Z79.01 ON CONTINUOUS ORAL ANTICOAGULATION: Status: ACTIVE | Noted: 2024-04-04

## 2024-04-04 PROBLEM — I69.30 HISTORY OF CVA WITH RESIDUAL DEFICIT: Status: ACTIVE | Noted: 2024-04-04

## 2024-04-04 LAB
ABO + RH BLD: NORMAL
ALBUMIN SERPL BCP-MCNC: 3.5 G/DL (ref 3.5–5.2)
ALP SERPL-CCNC: 59 U/L (ref 55–135)
ALT SERPL W/O P-5'-P-CCNC: 17 U/L (ref 10–44)
ANION GAP SERPL CALC-SCNC: 11 MMOL/L (ref 8–16)
AST SERPL-CCNC: 16 U/L (ref 10–40)
BASOPHILS # BLD AUTO: 0.01 K/UL (ref 0–0.2)
BASOPHILS NFR BLD: 0.1 % (ref 0–1.9)
BILIRUB SERPL-MCNC: 0.7 MG/DL (ref 0.1–1)
BLD GP AB SCN CELLS X3 SERPL QL: NORMAL
BUN SERPL-MCNC: 30 MG/DL (ref 6–20)
CALCIUM SERPL-MCNC: 9.2 MG/DL (ref 8.7–10.5)
CHLORIDE SERPL-SCNC: 106 MMOL/L (ref 95–110)
CO2 SERPL-SCNC: 24 MMOL/L (ref 23–29)
CREAT SERPL-MCNC: 1.5 MG/DL (ref 0.5–1.4)
DIFFERENTIAL METHOD BLD: ABNORMAL
EOSINOPHIL # BLD AUTO: 0.2 K/UL (ref 0–0.5)
EOSINOPHIL NFR BLD: 2.5 % (ref 0–8)
ERYTHROCYTE [DISTWIDTH] IN BLOOD BY AUTOMATED COUNT: 13.8 % (ref 11.5–14.5)
EST. GFR  (NO RACE VARIABLE): 54 ML/MIN/1.73 M^2
GLUCOSE SERPL-MCNC: 109 MG/DL (ref 70–110)
HCT VFR BLD AUTO: 30.5 % (ref 40–54)
HCT VFR BLD AUTO: 33.2 % (ref 40–54)
HGB BLD-MCNC: 10.9 G/DL (ref 14–18)
HGB BLD-MCNC: 9.9 G/DL (ref 14–18)
IMM GRANULOCYTES # BLD AUTO: 0.03 K/UL (ref 0–0.04)
IMM GRANULOCYTES NFR BLD AUTO: 0.3 % (ref 0–0.5)
INR PPP: 1.2 (ref 0.8–1.2)
LYMPHOCYTES # BLD AUTO: 2.1 K/UL (ref 1–4.8)
LYMPHOCYTES NFR BLD: 22 % (ref 18–48)
MCH RBC QN AUTO: 27 PG (ref 27–31)
MCHC RBC AUTO-ENTMCNC: 32.8 G/DL (ref 32–36)
MCV RBC AUTO: 82 FL (ref 82–98)
MONOCYTES # BLD AUTO: 0.7 K/UL (ref 0.3–1)
MONOCYTES NFR BLD: 7.1 % (ref 4–15)
NEUTROPHILS # BLD AUTO: 6.3 K/UL (ref 1.8–7.7)
NEUTROPHILS NFR BLD: 68 % (ref 38–73)
NRBC BLD-RTO: 0 /100 WBC
PLATELET # BLD AUTO: 211 K/UL (ref 150–450)
PMV BLD AUTO: 10.4 FL (ref 9.2–12.9)
POCT GLUCOSE: 121 MG/DL (ref 70–110)
POTASSIUM SERPL-SCNC: 4.6 MMOL/L (ref 3.5–5.1)
PROT SERPL-MCNC: 6.4 G/DL (ref 6–8.4)
PROTHROMBIN TIME: 13.4 SEC (ref 9–12.5)
RBC # BLD AUTO: 4.03 M/UL (ref 4.6–6.2)
SODIUM SERPL-SCNC: 141 MMOL/L (ref 136–145)
SPECIMEN OUTDATE: NORMAL
WBC # BLD AUTO: 9.3 K/UL (ref 3.9–12.7)

## 2024-04-04 PROCEDURE — 99285 EMERGENCY DEPT VISIT HI MDM: CPT | Mod: 25

## 2024-04-04 PROCEDURE — 85018 HEMOGLOBIN: CPT | Performed by: NURSE PRACTITIONER

## 2024-04-04 PROCEDURE — 36415 COLL VENOUS BLD VENIPUNCTURE: CPT | Performed by: NURSE PRACTITIONER

## 2024-04-04 PROCEDURE — 82272 OCCULT BLD FECES 1-3 TESTS: CPT

## 2024-04-04 PROCEDURE — 96361 HYDRATE IV INFUSION ADD-ON: CPT

## 2024-04-04 PROCEDURE — G0378 HOSPITAL OBSERVATION PER HR: HCPCS

## 2024-04-04 PROCEDURE — 63600175 PHARM REV CODE 636 W HCPCS: Performed by: NURSE PRACTITIONER

## 2024-04-04 PROCEDURE — 93010 ELECTROCARDIOGRAM REPORT: CPT | Mod: ,,, | Performed by: INTERNAL MEDICINE

## 2024-04-04 PROCEDURE — 80053 COMPREHEN METABOLIC PANEL: CPT | Performed by: EMERGENCY MEDICINE

## 2024-04-04 PROCEDURE — 85025 COMPLETE CBC W/AUTO DIFF WBC: CPT | Performed by: EMERGENCY MEDICINE

## 2024-04-04 PROCEDURE — 93005 ELECTROCARDIOGRAM TRACING: CPT

## 2024-04-04 PROCEDURE — 94761 N-INVAS EAR/PLS OXIMETRY MLT: CPT

## 2024-04-04 PROCEDURE — 25000003 PHARM REV CODE 250: Performed by: NURSE PRACTITIONER

## 2024-04-04 PROCEDURE — 96374 THER/PROPH/DIAG INJ IV PUSH: CPT

## 2024-04-04 PROCEDURE — 85610 PROTHROMBIN TIME: CPT | Performed by: EMERGENCY MEDICINE

## 2024-04-04 PROCEDURE — C9113 INJ PANTOPRAZOLE SODIUM, VIA: HCPCS | Performed by: NURSE PRACTITIONER

## 2024-04-04 PROCEDURE — 85014 HEMATOCRIT: CPT | Performed by: NURSE PRACTITIONER

## 2024-04-04 PROCEDURE — 86850 RBC ANTIBODY SCREEN: CPT | Performed by: EMERGENCY MEDICINE

## 2024-04-04 RX ORDER — TRAMADOL HYDROCHLORIDE 50 MG/1
50 TABLET ORAL EVERY 6 HOURS PRN
Status: DISCONTINUED | OUTPATIENT
Start: 2024-04-04 | End: 2024-04-07 | Stop reason: HOSPADM

## 2024-04-04 RX ORDER — SODIUM CHLORIDE 0.9 % (FLUSH) 0.9 %
10 SYRINGE (ML) INJECTION
Status: DISCONTINUED | OUTPATIENT
Start: 2024-04-04 | End: 2024-04-07 | Stop reason: HOSPADM

## 2024-04-04 RX ORDER — TALC
6 POWDER (GRAM) TOPICAL NIGHTLY PRN
Status: DISCONTINUED | OUTPATIENT
Start: 2024-04-04 | End: 2024-04-07 | Stop reason: HOSPADM

## 2024-04-04 RX ORDER — ESCITALOPRAM OXALATE 5 MG/1
5 TABLET ORAL DAILY
Status: DISCONTINUED | OUTPATIENT
Start: 2024-04-05 | End: 2024-04-07 | Stop reason: HOSPADM

## 2024-04-04 RX ORDER — GLUCAGON 1 MG
1 KIT INJECTION
Status: DISCONTINUED | OUTPATIENT
Start: 2024-04-04 | End: 2024-04-07 | Stop reason: HOSPADM

## 2024-04-04 RX ORDER — ACETAMINOPHEN 325 MG/1
650 TABLET ORAL EVERY 4 HOURS PRN
Status: DISCONTINUED | OUTPATIENT
Start: 2024-04-04 | End: 2024-04-07 | Stop reason: HOSPADM

## 2024-04-04 RX ORDER — ATORVASTATIN CALCIUM 20 MG/1
80 TABLET, FILM COATED ORAL DAILY
Status: DISCONTINUED | OUTPATIENT
Start: 2024-04-05 | End: 2024-04-07 | Stop reason: HOSPADM

## 2024-04-04 RX ORDER — CARVEDILOL 6.25 MG/1
6.25 TABLET ORAL 2 TIMES DAILY WITH MEALS
Status: DISCONTINUED | OUTPATIENT
Start: 2024-04-04 | End: 2024-04-07 | Stop reason: HOSPADM

## 2024-04-04 RX ORDER — ONDANSETRON 8 MG/1
8 TABLET, ORALLY DISINTEGRATING ORAL EVERY 8 HOURS PRN
Status: DISCONTINUED | OUTPATIENT
Start: 2024-04-04 | End: 2024-04-07 | Stop reason: HOSPADM

## 2024-04-04 RX ORDER — ONDANSETRON HYDROCHLORIDE 2 MG/ML
4 INJECTION, SOLUTION INTRAVENOUS EVERY 8 HOURS PRN
Status: DISCONTINUED | OUTPATIENT
Start: 2024-04-04 | End: 2024-04-07 | Stop reason: HOSPADM

## 2024-04-04 RX ORDER — AMLODIPINE BESYLATE 5 MG/1
10 TABLET ORAL DAILY
Status: DISCONTINUED | OUTPATIENT
Start: 2024-04-05 | End: 2024-04-07 | Stop reason: HOSPADM

## 2024-04-04 RX ORDER — SODIUM CHLORIDE, SODIUM LACTATE, POTASSIUM CHLORIDE, CALCIUM CHLORIDE 600; 310; 30; 20 MG/100ML; MG/100ML; MG/100ML; MG/100ML
INJECTION, SOLUTION INTRAVENOUS CONTINUOUS
Status: DISCONTINUED | OUTPATIENT
Start: 2024-04-04 | End: 2024-04-07

## 2024-04-04 RX ORDER — INSULIN ASPART 100 [IU]/ML
0-5 INJECTION, SOLUTION INTRAVENOUS; SUBCUTANEOUS EVERY 6 HOURS PRN
Status: DISCONTINUED | OUTPATIENT
Start: 2024-04-04 | End: 2024-04-07 | Stop reason: HOSPADM

## 2024-04-04 RX ORDER — PANTOPRAZOLE SODIUM 40 MG/10ML
40 INJECTION, POWDER, LYOPHILIZED, FOR SOLUTION INTRAVENOUS 2 TIMES DAILY
Status: DISCONTINUED | OUTPATIENT
Start: 2024-04-04 | End: 2024-04-07 | Stop reason: HOSPADM

## 2024-04-04 RX ADMIN — CARVEDILOL 6.25 MG: 6.25 TABLET, FILM COATED ORAL at 06:04

## 2024-04-04 RX ADMIN — SODIUM CHLORIDE, POTASSIUM CHLORIDE, SODIUM LACTATE AND CALCIUM CHLORIDE: 600; 310; 30; 20 INJECTION, SOLUTION INTRAVENOUS at 06:04

## 2024-04-04 RX ADMIN — PANTOPRAZOLE SODIUM 40 MG: 40 INJECTION, POWDER, LYOPHILIZED, FOR SOLUTION INTRAVENOUS at 08:04

## 2024-04-04 NOTE — PLAN OF CARE
Pt arrived to floor in stretcher. Aox4. Wife and personal wheelchair at bedside. Up to bathroom with walker, BM was bloody. No distress noted. Fluids started. Questions answered and plan of care reviewed with patient and wife.         Notified NP, NPO at midnight, H&H drawn.

## 2024-04-04 NOTE — ED TRIAGE NOTES
Patient reports dark red blood in stool since yesterday with no c/o rectal or abdominal pain. No previous abdominal surgeries or h/o GI bleeding. Denies weakness, N/V, hematuria, or any other abnormal bleeding. Presents awake, alert. No distress noted.

## 2024-04-04 NOTE — ASSESSMENT & PLAN NOTE
-new onset lower GIB while on chronic OAC, no history of GIB  -at admission HDS and afebrile  -admission labs:  -CBC with WBC 9, H/H 10.9/33.2 (last available value was 15/46 in 2022). Chemistry with , K 4.6, chloride 106, CO2 24, BUN 30, SCr 1.5 (baseline 1-1.3), glucose 109. LFTs WNL.  -GIB pathway initiated  -GI consulted  -continue vital and lab trending  -continue IVF hydration  -trend labs, address/replete electrolytes and transfuse as indicated  -PRN supportive care as indicated  -strict I&Os   -monitor

## 2024-04-04 NOTE — SUBJECTIVE & OBJECTIVE
Past Medical History:   Diagnosis Date    Diabetes mellitus     Hypertension     Stroke        No past surgical history on file.    Review of patient's allergies indicates:  No Known Allergies    No current facility-administered medications on file prior to encounter.     Current Outpatient Medications on File Prior to Encounter   Medication Sig    ALLERGY RELIEF, CETIRIZINE, 10 mg tablet Take 1 tablet by mouth once daily    amLODIPine (NORVASC) 10 MG tablet Take 1 tablet (10 mg total) by mouth once daily.    apixaban (ELIQUIS) 5 mg Tab Take 1 tablet (5 mg total) by mouth 2 (two) times daily.    atorvastatin (LIPITOR) 80 MG tablet Take 1 tablet (80 mg total) by mouth once daily.    carvediloL (COREG) 6.25 MG tablet Take 1 tablet (6.25 mg total) by mouth 2 (two) times daily with meals.    EScitalopram oxalate (LEXAPRO) 5 MG Tab Take 1 tablet (5 mg total) by mouth once daily.    fluticasone propionate (FLONASE) 50 mcg/actuation nasal spray 1 spray (50 mcg total) by Each Nostril route once daily.    losartan (COZAAR) 25 MG tablet Take 1 tablet (25 mg total) by mouth once daily.    losartan (COZAAR) 50 MG tablet Take 1 tablet (50 mg total) by mouth once daily.    meclizine (ANTIVERT) 25 mg tablet Take 1 tablet (25 mg total) by mouth 3 (three) times daily as needed for Dizziness or Nausea.    metFORMIN (GLUCOPHAGE) 500 MG tablet Take 2 tablets (1,000 mg total) by mouth 2 (two) times daily with meals.    pantoprazole (PROTONIX) 40 MG tablet Take 1 tablet (40 mg total) by mouth once daily.    promethazine (PHENERGAN) 25 MG tablet Take 1 tablet (25 mg total) by mouth every 4 (four) hours.     Family History       Problem Relation (Age of Onset)    Asthma Mother    Diabetes Father    Hypertension Father    Stroke Sister          Tobacco Use    Smoking status: Never     Passive exposure: Never    Smokeless tobacco: Never   Substance and Sexual Activity    Alcohol use: No    Drug use: No    Sexual activity: Yes     Partners:  Female     Birth control/protection: None     Review of Systems   Constitutional:  Negative for activity change, appetite change, chills, diaphoresis, fatigue and fever.   Respiratory:  Negative for cough, chest tightness, shortness of breath and wheezing.    Cardiovascular:  Negative for chest pain and leg swelling.   Gastrointestinal:  Positive for blood in stool. Negative for abdominal distention, abdominal pain, constipation, diarrhea, nausea and vomiting.   Genitourinary:  Negative for decreased urine volume, difficulty urinating, flank pain, hematuria and urgency.   Musculoskeletal:  Positive for gait problem (chronic).   Neurological:  Negative for dizziness, syncope, light-headedness and headaches.       Objective:     Vital Signs (Most Recent):  Temp: 98.1 °F (36.7 °C) (04/04/24 1227)  Pulse: 98 (04/04/24 1227)  Resp: 18 (04/04/24 1227)  BP: 116/82 (04/04/24 1227)  SpO2: 95 % (04/04/24 1227) Vital Signs (24h Range):  Temp:  [98.1 °F (36.7 °C)] 98.1 °F (36.7 °C)  Pulse:  [98] 98  Resp:  [18] 18  SpO2:  [95 %] 95 %  BP: (116)/(82) 116/82     Weight: 97.5 kg (215 lb)  Body mass index is 33.67 kg/m².     Physical Exam  Vitals and nursing note reviewed.   Constitutional:       General: He is not in acute distress.     Appearance: Normal appearance. He is well-developed. He is obese. He is not toxic-appearing.   HENT:      Head: Normocephalic and atraumatic.      Mouth/Throat:      Dentition: Normal dentition.   Eyes:      General: Lids are normal.      Extraocular Movements: Extraocular movements intact.      Conjunctiva/sclera: Conjunctivae normal.   Cardiovascular:      Rate and Rhythm: Regular rhythm. Tachycardia present.      Heart sounds: Normal heart sounds. No murmur heard.  Pulmonary:      Effort: Pulmonary effort is normal.      Breath sounds: Normal breath sounds.   Chest:      Chest wall: No tenderness.   Abdominal:      General: Bowel sounds are normal. There is no distension.      Palpations:  Abdomen is soft.      Tenderness: There is no abdominal tenderness.   Musculoskeletal:         General: Normal range of motion.      Cervical back: Neck supple.      Right lower leg: No edema.      Left lower leg: No edema.   Skin:     General: Skin is warm and dry.      Findings: No erythema or rash.   Neurological:      Mental Status: He is alert and oriented to person, place, and time.             Significant Labs: All pertinent labs within the past 24 hours have been reviewed.  CBC:   Recent Labs   Lab 04/04/24  1435   WBC 9.30   HGB 10.9*   HCT 33.2*        CMP:   Recent Labs   Lab 04/04/24  1526      K 4.6      CO2 24      BUN 30*   CREATININE 1.5*   CALCIUM 9.2   PROT 6.4   ALBUMIN 3.5   BILITOT 0.7   ALKPHOS 59   AST 16   ALT 17   ANIONGAP 11       Significant Imaging: I have reviewed all pertinent imaging results/findings within the past 24 hours.

## 2024-04-04 NOTE — PLAN OF CARE
Pt arrived to floor in stretcher. Aox4. Wife and personal wheelchair at bedside. Up to bathroom with walker, BM was bloody. No distress noted. Fluids started. Questions answered and plan of care reviewed with patient and wife.

## 2024-04-04 NOTE — ED PROVIDER NOTES
Encounter Date: 4/4/2024       History     Chief Complaint   Patient presents with    Rectal Bleeding     Rectal bleeding onset yesterday. Pt showed triage nurse a picture, entire toilet filled with blood. Denies abd pain. Reports N/V. Takes eliquis.      58-year-old male, history of hypertension, diabetes, CVA with residual left-sided weakness, on Eliquis, presenting with rectal bleeding.  Patient had 1 episode yesterday and another episode today.  He is loose stools mixed in with bright red blood, no clots.  Episode today where blood filled the toilet.  He has had no melena, he has had no hematemesis or coffee-ground emesis.  He has no abdominal pain.  He has had no recent NSAID use.  He has no history of GI bleeds.  Last dose of Eliquis was this morning.    The history is provided by the patient.     Review of patient's allergies indicates:  No Known Allergies  Past Medical History:   Diagnosis Date    CKD (chronic kidney disease)     Diabetes mellitus     Hypertension     Stroke      No past surgical history on file.  Family History   Problem Relation Age of Onset    Asthma Mother     Diabetes Father     Hypertension Father     Stroke Sister      Social History     Tobacco Use    Smoking status: Never     Passive exposure: Never    Smokeless tobacco: Never   Substance Use Topics    Alcohol use: No    Drug use: No     Review of Systems    Physical Exam     Initial Vitals [04/04/24 1227]   BP Pulse Resp Temp SpO2   116/82 98 18 98.1 °F (36.7 °C) 95 %      MAP       --         Physical Exam    Nursing note and vitals reviewed.  Constitutional: Vital signs are normal. He appears well-developed and well-nourished. He is not diaphoretic.  Non-toxic appearance. He does not appear ill. No distress.   HENT:   Head: Normocephalic and atraumatic.   Mouth/Throat: Mucous membranes are normal. Mucous membranes are not dry.   Eyes: Conjunctivae and lids are normal.   Neck: Neck supple.   Normal range of  motion.  Cardiovascular:  Normal rate.           Pulmonary/Chest: No respiratory distress.   Abdominal: Abdomen is soft. He exhibits no distension. There is no abdominal tenderness. There is no rebound and no guarding.   Genitourinary: Rectum:      Guaiac result positive.   Guaiac positive stool. : Acceptable.   Genitourinary Comments: Small amount of bright red blood on rectal exam     Musculoskeletal:      Cervical back: Normal range of motion and neck supple.     Neurological: He is alert and oriented to person, place, and time.   Left sided weakness   Skin: Skin is warm, dry and intact. No pallor.   Psychiatric: He has a normal mood and affect. His speech is normal and behavior is normal.         ED Course   Procedures  Labs Reviewed   CBC W/ AUTO DIFFERENTIAL - Abnormal; Notable for the following components:       Result Value    RBC 4.03 (*)     Hemoglobin 10.9 (*)     Hematocrit 33.2 (*)     All other components within normal limits   PROTIME-INR - Abnormal; Notable for the following components:    Prothrombin Time 13.4 (*)     All other components within normal limits   COMPREHENSIVE METABOLIC PANEL - Abnormal; Notable for the following components:    BUN 30 (*)     Creatinine 1.5 (*)     eGFR 54 (*)     All other components within normal limits   HEMOGLOBIN   HEMATOCRIT   TYPE & SCREEN   POCT GLUCOSE MONITORING CONTINUOUS          Imaging Results    None          Medications   sodium chloride 0.9% flush 10 mL (has no administration in time range)   melatonin tablet 6 mg (has no administration in time range)   amLODIPine tablet 10 mg (has no administration in time range)   atorvastatin tablet 80 mg (has no administration in time range)   carvediloL tablet 6.25 mg (6.25 mg Oral Given 4/4/24 1817)   EScitalopram oxalate tablet 5 mg (has no administration in time range)   lactated ringers infusion ( Intravenous New Bag 4/4/24 1815)   pantoprazole injection 40 mg (has no administration in time  range)   glucagon (human recombinant) injection 1 mg (has no administration in time range)   insulin aspart U-100 pen 0-5 Units (has no administration in time range)   dextrose 10% bolus 125 mL 125 mL (has no administration in time range)   dextrose 10% bolus 250 mL 250 mL (has no administration in time range)   acetaminophen tablet 650 mg (has no administration in time range)   traMADoL tablet 50 mg (has no administration in time range)   ondansetron disintegrating tablet 8 mg (has no administration in time range)   ondansetron injection 4 mg (has no administration in time range)     Medical Decision Making  Lower GI bleed, hemodynamically stable however patient high-risk as he is on anticoagulation.  Differential diagnosis is diverticular bleed, internal hemorrhoids, AVM, polyp, mass    As part of the work-up for suspected GI bleed, the following interventions will be performed:    IV x 2  Labs, including CBC, INR, T&S  Close monitoring with frequent VS checks  Transfuse if Hgb < 7  Hold anticoagulant/antiplatelet agents  Protonix is suspicion for upper GI bleed with ulcer source  GI consult  Anticipate admission      Amount and/or Complexity of Data Reviewed  External Data Reviewed: notes.  Labs: ordered. Decision-making details documented in ED Course.    Risk  OTC drugs.  Prescription drug management.  Decision regarding hospitalization.              Attending Attestation:         Attending Critical Care:   Critical Care Times:   ==============================================================  Total Critical Care Time - exclusive of procedural time: 30 minutes.  ==============================================================  Critical care was necessary to treat or prevent imminent or life-threatening deterioration of the following conditions: GI bleeding.   Critical care was time spent personally by me on the following activities: obtaining history from patient or relative, examination of patient, review of old  charts, review of x-rays / CT sent with the patient, ordering lab, x-rays, and/or EKG, development of treatment plan with patient or relative, ordering and performing treatments and interventions, evaluation of patient's response to treatment, discussion with consultants and re-evaluation of patient's conition.   Critical Care Condition: potentially life-threatening           ED Course as of 04/04/24 1818   Thu Apr 04, 2024   1444 Hemoglobin(!): 10.9 [AS]   1444 Drop in hgb from 15 to 10 [AS]   1600 I discussed the case with the GI doctor on-call who agrees with the plan for admission, serial CBC.  They will evaluate the patient. [AS]      ED Course User Index  [AS] Alina Parks MD                           Clinical Impression:  Final diagnoses:  [K92.2] GI bleed  [K92.2] Lower GI bleed (Primary)          ED Disposition Condition    Observation Stable                Alina Parks MD  04/04/24 1818

## 2024-04-04 NOTE — ASSESSMENT & PLAN NOTE
-chronic  -SCr baseline 1-1.3  -slightly elevated at admission in setting of GIB and precipitous H/H decline  -IVFs as noted above  -trend H/H, transfuse as indicated  -avoid nephrotoxins and hypotension as able, renally dose medications  -monitor

## 2024-04-04 NOTE — ASSESSMENT & PLAN NOTE
On continuous oral anticoagulation  Dyslipidemia   -history of CVA x 2 with residual deficits  -hold home apixaban due to GIB  -continue home statin  -fall precautions  -monitor

## 2024-04-04 NOTE — ASSESSMENT & PLAN NOTE
-chronic  -controlled  -continue home amlodipine and carvedilol with hold parameters  -hold home losartan due to slightly elevated SCr >>> resume when appropriate  -dose/medication adjustment as appropriate   -monitor

## 2024-04-04 NOTE — FIRST PROVIDER EVALUATION
Emergency Department TeleTriage Encounter Note      CHIEF COMPLAINT    Chief Complaint   Patient presents with    Rectal Bleeding     Rectal bleeding onset yesterday. Pt showed triage nurse a picture, entire toilet filled with blood. Denies abd pain. Reports N/V. Takes eliquis.        VITAL SIGNS   Initial Vitals [04/04/24 1227]   BP Pulse Resp Temp SpO2   116/82 98 18 98.1 °F (36.7 °C) 95 %      MAP       --            ALLERGIES    Review of patient's allergies indicates:  No Known Allergies    PROVIDER TRIAGE NOTE  Verbal consent for the teletriage evaluation was given by the patient at the start of the evaluation.  All efforts will be made to maintain patient's privacy during the evaluation.      This is a teletriage evaluation of a 58 y.o. male presenting to the ED with c/o Rectal bleeding (dark red) that started yesterday.  Also reports vomiting today.  Limited physical exam via telehealth: The patient is awake, alert, answering questions appropriately and is not in respiratory distress.  As the Teletriage provider, I performed an initial assessment and ordered appropriate labs and imaging studies, if any, to facilitate the patient's care once placed in the ED. Once a room is available, care and a full evaluation will be completed by an alternate ED provider.  Any additional orders and the final disposition will be determined by that provider.  All imaging and labs will not be followed-up by the Teletriage Team, including myself.          ORDERS  Labs Reviewed   CBC W/ AUTO DIFFERENTIAL   COMPREHENSIVE METABOLIC PANEL   PROTIME-INR   TYPE & SCREEN       ED Orders (720h ago, onward)      Start Ordered     Status Ordering Provider    04/04/24 1230 04/04/24 1229  Protime-INR  STAT         Ordered DAYSI ESCAMILLA    04/04/24 1229 04/04/24 1229  Saline lock IV (18 ga. or larger)  Once         Ordered DAYSI ESCAMILLA    04/04/24 1229 04/04/24 1229  2nd Saline lock IV (18 ga. or larger)  Once         Ordered  DAYSI ESCAMILLA.    04/04/24 1229 04/04/24 1229  NPO (Ice Chips)  Once         Ordered DAYSI ESCAMILLA    04/04/24 1229 04/04/24 1229  CBC auto differential  STAT         Ordered DAYSI ESCAMILLA    04/04/24 1229 04/04/24 1229  Comprehensive metabolic panel  STAT         Ordered DAYSI ESCAMILLA    04/04/24 1229 04/04/24 1229  Type & Screen  STAT         Ordered DAYSI ESCAMILLA    04/04/24 1229 04/04/24 1229  Vital signs  Once         Ordered DAYSI ESCAMILLA    04/04/24 1229 04/04/24 1229  Cardiac Monitoring - Adult  Continuous        Comments: Notify Physician If:    Ordered DAYSI ESCAMILLA    04/04/24 1229 04/04/24 1229  Pulse Oximetry Continuous  Continuous         Ordered DAYSI ESCAMILLA    04/04/24 1229 04/04/24 1229  EKG 12-lead  Once         Ordered DAYSI ESCAMILLA              Virtual Visit Note: The provider triage portion of this emergency department evaluation and documentation was performed via [employer], a HIPAA-compliant telemedicine application, in concert with a tele-presenter in the room. A face to face patient evaluation with one of my colleagues will occur once the patient is placed in an emergency department room.      DISCLAIMER: This note was prepared with [employer] voice recognition transcription software. Garbled syntax, mangled pronouns, and other bizarre constructions may be attributed to that software system.

## 2024-04-04 NOTE — HPI
HPI by Ana Hale NP:  Mr. Tyson is a 58 YOM with PMHx of HTN. HLD, DM type II, CKD III (baseline SCr 1.0-1.3), and history of CVA x 2 (left pontine stroke 6/2021 and right pontine stroke 7/2021 ) with residual Left sided weakness complicated by post CVA DVT and bilateral PEs with RH strain on OAC with apixaban.     He presents to ED with complaint of rectal bleeding. He reports episodes of BRBPR yesterday and today noting loose stools mixed with bright red blood. He denies clots however reports today blood filled the toilet. He denies history of GIB or recent NSAID use. He endorses last apixaban dose this morning. He denies N/V, hematemesis/coffee ground emesis, abdominal pain, or melena. He denies denies SOB, GUARDADO, CP, abdominal pain, N/V, constipation, urinary symptoms or hematuria, decreased UOP, decreased appetite, changes in PO intake, light headiness, dizziness, seizures, or syncope. He lives with spouse, requires some assistance with ADLs, and uses a walker/wheelchair at baseline.     In the ED he is HDS and afebrile. CBC with WBC 9, H/H 10.9/33.2 (last available value was 15/46 in 2022). Chemistry with , K 4.6, chloride 106, CO2 24, BUN 30, SCr 1.5 (baseline 1-1.3), glucose 109. LFTs WNL.     The patient was placed in observation to the Hospital Medicine Service for further evaluation and treatment.

## 2024-04-04 NOTE — ASSESSMENT & PLAN NOTE
-chronic  -controlled   Latest Reference Range & Units 11/25/22 11:31   Hemoglobin A1C External 4.0 - 5.6 % 6.7 (H)   Estimated Avg Glucose 68 - 131 mg/dL 146 (H)     -hold home metformin  -begin LDSSI  -dose/medication adjustment as appropriate   -monitor accuchecks per NPO protocol and PRN hypoglycemic protocol

## 2024-04-05 PROBLEM — N18.31 STAGE 3A CHRONIC KIDNEY DISEASE: Status: ACTIVE | Noted: 2021-06-09

## 2024-04-05 LAB
ALBUMIN SERPL BCP-MCNC: 3.3 G/DL (ref 3.5–5.2)
ALP SERPL-CCNC: 54 U/L (ref 55–135)
ALT SERPL W/O P-5'-P-CCNC: 16 U/L (ref 10–44)
ANION GAP SERPL CALC-SCNC: 7 MMOL/L (ref 8–16)
AST SERPL-CCNC: 13 U/L (ref 10–40)
BASOPHILS # BLD AUTO: 0.02 K/UL (ref 0–0.2)
BASOPHILS NFR BLD: 0.2 % (ref 0–1.9)
BILIRUB SERPL-MCNC: 0.7 MG/DL (ref 0.1–1)
BUN SERPL-MCNC: 37 MG/DL (ref 6–20)
CALCIUM SERPL-MCNC: 8.8 MG/DL (ref 8.7–10.5)
CHLORIDE SERPL-SCNC: 108 MMOL/L (ref 95–110)
CO2 SERPL-SCNC: 26 MMOL/L (ref 23–29)
CREAT SERPL-MCNC: 1.8 MG/DL (ref 0.5–1.4)
DIFFERENTIAL METHOD BLD: ABNORMAL
EOSINOPHIL # BLD AUTO: 0.2 K/UL (ref 0–0.5)
EOSINOPHIL NFR BLD: 2 % (ref 0–8)
ERYTHROCYTE [DISTWIDTH] IN BLOOD BY AUTOMATED COUNT: 13.8 % (ref 11.5–14.5)
EST. GFR  (NO RACE VARIABLE): 44 ML/MIN/1.73 M^2
GLUCOSE SERPL-MCNC: 119 MG/DL (ref 70–110)
HCT VFR BLD AUTO: 26.6 % (ref 40–54)
HCT VFR BLD AUTO: 31.1 % (ref 40–54)
HGB BLD-MCNC: 10.3 G/DL (ref 14–18)
HGB BLD-MCNC: 8.6 G/DL (ref 14–18)
IMM GRANULOCYTES # BLD AUTO: 0.04 K/UL (ref 0–0.04)
IMM GRANULOCYTES NFR BLD AUTO: 0.4 % (ref 0–0.5)
LYMPHOCYTES # BLD AUTO: 2.5 K/UL (ref 1–4.8)
LYMPHOCYTES NFR BLD: 24.1 % (ref 18–48)
MAGNESIUM SERPL-MCNC: 1.4 MG/DL (ref 1.6–2.6)
MCH RBC QN AUTO: 27 PG (ref 27–31)
MCHC RBC AUTO-ENTMCNC: 32.3 G/DL (ref 32–36)
MCV RBC AUTO: 83 FL (ref 82–98)
MONOCYTES # BLD AUTO: 0.7 K/UL (ref 0.3–1)
MONOCYTES NFR BLD: 7.1 % (ref 4–15)
NEUTROPHILS # BLD AUTO: 6.9 K/UL (ref 1.8–7.7)
NEUTROPHILS NFR BLD: 66.2 % (ref 38–73)
NRBC BLD-RTO: 0 /100 WBC
OHS QRS DURATION: 102 MS
OHS QTC CALCULATION: 435 MS
PLATELET # BLD AUTO: 181 K/UL (ref 150–450)
PMV BLD AUTO: 10.3 FL (ref 9.2–12.9)
POCT GLUCOSE: 109 MG/DL (ref 70–110)
POCT GLUCOSE: 124 MG/DL (ref 70–110)
POCT GLUCOSE: 133 MG/DL (ref 70–110)
POCT GLUCOSE: 156 MG/DL (ref 70–110)
POTASSIUM SERPL-SCNC: 4.4 MMOL/L (ref 3.5–5.1)
PROT SERPL-MCNC: 5.9 G/DL (ref 6–8.4)
RBC # BLD AUTO: 3.19 M/UL (ref 4.6–6.2)
SODIUM SERPL-SCNC: 141 MMOL/L (ref 136–145)
WBC # BLD AUTO: 10.46 K/UL (ref 3.9–12.7)

## 2024-04-05 PROCEDURE — 85014 HEMATOCRIT: CPT | Performed by: NURSE PRACTITIONER

## 2024-04-05 PROCEDURE — 80053 COMPREHEN METABOLIC PANEL: CPT | Performed by: NURSE PRACTITIONER

## 2024-04-05 PROCEDURE — C9113 INJ PANTOPRAZOLE SODIUM, VIA: HCPCS | Performed by: NURSE PRACTITIONER

## 2024-04-05 PROCEDURE — 21400001 HC TELEMETRY ROOM

## 2024-04-05 PROCEDURE — 36415 COLL VENOUS BLD VENIPUNCTURE: CPT | Performed by: NURSE PRACTITIONER

## 2024-04-05 PROCEDURE — 83735 ASSAY OF MAGNESIUM: CPT | Performed by: NURSE PRACTITIONER

## 2024-04-05 PROCEDURE — 85018 HEMOGLOBIN: CPT | Performed by: NURSE PRACTITIONER

## 2024-04-05 PROCEDURE — 25000003 PHARM REV CODE 250: Performed by: HOSPITALIST

## 2024-04-05 PROCEDURE — 94761 N-INVAS EAR/PLS OXIMETRY MLT: CPT

## 2024-04-05 PROCEDURE — 96376 TX/PRO/DX INJ SAME DRUG ADON: CPT

## 2024-04-05 PROCEDURE — 96361 HYDRATE IV INFUSION ADD-ON: CPT

## 2024-04-05 PROCEDURE — 85025 COMPLETE CBC W/AUTO DIFF WBC: CPT | Performed by: NURSE PRACTITIONER

## 2024-04-05 PROCEDURE — 63600175 PHARM REV CODE 636 W HCPCS: Performed by: NURSE PRACTITIONER

## 2024-04-05 PROCEDURE — 25000003 PHARM REV CODE 250: Performed by: NURSE PRACTITIONER

## 2024-04-05 RX ADMIN — CARVEDILOL 6.25 MG: 6.25 TABLET, FILM COATED ORAL at 09:04

## 2024-04-05 RX ADMIN — CARVEDILOL 6.25 MG: 6.25 TABLET, FILM COATED ORAL at 05:04

## 2024-04-05 RX ADMIN — ESCITALOPRAM OXALATE 5 MG: 5 TABLET, FILM COATED ORAL at 09:04

## 2024-04-05 RX ADMIN — ATORVASTATIN CALCIUM 80 MG: 20 TABLET, FILM COATED ORAL at 09:04

## 2024-04-05 RX ADMIN — PANTOPRAZOLE SODIUM 40 MG: 40 INJECTION, POWDER, LYOPHILIZED, FOR SOLUTION INTRAVENOUS at 09:04

## 2024-04-05 RX ADMIN — PANTOPRAZOLE SODIUM 40 MG: 40 INJECTION, POWDER, LYOPHILIZED, FOR SOLUTION INTRAVENOUS at 08:04

## 2024-04-05 RX ADMIN — SODIUM CHLORIDE, POTASSIUM CHLORIDE, SODIUM LACTATE AND CALCIUM CHLORIDE: 600; 310; 30; 20 INJECTION, SOLUTION INTRAVENOUS at 08:04

## 2024-04-05 NOTE — SUBJECTIVE & OBJECTIVE
Interval History: He had a dark BM overnight, none further.  H/H continue to trend down but vitals are stable and he is not symptomatic.    Review of Systems   Constitutional:  Negative for chills and fever.   Respiratory:  Negative for cough and shortness of breath.    Cardiovascular:  Negative for chest pain and palpitations.   Gastrointestinal:  Positive for blood in stool.     Objective:     Vital Signs (Most Recent):  Temp: 98.4 °F (36.9 °C) (04/05/24 0742)  Pulse: 95 (04/05/24 0746)  Resp: 13 (04/05/24 0742)  BP: 110/79 (04/05/24 0742)  SpO2: 95 % (04/05/24 0803) Vital Signs (24h Range):  Temp:  [97.6 °F (36.4 °C)-98.4 °F (36.9 °C)] 98.4 °F (36.9 °C)  Pulse:  [] 95  Resp:  [13-20] 13  SpO2:  [94 %-98 %] 95 %  BP: (104-131)/(59-96) 110/79     Weight: 102.7 kg (226 lb 6.6 oz)  Body mass index is 34.43 kg/m².    Intake/Output Summary (Last 24 hours) at 4/5/2024 0958  Last data filed at 4/5/2024 0851  Gross per 24 hour   Intake --   Output 200 ml   Net -200 ml         Physical Exam  Cardiovascular:      Rate and Rhythm: Normal rate and regular rhythm.      Pulses: Normal pulses.      Heart sounds: Normal heart sounds. No murmur heard.     No gallop.   Pulmonary:      Effort: Pulmonary effort is normal.      Breath sounds: Normal breath sounds.   Abdominal:      General: Bowel sounds are normal.      Palpations: Abdomen is soft.   Skin:     General: Skin is warm and dry.   Neurological:      General: No focal deficit present.      Mental Status: He is alert and oriented to person, place, and time.             Significant Labs: All pertinent labs within the past 24 hours have been reviewed.    Significant Imaging: I have reviewed all pertinent imaging results/findings within the past 24 hours.

## 2024-04-05 NOTE — PLAN OF CARE
Resting comfortably in bed at this time.  VSS on RA and afebrile this shift. No c/o pain. Tolerating regular diet.  Repositions self independently in bed and ambulating around room x1 person assist.   Free from injury or skin breakdown; Fall precautions maintained and call light in reach.  POC updated questions answered and comments acknowledged.  Purposeful hourly rounding completed this shift.

## 2024-04-05 NOTE — CONSULTS
Gastroenterology Consult    4/4/2024 7:54 PM    Patient Name: Meliton Tyson  MRN: [id number]  Admission Date: 4/4/2024  Hospital Length of Stay: 0 days  Code Status: Full Code   Primary Care Physician: Rodrick Angulo MD  Principal Problem:Lower GI bleed  Consulting Physician: Inpatient consult to Gastroenterology  Consult performed by: Bren Esparza MD  Consult ordered by: Alina Parks MD        Reason for consultation: GI bleed    HPI:  Meliton Tyson is a 58 y.o. male with a history of HTN, DM2, CKD3, prior CVA x 2 w/ residual left sided weakness, prior DVT on Eliquis who presented with hematochezia.  He was at the gym yesterday was doing sit ups.  He then had one episode of BRBPR after he got home and then again this morning.  He did have some abdominal cramping prior to the first bloody bowel movement.  No associated CP, SOB, weakness, dizziness or syncope.  He's never had bleeding like this before.  No prior colonoscopy.  He has had annual FIT testing for the last 2 years which has been negative.  HE denies epigastric pain.  1 episode of non-bloody emesis.    Past Medical History:   Diagnosis Date    CKD (chronic kidney disease)     Diabetes mellitus     Hypertension     Stroke    DVT/PE    No past surgical history on file. - none    Social History     Tobacco Use    Smoking status: Never     Passive exposure: Never    Smokeless tobacco: Never   Substance Use Topics    Alcohol use: No    Drug use: No        Family History   Problem Relation Age of Onset    Asthma Mother     Diabetes Father     Hypertension Father     Stroke Sister          Review of patient's allergies indicates:  No Known Allergies      Current Facility-Administered Medications:     acetaminophen tablet 650 mg, 650 mg, Oral, Q4H PRN, Ana Hale NP    [START ON 4/5/2024] amLODIPine tablet 10 mg, 10 mg, Oral, Daily, Ana Hale NP    [START ON 4/5/2024] atorvastatin tablet 80 mg, 80 mg, Oral, Daily,  "Ana Hale NP    carvediloL tablet 6.25 mg, 6.25 mg, Oral, BID WM, Ana Hale NP, 6.25 mg at 04/04/24 1817    dextrose 10% bolus 125 mL 125 mL, 12.5 g, Intravenous, PRN, Ana Hale NP    dextrose 10% bolus 250 mL 250 mL, 25 g, Intravenous, PRN, Ana Hale NP    [START ON 4/5/2024] EScitalopram oxalate tablet 5 mg, 5 mg, Oral, Daily, Ana Hale NP    glucagon (human recombinant) injection 1 mg, 1 mg, Intramuscular, PRN, Ana Hale NP    insulin aspart U-100 pen 0-5 Units, 0-5 Units, Subcutaneous, Q6H PRN, Ana Hale NP    lactated ringers infusion, , Intravenous, Continuous, Ana Hale NP, Last Rate: 75 mL/hr at 04/04/24 1815, New Bag at 04/04/24 1815    melatonin tablet 6 mg, 6 mg, Oral, Nightly PRN, Alina Parks MD    ondansetron disintegrating tablet 8 mg, 8 mg, Oral, Q8H PRN, Ana Hale NP    ondansetron injection 4 mg, 4 mg, Intravenous, Q8H PRN, Ana Hale NP    pantoprazole injection 40 mg, 40 mg, Intravenous, BID, Ana Hale NP    sodium chloride 0.9% flush 10 mL, 10 mL, Intravenous, PRN, Alina Parks MD    traMADoL tablet 50 mg, 50 mg, Oral, Q6H PRN, Ana Hale NP    Review of Systems   Gastrointestinal:  Positive for abdominal pain, blood in stool, nausea and vomiting.   Neurological:  Positive for focal weakness (left side from prior CVA).   All other systems reviewed and are negative.      BP (!) 104/59 (BP Location: Right arm, Patient Position: Lying)   Pulse 103   Temp 98 °F (36.7 °C) (Oral)   Resp 18   Ht 5' 8" (1.727 m)   Wt 102.7 kg (226 lb 6.6 oz)   SpO2 96%   BMI 34.43 kg/m²   Physical Exam  Vitals reviewed.   Constitutional:       General: He is not in acute distress.     Appearance: He is well-developed.   HENT:      Head: Normocephalic and atraumatic.      Right Ear: External ear normal.      Left Ear: External ear normal.      Nose: Nose normal. No " rhinorrhea.      Mouth/Throat:      Mouth: Mucous membranes are moist.      Pharynx: Oropharynx is clear.   Eyes:      General: No scleral icterus.     Conjunctiva/sclera: Conjunctivae normal.      Pupils: Pupils are equal, round, and reactive to light.   Cardiovascular:      Rate and Rhythm: Normal rate and regular rhythm.      Heart sounds: Normal heart sounds. No murmur heard.  Pulmonary:      Effort: Pulmonary effort is normal. No respiratory distress.      Breath sounds: Normal breath sounds.   Abdominal:      General: Bowel sounds are normal. There is no distension.      Palpations: Abdomen is soft.      Tenderness: There is no abdominal tenderness. There is no guarding or rebound.   Musculoskeletal:         General: Normal range of motion.   Skin:     General: Skin is warm and dry.      Findings: No rash.   Neurological:      Mental Status: He is alert and oriented to person, place, and time. Mental status is at baseline.      Motor: Weakness (left side) present.   Psychiatric:         Mood and Affect: Mood normal.         Thought Content: Thought content normal.         Labs:  Lab Results   Component Value Date/Time    WBC 9.30 04/04/2024 02:35 PM    HGB 9.9 (L) 04/04/2024 06:40 PM    HCT 30.5 (L) 04/04/2024 06:40 PM    HCT 39 08/04/2021 05:00 PM     04/04/2024 02:35 PM    MCV 82 04/04/2024 02:35 PM     04/04/2024 03:26 PM    K 4.6 04/04/2024 03:26 PM     04/04/2024 03:26 PM    CO2 24 04/04/2024 03:26 PM    BUN 30 (H) 04/04/2024 03:26 PM    CREATININE 1.5 (H) 04/04/2024 03:26 PM     04/04/2024 03:26 PM    CALCIUM 9.2 04/04/2024 03:26 PM    MG 1.6 08/05/2021 05:48 AM    PHOS 3.1 08/05/2021 05:48 AM    INR 1.2 04/04/2024 02:35 PM    INR 1.3 (H) 06/05/2021 05:39 PM    APTT 26.0 07/09/2021 03:43 AM   ]  Lab Results   Component Value Date/Time    PROT 6.4 04/04/2024 03:26 PM    ALBUMIN 3.5 04/04/2024 03:26 PM    BILITOT 0.7 04/04/2024 03:26 PM    AST 16 04/04/2024 03:26 PM    ALT 17  04/04/2024 03:26 PM    ALKPHOS 59 04/04/2024 03:26 PM   ]    Imaging and Procedures:  I personally reviewed the imaging/procedures below.  None contributory    Assessment:  Meliton Tyson is a 58 y.o. male with a history of HTN, DM2, CKD3, prior CVA x 2 w/ residual left sided weakness, prior DVT on Eliquis who presented with hematochezia.      Plan:  - hold Eliquis  - serial H/H and transfuse if Hgb < 7  - clears today  - if hemodynamically significant blood loss occurs, can get stat CTA (tagged RBC scan if GFR too low for contrast)  - outpatient colonoscopy - suspect diverticular source which typically stops on its own but inpatient colon can be considered if bleeding persists    Bren Esparza MD

## 2024-04-05 NOTE — PLAN OF CARE
MOON Message     Medicare Outpatient and Observation Notification regarding financial responsibility Given to patient/caregiver; Explained to patient/caregiver; Signed/date by patient/caregiver   Date PARIS was signed 4/5/2024   Time PARIS was signed 0922

## 2024-04-05 NOTE — ASSESSMENT & PLAN NOTE
-new onset lower GIB while on chronic OAC, no history of GIB, has not had a colonoscopy  -at admission HDS and afebrile  -admission labs:  -CBC with WBC 9, H/H 10.9/33.2 (last available value was 15/46 in 2022). Chemistry with , K 4.6, chloride 106, CO2 24, BUN 30, SCr 1.5 (baseline 1-1.3), glucose 109. LFTs WNL.  -GIB pathway initiated  -GI consulted, has likely diverticular bleeding.  -continue vital and lab trending  -continue IVF hydration  -trend labs, address/replete electrolytes and transfuse as indicated  -PRN supportive care as indicated  -OK for diet per GI

## 2024-04-05 NOTE — ASSESSMENT & PLAN NOTE
-chronic, has elevated creatinine currently due to GI bleeding  -SCr baseline 1-1.3  -slightly elevated at admission in setting of GIB and precipitous H/H decline  -IVFs as noted above  -trend H/H, transfuse as indicated  -avoid nephrotoxins and hypotension as able, renally dose medications  -monitor

## 2024-04-05 NOTE — ASSESSMENT & PLAN NOTE
On continuous oral anticoagulation  Dyslipidemia   -history of CVA x 2 with residual deficits  -Continue to hold home apixaban due to GIB  -continue home statin  -fall precautions  -monitor

## 2024-04-05 NOTE — PLAN OF CARE
Problem: Adult Inpatient Plan of Care  Goal: Plan of Care Review  Outcome: Ongoing, Progressing  Goal: Patient-Specific Goal (Individualized)  Outcome: Ongoing, Progressing  Goal: Absence of Hospital-Acquired Illness or Injury  Outcome: Ongoing, Progressing  Goal: Optimal Comfort and Wellbeing  Outcome: Ongoing, Progressing  Goal: Readiness for Transition of Care  Outcome: Ongoing, Progressing     Problem: Adjustment to Illness (Gastrointestinal Bleeding)  Goal: Optimal Coping with Acute Illness  Outcome: Ongoing, Progressing     Problem: Bleeding (Gastrointestinal Bleeding)  Goal: Hemostasis  Outcome: Ongoing, Progressing     Problem: Diabetes Comorbidity  Goal: Blood Glucose Level Within Targeted Range  Outcome: Ongoing, Progressing      None

## 2024-04-05 NOTE — HOSPITAL COURSE
Patient was admitted and was seen by GI.  [place] likely to have diverticular bleeding.  Patient had not previously had a colonoscopy and this was recommended as outpatient unless he had further bleeding while here.  H/H were monitored and continued to trend down.  Patient was seen and examined on the day of discharge and was feeling well, tolerating a diet and had a normal BM without gross blood seen on the AM of discharge.  He was prescribed a course of iron tablets and referred to GI for hospital follow up and colonoscopy.  He will need to be seen by his cardiologist as well as his apixaban has been held for the time being.  Note he was in NSR while he was here.

## 2024-04-05 NOTE — ASSESSMENT & PLAN NOTE
-chronic  -controlled  -continue home carvedilol, hold parameters for amlodipine  -hold home losartan due to elevated creatinine, will resume when appropriate  -dose/medication adjustment as appropriate   -monitor

## 2024-04-05 NOTE — PROGRESS NOTES
Gastroenterology Progress Note    Active Hospital Problems    *Lower GI bleed      History of CVA with residual deficit      On continuous oral anticoagulation      Stage 3a chronic kidney disease      Essential hypertension      Dyslipidemia      Type 2 diabetes mellitus, without long-term current use of insulin        Subjective:  Patient seen and examined.  The patient is stable this AM.  Dropped Hb overnight, but no transfusion needed.  Hemodynamically stable.  One darker BM overnight per patient.      Reviews of Systems:  General:  Negative for fever or chills  Cardiovascular:  Negative for chest pain, shortness of breath, palpitations    Physical Exam    Vitals:  Vital Signs (Most Recent):  Temp: 98.4 °F (36.9 °C) (04/05/24 0742)  Pulse: 95 (04/05/24 0746)  Resp: 13 (04/05/24 0742)  BP: 110/79 (04/05/24 0742)  SpO2: 95 % (04/05/24 0803) Vital Signs (24h Range):  Temp:  [97.6 °F (36.4 °C)-98.4 °F (36.9 °C)] 98.4 °F (36.9 °C)  Pulse:  [] 95  Resp:  [13-20] 13  SpO2:  [94 %-98 %] 95 %  BP: (104-131)/(59-96) 110/79     GEN: Well developed, well nourished in no apparent distress   HENT: Normocephalic, anicteric sclera   Cardiovascular: Regular rate and rhythm. No murmurs appreciated.   Chest: Non-labored respirations. Breath sounds equal   Abdomen: soft, NTND, no masses  Psych: Appropriate mood and affect.   Extermities: No C/C/E. 2+ dorsalis pedis pulses bilaterally  Skin: No new visible or palpable lesions.      Medications/Infusions:  Current Facility-Administered Medications   Medication Dose Route Frequency Provider Last Rate Last Admin    acetaminophen tablet 650 mg  650 mg Oral Q4H PRN Ana Hale, JUVENCIO        amLODIPine tablet 10 mg  10 mg Oral Daily Ana Hale NP        atorvastatin tablet 80 mg  80 mg Oral Daily Ana Hale, NP        carvediloL tablet 6.25 mg  6.25 mg Oral BID  Ana Hale, NP   6.25 mg at 04/04/24 1817    dextrose 10% bolus 125 mL 125 mL  12.5 g  Intravenous PRN Ana Hale, NP        dextrose 10% bolus 250 mL 250 mL  25 g Intravenous PRN Ana Hale, JUVENCIO        EScitalopram oxalate tablet 5 mg  5 mg Oral Daily Ana Hale, NP        glucagon (human recombinant) injection 1 mg  1 mg Intramuscular PRN Ana Hale, NP        insulin aspart U-100 pen 0-5 Units  0-5 Units Subcutaneous Q6H PRN Ana Hale, NP        lactated ringers infusion   Intravenous Continuous Ana Hale, NP 75 mL/hr at 04/04/24 1815 New Bag at 04/04/24 1815    melatonin tablet 6 mg  6 mg Oral Nightly PRN Alina Parks MD        ondansetron disintegrating tablet 8 mg  8 mg Oral Q8H PRN Ana Hale, NP        ondansetron injection 4 mg  4 mg Intravenous Q8H PRN Ana Hale, JUVENCIO        pantoprazole injection 40 mg  40 mg Intravenous BID Ana Hale, NP   40 mg at 04/04/24 2021    sodium chloride 0.9% flush 10 mL  10 mL Intravenous PRN Alina Parks MD        traMADoL tablet 50 mg  50 mg Oral Q6H PRN Ana Hale, JUVENCIO           Intake and Output:    Intake/Output Summary (Last 24 hours) at 4/5/2024 0842  Last data filed at 4/4/2024 1811  Gross per 24 hour   Intake --   Output 100 ml   Net -100 ml       Labs:     Latest Reference Range & Units 04/05/24 03:22   Hemoglobin 14.0 - 18.0 g/dL 8.6 (L)   Hematocrit 40.0 - 54.0 % 26.6 (L)   (L): Data is abnormally low    Imaging and other studies:    No new    Assessment:    Patient is a 59 yo with PMHx of HTN, DM2, CKD3, prior CVA x 2 w/ residual left sided weakness, prior DVT on Eliquis who presented with hematochezia.   Dropped Hb, appears to be slowing.     Plan:     Continue holding Eliquis   Serial Hb/Hct, transfuse as needed   OK to advance diet   If hemodynamically significant blood loss occurs, can get stat CTA (tagged RBC scan if GFR too low for contrast)    Will need outpatient colonoscopy.    Discussed with Dr. Arnett

## 2024-04-05 NOTE — CARE UPDATE
Inpatient Upgrade Note    Meliton Tyson has warranted treatment spanning two or more midnights of hospital level care for the management of anemia and GI bleed. He continues to require IV fluids, daily labs, supplemental oxygen, monitoring of vital signs, advancing diet, further evaluation by consultants, and Cardiac Monitoring and IV PPI BID. His condition is also complicated by the following comorbidities: Hypertension, Diabetes, Chronic kidney disease, and CVA X 2 .

## 2024-04-05 NOTE — ASSESSMENT & PLAN NOTE
-Well controlled on metformin at home   Latest Reference Range & Units 11/25/22 11:31   Hemoglobin A1C External 4.0 - 5.6 % 6.7 (H)   Estimated Avg Glucose 68 - 131 mg/dL 146 (H)     -hold home metformin  -begin LDSSI  -dose/medication adjustment as appropriate   -monitor accuchecks per NPO protocol and PRN hypoglycemic protocol

## 2024-04-05 NOTE — PLAN OF CARE
Patient lives with spouse, independent in ADLs, denies HH, has RW, WC, shower chair BP machine for DME. Patient wife will transport patient home.    04/05/24 0942   Discharge Assessment   Assessment Type Discharge Planning Assessment   Confirmed/corrected address, phone number and insurance Yes   Confirmed Demographics Correct on Facesheet   Source of Information patient   Communicated ESTHER with patient/caregiver Date not available/Unable to determine   People in Home spouse   Do you expect to return to your current living situation? Yes   Prior to hospitilization cognitive status: Alert/Oriented   Current cognitive status: Alert/Oriented   Walking or Climbing Stairs Difficulty no   Dressing/Bathing Difficulty no   Equipment Currently Used at Home walker, rolling;shower chair;wheelchair;blood pressure machine   Readmission within 30 days? No   Patient currently being followed by outpatient case management? No   Do you currently have service(s) that help you manage your care at home? No   Do you take prescription medications? Yes   Do you have any problems affording any of your prescribed medications? No   Is the patient taking medications as prescribed? yes   How do you get to doctors appointments? family or friend will provide   Are you on dialysis? No   Do you take coumadin? No   Discharge Plan A Home with family   DME Needed Upon Discharge  none   Discharge Plan discussed with: Patient   Transition of Care Barriers None   Physical Activity   On average, how many days per week do you engage in moderate to strenuous exercise (like a brisk walk)? 3 days   On average, how many minutes do you engage in exercise at this level? 120 min   Financial Resource Strain   How hard is it for you to pay for the very basics like food, housing, medical care, and heating? Not hard   Housing Stability   In the last 12 months, was there a time when you were not able to pay the mortgage or rent on time? N   In the last 12 months, how  many places have you lived? 1   In the last 12 months, was there a time when you did not have a steady place to sleep or slept in a shelter (including now)? N   Transportation Needs   In the past 12 months, has lack of transportation kept you from medical appointments or from getting medications? no   In the past 12 months, has lack of transportation kept you from meetings, work, or from getting things needed for daily living? No   Food Insecurity   Within the past 12 months, you worried that your food would run out before you got the money to buy more. Never true   Within the past 12 months, the food you bought just didn't last and you didn't have money to get more. Never true   Stress   Do you feel stress - tense, restless, nervous, or anxious, or unable to sleep at night because your mind is troubled all the time - these days? Not at all   Social Connections   In a typical week, how many times do you talk on the phone with family, friends, or neighbors? More than 3   How often do you get together with friends or relatives? Once   How often do you attend Christian or Rastafari services? Never   Do you belong to any clubs or organizations such as Christian groups, unions, fraternal or athletic groups, or school groups? No   How often do you attend meetings of the clubs or organizations you belong to? Never   Are you , , , , never , or living with a partner?    Alcohol Use   Q1: How often do you have a drink containing alcohol? Never   Q2: How many drinks containing alcohol do you have on a typical day when you are drinking? None   Q3: How often do you have six or more drinks on one occasion? Never   OTHER   Name(s) of People in Home Corry Tyson (wife)     [facility] ([address])  Initial Discharge Assessment       Primary Care Provider: Rodrick Angulo MD    Admission Diagnosis: GI bleed [K92.2]  Lower GI bleed [K92.2]    Admission Date:  4/4/2024  Expected Discharge Date:     Transition of Care Barriers: (P) None    Payor: [facility] MCARE / Plan: [facility] DUAL COMPLETE HMO SNP / Product Type: Medicare Advantage /     Extended Emergency Contact Information  Primary Emergency Contact: Corry Tyson  Address: [address] [place] [address] [place]  Work Phone: [phone]  Mobile Phone: [phone]  Relation: Spouse    Discharge Plan A: (P) Home with family         [facility] [place] - [address]  [address] [place] [address]  Phone: [phone] Fax: [phone]      Initial Assessment (most recent)       Adult Discharge Assessment - 04/05/24 0928          Discharge Assessment    Assessment Type Discharge Planning Assessment (P)      Confirmed/corrected address, phone number and insurance Yes (P)      Confirmed Demographics Correct on Facesheet (P)      Source of Information patient (P)      Communicated ESTHER with patient/caregiver Date not available/Unable to determine (P)      People in Home spouse (P)      Do you expect to return to your current living situation? Yes (P)      Prior to hospitilization cognitive status: Alert/Oriented (P)      Current cognitive status: Alert/Oriented (P)      Walking or Climbing Stairs Difficulty no (P)      Dressing/Bathing Difficulty no (P)      Equipment Currently Used at Home walker, rolling;shower chair;wheelchair;blood pressure machine (P)      Readmission within 30 days? No (P)      Patient currently being followed by outpatient case management? No (P)      Do you currently have service(s) that help you manage your care at home? No (P)      Do you take prescription medications? Yes (P)      Do you have any problems affording any of your prescribed medications? No (P)      Is the patient taking medications as prescribed? yes (P)      How do you get to doctors appointments? family or friend will provide (P)       Are you on dialysis? No (P)      Do you take coumadin? No (P)      Discharge Plan A Home with family (P)      DME Needed Upon Discharge  none (P)      Discharge Plan discussed with: Patient (P)      Transition of Care Barriers None (P)         Physical Activity    On average, how many days per week do you engage in moderate to strenuous exercise (like a brisk walk)? 3 days (P)      On average, how many minutes do you engage in exercise at this level? 120 min (P)         Financial Resource Strain    How hard is it for you to pay for the very basics like food, housing, medical care, and heating? Not hard at all (P)         Housing Stability    In the last 12 months, was there a time when you were not able to pay the mortgage or rent on time? No (P)      In the last 12 months, how many places have you lived? 1 (P)      In the last 12 months, was there a time when you did not have a steady place to sleep or slept in a shelter (including now)? No (P)         Transportation Needs    In the past 12 months, has lack of transportation kept you from medical appointments or from getting medications? No (P)      In the past 12 months, has lack of transportation kept you from meetings, work, or from getting things needed for daily living? No (P)         Food Insecurity    Within the past 12 months, you worried that your food would run out before you got the money to buy more. Never true (P)      Within the past 12 months, the food you bought just didn't last and you didn't have money to get more. Never true (P)         Stress    Do you feel stress - tense, restless, nervous, or anxious, or unable to sleep at night because your mind is troubled all the time - these days? Not at all (P)         Social Connections    In a typical week, how many times do you talk on the phone with family, friends, or neighbors? More than three times a week (P)      How often do you get together with friends or relatives? Once a week (P)       How often do you attend Methodist or Alevism services? Never (P)      Do you belong to any clubs or organizations such as Methodist groups, unions, fraternal or athletic groups, or school groups? No (P)      How often do you attend meetings of the clubs or organizations you belong to? Never (P)      Are you , , , , never , or living with a partner?  (P)         Alcohol Use    Q1: How often do you have a drink containing alcohol? Never (P)      Q2: How many drinks containing alcohol do you have on a typical day when you are drinking? Patient does not drink (P)      Q3: How often do you have six or more drinks on one occasion? Never (P)         OTHER    Name(s) of People in Home Corry Tyson (wife) (P)

## 2024-04-05 NOTE — PROGRESS NOTES
[facility] ([address] Medicine  Progress Note    Patient Name: Meliton Tyson  MRN: [id number]  Patient Class: OP- Observation   Admission Date: 4/4/2024  Length of Stay: 0 days  Attending Physician: Freda Arnett MD  Primary Care Provider: Rodrick Angulo MD        Subjective:     Principal Problem:Lower GI bleed        HPI:  HPI by Ana Hale NP:  Mr. Tyson is a 58 YOM with PMHx of HTN. HLD, DM type II, CKD III (baseline SCr 1.0-1.3), and history of CVA x 2 (left pontine stroke 6/2021 and right pontine stroke 7/2021 ) with residual Left sided weakness complicated by post CVA DVT and bilateral PEs with RH strain on OAC with apixaban.     He presents to ED with complaint of rectal bleeding. He reports episodes of BRBPR yesterday and today noting loose stools mixed with bright red blood. He denies clots however reports today blood filled the toilet. He denies history of GIB or recent NSAID use. He endorses last apixaban dose this morning. He denies N/V, hematemesis/coffee ground emesis, abdominal pain, or melena. He denies denies SOB, GUARDADO, CP, abdominal pain, N/V, constipation, urinary symptoms or hematuria, decreased UOP, decreased appetite, changes in PO intake, light headiness, dizziness, seizures, or syncope. He lives with spouse, requires some assistance with ADLs, and uses a walker/wheelchair at baseline.     In the ED he is HDS and afebrile. CBC with WBC 9, H/H 10.9/33.2 (last available value was 15/46 in 2022). Chemistry with , K 4.6, chloride 106, CO2 24, BUN 30, SCr 1.5 (baseline 1-1.3), glucose 109. LFTs WNL.     The patient was placed in observation to the Hospital Medicine Service for further evaluation and treatment.     Overview/Hospital Course:  Patient was admitted and was seen by GI.  [place] likely to have diverticular bleeding.  Patient had not previously had a colonoscopy and this was recommended as outpatient unless he had further bleeding while  here.  H/H were monitored and continued to trend down.      Interval History: He had a dark BM overnight, none further.  H/H continue to trend down but vitals are stable and he is not symptomatic.    Review of Systems   Constitutional:  Negative for chills and fever.   Respiratory:  Negative for cough and shortness of breath.    Cardiovascular:  Negative for chest pain and palpitations.   Gastrointestinal:  Positive for blood in stool.     Objective:     Vital Signs (Most Recent):  Temp: 98.4 °F (36.9 °C) (04/05/24 0742)  Pulse: 95 (04/05/24 0746)  Resp: 13 (04/05/24 0742)  BP: 110/79 (04/05/24 0742)  SpO2: 95 % (04/05/24 0803) Vital Signs (24h Range):  Temp:  [97.6 °F (36.4 °C)-98.4 °F (36.9 °C)] 98.4 °F (36.9 °C)  Pulse:  [] 95  Resp:  [13-20] 13  SpO2:  [94 %-98 %] 95 %  BP: (104-131)/(59-96) 110/79     Weight: 102.7 kg (226 lb 6.6 oz)  Body mass index is 34.43 kg/m².    Intake/Output Summary (Last 24 hours) at 4/5/2024 0958  Last data filed at 4/5/2024 0851  Gross per 24 hour   Intake --   Output 200 ml   Net -200 ml         Physical Exam  Cardiovascular:      Rate and Rhythm: Normal rate and regular rhythm.      Pulses: Normal pulses.      Heart sounds: Normal heart sounds. No murmur heard.     No gallop.   Pulmonary:      Effort: Pulmonary effort is normal.      Breath sounds: Normal breath sounds.   Abdominal:      General: Bowel sounds are normal.      Palpations: Abdomen is soft.   Skin:     General: Skin is warm and dry.   Neurological:      General: No focal deficit present.      Mental Status: He is alert and oriented to person, place, and time.             Significant Labs: All pertinent labs within the past 24 hours have been reviewed.    Significant Imaging: I have reviewed all pertinent imaging results/findings within the past 24 hours.    Assessment/Plan:      * Lower GI bleed  -new onset lower GIB while on chronic OAC, no history of GIB, has not had a colonoscopy  -at admission HDS and  afebrile  -admission labs:  -CBC with WBC 9, H/H 10.9/33.2 (last available value was 15/46 in 2022). Chemistry with , K 4.6, chloride 106, CO2 24, BUN 30, SCr 1.5 (baseline 1-1.3), glucose 109. LFTs WNL.  -GIB pathway initiated  -GI consulted, has likely diverticular bleeding.  -continue vital and lab trending  -continue IVF hydration  -trend labs, address/replete electrolytes and transfuse as indicated  -PRN supportive care as indicated  -OK for diet per GI    History of CVA with residual deficit  On continuous oral anticoagulation  Dyslipidemia   -history of CVA x 2 with residual deficits  -Continue to hold home apixaban due to GIB  -continue home statin  -fall precautions  -monitor     Stage 3a chronic kidney disease  -chronic, has elevated creatinine currently due to GI bleeding  -SCr baseline 1-1.3  -slightly elevated at admission in setting of GIB and precipitous H/H decline  -IVFs as noted above  -trend H/H, transfuse as indicated  -avoid nephrotoxins and hypotension as able, renally dose medications  -monitor     Essential hypertension  -chronic  -controlled  -continue home carvedilol, hold parameters for amlodipine  -hold home losartan due to elevated creatinine, will resume when appropriate  -dose/medication adjustment as appropriate   -monitor     Type 2 diabetes mellitus, without long-term current use of insulin  -Well controlled on metformin at home   Latest Reference Range & Units 11/25/22 11:31   Hemoglobin A1C External 4.0 - 5.6 % 6.7 (H)   Estimated Avg Glucose 68 - 131 mg/dL 146 (H)     -hold home metformin  -begin LDSSI  -dose/medication adjustment as appropriate   -monitor accuchecks per NPO protocol and PRN hypoglycemic protocol       VTE Risk Mitigation (From admission, onward)           Ordered     IP VTE HIGH RISK PATIENT  Once         04/04/24 1652     Reason for No Pharmacological VTE Prophylaxis  Once        Question Answer Comment   Reasons: Active Bleeding    Reasons: Risk of  Bleeding        04/04/24 1652     Place sequential compression device  Until discontinued         04/04/24 1652                    Discharge Planning   ESTHER:      Code Status: Full Code   Is the patient medically ready for discharge?:     Reason for patient still in hospital (select all that apply): Patient unstable, Patient trending condition, Laboratory test, Treatment, and Consult recommendations  Discharge Plan A: Home with family                  Freda Palomino MD  Department of Hospital Medicine   [facility] ([address])

## 2024-04-05 NOTE — H&P
[facility] Surg ([address] Medicine  History & Physical    Patient Name: Meliton Tyson  MRN: [id number]  Patient Class: OP- Observation  Admission Date: 4/4/2024  Attending Physician: rFeda Arnett MD   Primary Care Provider: Rodrick Angulo MD    Patient information was obtained from patient, spouse/SO, past medical records, and ER records.     Subjective:     Principal Problem:Lower GI bleed    Chief Complaint:   Chief Complaint   Patient presents with    Rectal Bleeding     Rectal bleeding onset yesterday. Pt showed triage nurse a picture, entire toilet filled with blood. Denies abd pain. Reports N/V. Takes eliquis.         HPI: Mr. Tyson is a 58 YOM with PMHx of HTN. HLD, DM type II, CKD III (baseline SCr 1.0-1.3), and history of CVA x 2 (left pontine stroke 6/2021 and right pontine stroke 7/2021 ) with residual Left sided weakness complicated by post CVA DVT and bilateral PEs with RH strain on OAC with apixaban.     He presents to ED with complaint of rectal bleeding. He reports episodes of BRBPR yesterday and today noting loose stools mixed with bright red blood. He denies clots however reports today blood filled the toilet. He denies history of GIB or recent NSAID use. He endorses last apixaban dose this morning. He denies N/V, hematemesis/coffee ground emesis, abdominal pain, or melena. He denies denies SOB, GUARDADO, CP, abdominal pain, N/V, constipation, urinary symptoms or hematuria, decreased UOP, decreased appetite, changes in PO intake, light headiness, dizziness, seizures, or syncope. He lives with spouse, requires some assistance with ADLs, and uses a walker/wheelchair at baseline.     In the ED he is HDS and afebrile. CBC with WBC 9, H/H 10.9/33.2 (last available value was 15/46 in 2022). Chemistry with , K 4.6, chloride 106, CO2 24, BUN 30, SCr 1.5 (baseline 1-1.3), glucose 109. LFTs WNL.     The patient was placed in observation to the [facility]  Service for further evaluation and treatment.     Past Medical History:   Diagnosis Date    Diabetes mellitus     Hypertension     Stroke        No past surgical history on file.    Review of patient's allergies indicates:  No Known Allergies    No current facility-administered medications on file prior to encounter.     Current Outpatient Medications on File Prior to Encounter   Medication Sig    ALLERGY RELIEF, CETIRIZINE, 10 mg tablet Take 1 tablet by mouth once daily    amLODIPine (NORVASC) 10 MG tablet Take 1 tablet (10 mg total) by mouth once daily.    apixaban (ELIQUIS) 5 mg Tab Take 1 tablet (5 mg total) by mouth 2 (two) times daily.    atorvastatin (LIPITOR) 80 MG tablet Take 1 tablet (80 mg total) by mouth once daily.    carvediloL (COREG) 6.25 MG tablet Take 1 tablet (6.25 mg total) by mouth 2 (two) times daily with meals.    EScitalopram oxalate (LEXAPRO) 5 MG Tab Take 1 tablet (5 mg total) by mouth once daily.    fluticasone propionate (FLONASE) 50 mcg/actuation nasal spray 1 spray (50 mcg total) by Each Nostril route once daily.    losartan (COZAAR) 25 MG tablet Take 1 tablet (25 mg total) by mouth once daily.    losartan (COZAAR) 50 MG tablet Take 1 tablet (50 mg total) by mouth once daily.    meclizine (ANTIVERT) 25 mg tablet Take 1 tablet (25 mg total) by mouth 3 (three) times daily as needed for Dizziness or Nausea.    metFORMIN (GLUCOPHAGE) 500 MG tablet Take 2 tablets (1,000 mg total) by mouth 2 (two) times daily with meals.    pantoprazole (PROTONIX) 40 MG tablet Take 1 tablet (40 mg total) by mouth once daily.    promethazine (PHENERGAN) 25 MG tablet Take 1 tablet (25 mg total) by mouth every 4 (four) hours.     Family History       Problem Relation (Age of Onset)    Asthma Mother    Diabetes Father    Hypertension Father    Stroke Sister          Tobacco Use    Smoking status: Never     Passive exposure: Never    Smokeless tobacco: Never   Substance and Sexual Activity    Alcohol use: No     Drug use: No    Sexual activity: Yes     Partners: Female     Birth control/protection: None     Review of Systems   Constitutional:  Negative for activity change, appetite change, chills, diaphoresis, fatigue and fever.   Respiratory:  Negative for cough, chest tightness, shortness of breath and wheezing.    Cardiovascular:  Negative for chest pain and leg swelling.   Gastrointestinal:  Positive for blood in stool. Negative for abdominal distention, abdominal pain, constipation, diarrhea, nausea and vomiting.   Genitourinary:  Negative for decreased urine volume, difficulty urinating, flank pain, hematuria and urgency.   Musculoskeletal:  Positive for gait problem (chronic).   Neurological:  Negative for dizziness, syncope, light-headedness and headaches.       Objective:     Vital Signs (Most Recent):  Temp: 98.1 °F (36.7 °C) (04/04/24 1227)  Pulse: 98 (04/04/24 1227)  Resp: 18 (04/04/24 1227)  BP: 116/82 (04/04/24 1227)  SpO2: 95 % (04/04/24 1227) Vital Signs (24h Range):  Temp:  [98.1 °F (36.7 °C)] 98.1 °F (36.7 °C)  Pulse:  [98] 98  Resp:  [18] 18  SpO2:  [95 %] 95 %  BP: (116)/(82) 116/82     Weight: 97.5 kg (215 lb)  Body mass index is 33.67 kg/m².     Physical Exam  Vitals and nursing note reviewed.   Constitutional:       General: He is not in acute distress.     Appearance: Normal appearance. He is well-developed. He is obese. He is not toxic-appearing.   HENT:      Head: Normocephalic and atraumatic.      Mouth/Throat:      Dentition: Normal dentition.   Eyes:      General: Lids are normal.      Extraocular Movements: Extraocular movements intact.      Conjunctiva/sclera: Conjunctivae normal.   Cardiovascular:      Rate and Rhythm: Regular rhythm. Tachycardia present.      Heart sounds: Normal heart sounds. No murmur heard.  Pulmonary:      Effort: Pulmonary effort is normal.      Breath sounds: Normal breath sounds.   Chest:      Chest wall: No tenderness.   Abdominal:      General: Bowel sounds are  normal. There is no distension.      Palpations: Abdomen is soft.      Tenderness: There is no abdominal tenderness.   Musculoskeletal:         General: Normal range of motion.      Cervical back: Neck supple.      Right lower leg: No edema.      Left lower leg: No edema.   Skin:     General: Skin is warm and dry.      Findings: No erythema or rash.   Neurological:      Mental Status: He is alert and oriented to person, place, and time.             Significant Labs: All pertinent labs within the past 24 hours have been reviewed.  CBC:   Recent Labs   Lab 04/04/24  1435   WBC 9.30   HGB 10.9*   HCT 33.2*        CMP:   Recent Labs   Lab 04/04/24  1526      K 4.6      CO2 24      BUN 30*   CREATININE 1.5*   CALCIUM 9.2   PROT 6.4   ALBUMIN 3.5   BILITOT 0.7   ALKPHOS 59   AST 16   ALT 17   ANIONGAP 11       Significant Imaging: I have reviewed all pertinent imaging results/findings within the past 24 hours.  Assessment/Plan:     * Lower GI bleed  -new onset lower GIB while on chronic OAC, no history of GIB  -at admission HDS and afebrile  -admission labs:  -CBC with WBC 9, H/H 10.9/33.2 (last available value was 15/46 in 2022). Chemistry with , K 4.6, chloride 106, CO2 24, BUN 30, SCr 1.5 (baseline 1-1.3), glucose 109. LFTs WNL.  -GIB pathway initiated  -GI consulted  -continue vital and lab trending  -continue IVF hydration  -trend labs, address/replete electrolytes and transfuse as indicated  -PRN supportive care as indicated  -strict I&Os   -monitor     Essential hypertension  -chronic  -controlled  -continue home amlodipine and carvedilol with hold parameters  -hold home losartan due to slightly elevated SCr >>> resume when appropriate  -dose/medication adjustment as appropriate   -monitor     History of CVA with residual deficit  On continuous oral anticoagulation  Dyslipidemia   -history of CVA x 2 with residual deficits  -hold home apixaban due to GIB  -continue home statin  -fall  precautions  -monitor     Stage 3 chronic kidney disease  -chronic  -SCr baseline 1-1.3  -slightly elevated at admission in setting of GIB and precipitous H/H decline  -IVFs as noted above  -trend H/H, transfuse as indicated  -avoid nephrotoxins and hypotension as able, renally dose medications  -monitor     Type 2 diabetes mellitus, without long-term current use of insulin  -chronic  -controlled   Latest Reference Range & Units 11/25/22 11:31   Hemoglobin A1C External 4.0 - 5.6 % 6.7 (H)   Estimated Avg Glucose 68 - 131 mg/dL 146 (H)     -hold home metformin  -begin LDSSI  -dose/medication adjustment as appropriate   -monitor accuchecks per NPO protocol and PRN hypoglycemic protocol       VTE Risk Mitigation (From admission, onward)           Ordered     IP VTE HIGH RISK PATIENT  Once         04/04/24 1652     Reason for No Pharmacological VTE Prophylaxis  Once        Question Answer Comment   Reasons: Active Bleeding    Reasons: Risk of Bleeding        04/04/24 1652     Place sequential compression device  Until discontinued         04/04/24 1652                    On 04/04/2024, patient placed in hospital observation services.      Ana Hale, DNP, AG-ACNP, BC  Department of Hospital Medicine  [facility]

## 2024-04-06 LAB
ALBUMIN SERPL BCP-MCNC: 3.2 G/DL (ref 3.5–5.2)
ANION GAP SERPL CALC-SCNC: 7 MMOL/L (ref 8–16)
BASOPHILS # BLD AUTO: 0.02 K/UL (ref 0–0.2)
BASOPHILS NFR BLD: 0.3 % (ref 0–1.9)
BUN SERPL-MCNC: 26 MG/DL (ref 6–20)
CALCIUM SERPL-MCNC: 8.4 MG/DL (ref 8.7–10.5)
CHLORIDE SERPL-SCNC: 107 MMOL/L (ref 95–110)
CO2 SERPL-SCNC: 24 MMOL/L (ref 23–29)
CREAT SERPL-MCNC: 1.3 MG/DL (ref 0.5–1.4)
DIFFERENTIAL METHOD BLD: ABNORMAL
EOSINOPHIL # BLD AUTO: 0.4 K/UL (ref 0–0.5)
EOSINOPHIL NFR BLD: 4.6 % (ref 0–8)
ERYTHROCYTE [DISTWIDTH] IN BLOOD BY AUTOMATED COUNT: 13.8 % (ref 11.5–14.5)
EST. GFR  (NO RACE VARIABLE): >60 ML/MIN/1.73 M^2
GLUCOSE SERPL-MCNC: 101 MG/DL (ref 70–110)
HCT VFR BLD AUTO: 23.5 % (ref 40–54)
HGB BLD-MCNC: 7.6 G/DL (ref 14–18)
IMM GRANULOCYTES # BLD AUTO: 0.02 K/UL (ref 0–0.04)
IMM GRANULOCYTES NFR BLD AUTO: 0.3 % (ref 0–0.5)
LYMPHOCYTES # BLD AUTO: 2 K/UL (ref 1–4.8)
LYMPHOCYTES NFR BLD: 26.4 % (ref 18–48)
MCH RBC QN AUTO: 27.1 PG (ref 27–31)
MCHC RBC AUTO-ENTMCNC: 32.3 G/DL (ref 32–36)
MCV RBC AUTO: 84 FL (ref 82–98)
MONOCYTES # BLD AUTO: 0.6 K/UL (ref 0.3–1)
MONOCYTES NFR BLD: 7.7 % (ref 4–15)
NEUTROPHILS # BLD AUTO: 4.6 K/UL (ref 1.8–7.7)
NEUTROPHILS NFR BLD: 60.7 % (ref 38–73)
NRBC BLD-RTO: 0 /100 WBC
PHOSPHATE SERPL-MCNC: 3 MG/DL (ref 2.7–4.5)
PLATELET # BLD AUTO: 169 K/UL (ref 150–450)
PMV BLD AUTO: 10.2 FL (ref 9.2–12.9)
POCT GLUCOSE: 107 MG/DL (ref 70–110)
POCT GLUCOSE: 124 MG/DL (ref 70–110)
POCT GLUCOSE: 160 MG/DL (ref 70–110)
POCT GLUCOSE: 97 MG/DL (ref 70–110)
POTASSIUM SERPL-SCNC: 3.7 MMOL/L (ref 3.5–5.1)
RBC # BLD AUTO: 2.8 M/UL (ref 4.6–6.2)
SODIUM SERPL-SCNC: 138 MMOL/L (ref 136–145)
WBC # BLD AUTO: 7.64 K/UL (ref 3.9–12.7)

## 2024-04-06 PROCEDURE — C9113 INJ PANTOPRAZOLE SODIUM, VIA: HCPCS | Performed by: NURSE PRACTITIONER

## 2024-04-06 PROCEDURE — 36415 COLL VENOUS BLD VENIPUNCTURE: CPT | Performed by: HOSPITALIST

## 2024-04-06 PROCEDURE — 80069 RENAL FUNCTION PANEL: CPT | Performed by: HOSPITALIST

## 2024-04-06 PROCEDURE — 21400001 HC TELEMETRY ROOM

## 2024-04-06 PROCEDURE — 25000003 PHARM REV CODE 250: Performed by: NURSE PRACTITIONER

## 2024-04-06 PROCEDURE — 85025 COMPLETE CBC W/AUTO DIFF WBC: CPT | Performed by: HOSPITALIST

## 2024-04-06 PROCEDURE — 94761 N-INVAS EAR/PLS OXIMETRY MLT: CPT

## 2024-04-06 PROCEDURE — 25000003 PHARM REV CODE 250: Performed by: HOSPITALIST

## 2024-04-06 PROCEDURE — 63600175 PHARM REV CODE 636 W HCPCS: Performed by: NURSE PRACTITIONER

## 2024-04-06 RX ADMIN — PANTOPRAZOLE SODIUM 40 MG: 40 INJECTION, POWDER, LYOPHILIZED, FOR SOLUTION INTRAVENOUS at 09:04

## 2024-04-06 RX ADMIN — ATORVASTATIN CALCIUM 80 MG: 20 TABLET, FILM COATED ORAL at 09:04

## 2024-04-06 RX ADMIN — CARVEDILOL 6.25 MG: 6.25 TABLET, FILM COATED ORAL at 05:04

## 2024-04-06 RX ADMIN — CARVEDILOL 6.25 MG: 6.25 TABLET, FILM COATED ORAL at 09:04

## 2024-04-06 RX ADMIN — ESCITALOPRAM OXALATE 5 MG: 5 TABLET, FILM COATED ORAL at 09:04

## 2024-04-06 RX ADMIN — SODIUM CHLORIDE, POTASSIUM CHLORIDE, SODIUM LACTATE AND CALCIUM CHLORIDE: 600; 310; 30; 20 INJECTION, SOLUTION INTRAVENOUS at 09:04

## 2024-04-06 NOTE — PROGRESS NOTES
"Subjective:       Patient ID: Meliton Tyson is a 58 y.o. male.    Chief Complaint:  Rectal Bleeding (Rectal bleeding onset yesterday. Pt showed triage nurse a picture, entire toilet filled with blood. Denies abd pain. Reports N/V. Takes eliquis. )       History of Present Illness  Pt denies any gi bled    Review of Systems  Cardiovascular: negative      Objective:     Vitals:    04/06/24 0238 04/06/24 0502 04/06/24 0738 04/06/24 1109   BP:  131/72 132/77 125/72   BP Location:  Right arm Right arm Right arm   Patient Position:  Lying Lying Lying   Pulse: 77 90 87 91   Resp:  16 19 19   Temp:  98 °F (36.7 °C) 98 °F (36.7 °C) 98.1 °F (36.7 °C)   TempSrc:  Oral Oral Oral   SpO2:  95% 96% 95%   Weight:       Height:          Abd soft non tender    Data Review   Recent Results (from the past 336 hour(s))   CBC auto differential    Collection Time: 04/06/24  8:27 AM   Result Value Ref Range    WBC 7.64 3.90 - 12.70 K/uL    Hemoglobin 7.6 (L) 14.0 - 18.0 g/dL    Hematocrit 23.5 (L) 40.0 - 54.0 %    Platelets 169 150 - 450 K/uL   CBC auto differential    Collection Time: 04/05/24  3:22 AM   Result Value Ref Range    WBC 10.46 3.90 - 12.70 K/uL    Hemoglobin 8.6 (L) 14.0 - 18.0 g/dL    Hematocrit 26.6 (L) 40.0 - 54.0 %    Platelets 181 150 - 450 K/uL   CBC auto differential    Collection Time: 04/04/24  2:35 PM   Result Value Ref Range    WBC 9.30 3.90 - 12.70 K/uL    Hemoglobin 10.9 (L) 14.0 - 18.0 g/dL    Hematocrit 33.2 (L) 40.0 - 54.0 %    Platelets 211 150 - 450 K/uL      Recent Labs   Lab 04/04/24  1435   INR 1.2     Recent Labs   Lab 04/04/24  1526 04/05/24  0321 04/06/24  0826    141 138   K 4.6 4.4 3.7    108 107   CO2 24 26 24   BUN 30* 37* 26*   CREATININE 1.5* 1.8* 1.3   CALCIUM 9.2 8.8 8.4*   PROT 6.4 5.9*  --    BILITOT 0.7 0.7  --    ALKPHOS 59 54*  --    ALT 17 16  --    AST 16 13  --       Lab Results   Component Value Date    HEPCAB Negative 08/04/2021    No results found for: "AFP"   No " "results found for: "CEA"   Recent Labs   Lab 04/04/24  1435   INR 1.2        Assessment:     1. Lower GI bleed    2. GI bleed        Plan:     Serial h/h  Bleeding scan if rebleeds  Out Pt colnoscopy    "

## 2024-04-06 NOTE — PLAN OF CARE
Problem: Adult Inpatient Plan of Care  Goal: Plan of Care Review  Outcome: Ongoing, Progressing  Goal: Patient-Specific Goal (Individualized)  Outcome: Ongoing, Progressing  Goal: Absence of Hospital-Acquired Illness or Injury  Outcome: Ongoing, Progressing  Goal: Optimal Comfort and Wellbeing  Outcome: Ongoing, Progressing  Goal: Readiness for Transition of Care  Outcome: Ongoing, Progressing     Problem: Adjustment to Illness (Gastrointestinal Bleeding)  Goal: Optimal Coping with Acute Illness  Outcome: Ongoing, Progressing   Patient AAO x. 4. VSS. Nad. No complaints of pain or nausea. No bloody stools reported. Q2 rounding completed. Safety maintained. Will continue to monitor.

## 2024-04-06 NOTE — PROGRESS NOTES
[facility] Surg ([address] Medicine  Progress Note    Patient Name: Meliton Tyson  MRN: [id number]  Patient Class: IP- Inpatient   Admission Date: 4/4/2024  Length of Stay: 1 days  Attending Physician: Freda Arnett MD  Primary Care Provider: Rodrick Angulo MD        Subjective:     Principal Problem:Lower GI bleed        HPI:  HPI by Ana Hale NP:  Mr. Tyson is a 58 YOM with PMHx of HTN. HLD, DM type II, CKD III (baseline SCr 1.0-1.3), and history of CVA x 2 (left pontine stroke 6/2021 and right pontine stroke 7/2021 ) with residual Left sided weakness complicated by post CVA DVT and bilateral PEs with RH strain on OAC with apixaban.     He presents to ED with complaint of rectal bleeding. He reports episodes of BRBPR yesterday and today noting loose stools mixed with bright red blood. He denies clots however reports today blood filled the toilet. He denies history of GIB or recent NSAID use. He endorses last apixaban dose this morning. He denies N/V, hematemesis/coffee ground emesis, abdominal pain, or melena. He denies denies SOB, GUARDADO, CP, abdominal pain, N/V, constipation, urinary symptoms or hematuria, decreased UOP, decreased appetite, changes in PO intake, light headiness, dizziness, seizures, or syncope. He lives with spouse, requires some assistance with ADLs, and uses a walker/wheelchair at baseline.     In the ED he is HDS and afebrile. CBC with WBC 9, H/H 10.9/33.2 (last available value was 15/46 in 2022). Chemistry with , K 4.6, chloride 106, CO2 24, BUN 30, SCr 1.5 (baseline 1-1.3), glucose 109. LFTs WNL.     The patient was placed in observation to the Hospital Medicine Service for further evaluation and treatment.     Overview/Hospital Course:  Patient was admitted and was seen by GI.  [place] likely to have diverticular bleeding.  Patient had not previously had a colonoscopy and this was recommended as outpatient unless he had further bleeding while  here.  H/H were monitored and continued to trend down.      Interval History: He reports having a brown BM without evidence of blood.  Anxious to be discharged.  Labs came back late and H/H continue to trend down.  Renal function improving.    Review of Systems   Constitutional:  Negative for chills and fever.   Respiratory:  Negative for cough and shortness of breath.    Cardiovascular:  Negative for chest pain and palpitations.   Gastrointestinal:  Negative for blood in stool.     Objective:     Vital Signs (Most Recent):  Temp: 98 °F (36.7 °C) (04/06/24 0738)  Pulse: 87 (04/06/24 0738)  Resp: 19 (04/06/24 0738)  BP: 132/77 (04/06/24 0738)  SpO2: 96 % (04/06/24 0738) Vital Signs (24h Range):  Temp:  [97.9 °F (36.6 °C)-98.3 °F (36.8 °C)] 98 °F (36.7 °C)  Pulse:  [77-90] 87  Resp:  [16-19] 19  SpO2:  [94 %-96 %] 96 %  BP: (117-132)/(63-78) 132/77     Weight: 102.7 kg (226 lb 6.6 oz)  Body mass index is 34.43 kg/m².    Intake/Output Summary (Last 24 hours) at 4/6/2024 1056  Last data filed at 4/6/2024 0653  Gross per 24 hour   Intake 2586.87 ml   Output 625 ml   Net 1961.87 ml         Physical Exam  Cardiovascular:      Rate and Rhythm: Normal rate and regular rhythm.      Pulses: Normal pulses.      Heart sounds: Normal heart sounds. No murmur heard.     No gallop.   Pulmonary:      Effort: Pulmonary effort is normal.      Breath sounds: Normal breath sounds.   Abdominal:      General: Bowel sounds are normal.      Palpations: Abdomen is soft.   Skin:     General: Skin is warm and dry.   Neurological:      Mental Status: He is alert and oriented to person, place, and time.      Comments: Left sided weakness at baseline.             Significant Labs: All pertinent labs within the past 24 hours have been reviewed.    Significant Imaging: I have reviewed all pertinent imaging results/findings within the past 24 hours.    Assessment/Plan:      * Lower GI bleed  -new onset lower GIB while on chronic OAC, no history of  GIB, has not had a colonoscopy  -at admission HDS and afebrile  -admission labs:  -CBC with WBC 9, H/H 10.9/33.2 (last available value was 15/46 in 2022). Chemistry with , K 4.6, chloride 106, CO2 24, BUN 30, SCr 1.5 (baseline 1-1.3), glucose 109. LFTs WNL.  -GIB pathway initiated  -GI consulted, has likely diverticular bleeding.  -continue vital and lab trending  -continue IVF hydration  -trend labs, address/replete electrolytes and transfuse as indicated  -PRN supportive care as indicated  -OK for diet per GI    History of CVA with residual deficit  On continuous oral anticoagulation  Dyslipidemia   -history of CVA x 2 with residual deficits  -Continue to hold home apixaban due to GIB  -continue home statin  -fall precautions  -monitor     Stage 3a chronic kidney disease  -chronic, has elevated creatinine currently due to GI bleeding  -SCr baseline 1-1.3  -slightly elevated at admission in setting of GIB and precipitous H/H decline  -IVFs as noted above  -trend H/H, transfuse as indicated  -avoid nephrotoxins and hypotension as able, renally dose medications  -monitor     Essential hypertension  -chronic  -controlled  -continue home carvedilol, hold parameters for amlodipine  -hold home losartan due to elevated creatinine, will resume when appropriate  -dose/medication adjustment as appropriate   -monitor     Type 2 diabetes mellitus, without long-term current use of insulin  -Well controlled on metformin at home   Latest Reference Range & Units 11/25/22 11:31   Hemoglobin A1C External 4.0 - 5.6 % 6.7 (H)   Estimated Avg Glucose 68 - 131 mg/dL 146 (H)     -hold home metformin  -begin LDSSI  -dose/medication adjustment as appropriate   -monitor accuchecks per NPO protocol and PRN hypoglycemic protocol       VTE Risk Mitigation (From admission, onward)           Ordered     IP VTE HIGH RISK PATIENT  Once         04/04/24 1652     Reason for No Pharmacological VTE Prophylaxis  Once        Question Answer  Comment   Reasons: Active Bleeding    Reasons: Risk of Bleeding        04/04/24 1652     Place sequential compression device  Until discontinued         04/04/24 1652                    Discharge Planning   ESTHER: 4/7/2024     Code Status: Full Code   Is the patient medically ready for discharge?:     Reason for patient still in hospital (select all that apply): Patient unstable, Patient trending condition, Laboratory test, Treatment, and Consult recommendations  Discharge Plan A: Home with family                  Freda Palomino MD  Department of Hospital Medicine   [facility] Surg ([address])

## 2024-04-06 NOTE — PLAN OF CARE
Problem: Adult Inpatient Plan of Care  Goal: Plan of Care Review  Outcome: Ongoing, Progressing  Flowsheets (Taken 4/6/2024 8591)  Plan of Care Reviewed With: patient

## 2024-04-06 NOTE — SUBJECTIVE & OBJECTIVE
Interval History: He reports having a brown BM without evidence of blood.  Anxious to be discharged.  Labs came back late and H/H continue to trend down.  Renal function improving.    Review of Systems   Constitutional:  Negative for chills and fever.   Respiratory:  Negative for cough and shortness of breath.    Cardiovascular:  Negative for chest pain and palpitations.   Gastrointestinal:  Negative for blood in stool.     Objective:     Vital Signs (Most Recent):  Temp: 98 °F (36.7 °C) (04/06/24 0738)  Pulse: 87 (04/06/24 0738)  Resp: 19 (04/06/24 0738)  BP: 132/77 (04/06/24 0738)  SpO2: 96 % (04/06/24 0738) Vital Signs (24h Range):  Temp:  [97.9 °F (36.6 °C)-98.3 °F (36.8 °C)] 98 °F (36.7 °C)  Pulse:  [77-90] 87  Resp:  [16-19] 19  SpO2:  [94 %-96 %] 96 %  BP: (117-132)/(63-78) 132/77     Weight: 102.7 kg (226 lb 6.6 oz)  Body mass index is 34.43 kg/m².    Intake/Output Summary (Last 24 hours) at 4/6/2024 1056  Last data filed at 4/6/2024 0653  Gross per 24 hour   Intake 2586.87 ml   Output 625 ml   Net 1961.87 ml         Physical Exam  Cardiovascular:      Rate and Rhythm: Normal rate and regular rhythm.      Pulses: Normal pulses.      Heart sounds: Normal heart sounds. No murmur heard.     No gallop.   Pulmonary:      Effort: Pulmonary effort is normal.      Breath sounds: Normal breath sounds.   Abdominal:      General: Bowel sounds are normal.      Palpations: Abdomen is soft.   Skin:     General: Skin is warm and dry.   Neurological:      Mental Status: He is alert and oriented to person, place, and time.      Comments: Left sided weakness at baseline.             Significant Labs: All pertinent labs within the past 24 hours have been reviewed.    Significant Imaging: I have reviewed all pertinent imaging results/findings within the past 24 hours.

## 2024-04-07 VITALS
RESPIRATION RATE: 18 BRPM | BODY MASS INDEX: 34.32 KG/M2 | WEIGHT: 226.44 LBS | HEIGHT: 68 IN | DIASTOLIC BLOOD PRESSURE: 69 MMHG | TEMPERATURE: 99 F | OXYGEN SATURATION: 97 % | HEART RATE: 76 BPM | SYSTOLIC BLOOD PRESSURE: 121 MMHG

## 2024-04-07 LAB
ALBUMIN SERPL BCP-MCNC: 3.1 G/DL (ref 3.5–5.2)
ANION GAP SERPL CALC-SCNC: 8 MMOL/L (ref 8–16)
BASOPHILS # BLD AUTO: 0.01 K/UL (ref 0–0.2)
BASOPHILS NFR BLD: 0.2 % (ref 0–1.9)
BUN SERPL-MCNC: 17 MG/DL (ref 6–20)
CALCIUM SERPL-MCNC: 8.1 MG/DL (ref 8.7–10.5)
CHLORIDE SERPL-SCNC: 108 MMOL/L (ref 95–110)
CO2 SERPL-SCNC: 23 MMOL/L (ref 23–29)
CREAT SERPL-MCNC: 1.2 MG/DL (ref 0.5–1.4)
DIFFERENTIAL METHOD BLD: ABNORMAL
EOSINOPHIL # BLD AUTO: 0.3 K/UL (ref 0–0.5)
EOSINOPHIL NFR BLD: 4.8 % (ref 0–8)
ERYTHROCYTE [DISTWIDTH] IN BLOOD BY AUTOMATED COUNT: 14 % (ref 11.5–14.5)
EST. GFR  (NO RACE VARIABLE): >60 ML/MIN/1.73 M^2
GLUCOSE SERPL-MCNC: 112 MG/DL (ref 70–110)
HCT VFR BLD AUTO: 22.2 % (ref 40–54)
HGB BLD-MCNC: 7.2 G/DL (ref 14–18)
IMM GRANULOCYTES # BLD AUTO: 0.01 K/UL (ref 0–0.04)
IMM GRANULOCYTES NFR BLD AUTO: 0.2 % (ref 0–0.5)
LYMPHOCYTES # BLD AUTO: 2.2 K/UL (ref 1–4.8)
LYMPHOCYTES NFR BLD: 36.2 % (ref 18–48)
MCH RBC QN AUTO: 27.1 PG (ref 27–31)
MCHC RBC AUTO-ENTMCNC: 32.4 G/DL (ref 32–36)
MCV RBC AUTO: 84 FL (ref 82–98)
MONOCYTES # BLD AUTO: 0.5 K/UL (ref 0.3–1)
MONOCYTES NFR BLD: 8 % (ref 4–15)
NEUTROPHILS # BLD AUTO: 3 K/UL (ref 1.8–7.7)
NEUTROPHILS NFR BLD: 50.6 % (ref 38–73)
NRBC BLD-RTO: 0 /100 WBC
PHOSPHATE SERPL-MCNC: 2.9 MG/DL (ref 2.7–4.5)
PLATELET # BLD AUTO: 161 K/UL (ref 150–450)
PMV BLD AUTO: 10.2 FL (ref 9.2–12.9)
POCT GLUCOSE: 125 MG/DL (ref 70–110)
POCT GLUCOSE: 141 MG/DL (ref 70–110)
POTASSIUM SERPL-SCNC: 3.3 MMOL/L (ref 3.5–5.1)
RBC # BLD AUTO: 2.66 M/UL (ref 4.6–6.2)
SODIUM SERPL-SCNC: 139 MMOL/L (ref 136–145)
WBC # BLD AUTO: 6 K/UL (ref 3.9–12.7)

## 2024-04-07 PROCEDURE — 80069 RENAL FUNCTION PANEL: CPT | Performed by: HOSPITALIST

## 2024-04-07 PROCEDURE — 25000003 PHARM REV CODE 250: Performed by: HOSPITALIST

## 2024-04-07 PROCEDURE — 63600175 PHARM REV CODE 636 W HCPCS: Performed by: NURSE PRACTITIONER

## 2024-04-07 PROCEDURE — 85025 COMPLETE CBC W/AUTO DIFF WBC: CPT | Performed by: HOSPITALIST

## 2024-04-07 PROCEDURE — 94761 N-INVAS EAR/PLS OXIMETRY MLT: CPT

## 2024-04-07 PROCEDURE — 36415 COLL VENOUS BLD VENIPUNCTURE: CPT | Performed by: HOSPITALIST

## 2024-04-07 PROCEDURE — 25000003 PHARM REV CODE 250: Performed by: NURSE PRACTITIONER

## 2024-04-07 PROCEDURE — C9113 INJ PANTOPRAZOLE SODIUM, VIA: HCPCS | Performed by: NURSE PRACTITIONER

## 2024-04-07 RX ORDER — FERROUS SULFATE 325(65) MG
325 TABLET, DELAYED RELEASE (ENTERIC COATED) ORAL DAILY
Qty: 90 TABLET | Refills: 0 | Status: SHIPPED | OUTPATIENT
Start: 2024-04-07

## 2024-04-07 RX ADMIN — TRAMADOL HYDROCHLORIDE 50 MG: 50 TABLET, COATED ORAL at 10:04

## 2024-04-07 RX ADMIN — ATORVASTATIN CALCIUM 80 MG: 20 TABLET, FILM COATED ORAL at 10:04

## 2024-04-07 RX ADMIN — AMLODIPINE BESYLATE 10 MG: 5 TABLET ORAL at 10:04

## 2024-04-07 RX ADMIN — ESCITALOPRAM OXALATE 5 MG: 5 TABLET, FILM COATED ORAL at 10:04

## 2024-04-07 RX ADMIN — CARVEDILOL 6.25 MG: 6.25 TABLET, FILM COATED ORAL at 10:04

## 2024-04-07 RX ADMIN — PANTOPRAZOLE SODIUM 40 MG: 40 INJECTION, POWDER, LYOPHILIZED, FOR SOLUTION INTRAVENOUS at 10:04

## 2024-04-07 NOTE — PLAN OF CARE
Problem: Adult Inpatient Plan of Care  Goal: Plan of Care Review  Outcome: Ongoing, Progressing     Problem: Adult Inpatient Plan of Care  Goal: Optimal Comfort and Wellbeing  Outcome: Ongoing, Progressing     Problem: Diabetes Comorbidity  Goal: Blood Glucose Level Within Targeted Range  Outcome: Ongoing, Progressing      Plan of care reviewed and acknowledged with patient. Had no major complaints that were not handled with PRN/scheduled medications. Bed in low, locked position. Call light within easy reach. Instructed to call for needs and assistance. Frequent checks made to maintain safety, comfort, positioning, bathroom needs, and pain.

## 2024-04-07 NOTE — ASSESSMENT & PLAN NOTE
-new onset lower GIB while on chronic OAC, no history of GIB, has not had a colonoscopy  -at admission HDS and afebrile  -admission labs:  -CBC with WBC 9, H/H 10.9/33.2 (last available value was 15/46 in 2022). Chemistry with , K 4.6, chloride 106, CO2 24, BUN 30, SCr 1.5 (baseline 1-1.3), glucose 109. LFTs WNL.  -GIB pathway initiated  -GI consulted, has likely diverticular bleeding.  -continue vital and lab trending  -continue IVF hydration  -trend labs, address/replete electrolytes and transfuse as indicated  -PRN supportive care as indicated  -OK for diet per GI  -No further bleeding noted but H/H continue very gradual downward trend - ?partly dilutional - will d/c IVF

## 2024-04-07 NOTE — DISCHARGE SUMMARY
[facility]urg ([address] Medicine  Discharge Summary      Patient Name: Meliton Tyson  MRN: [id number]  BERTO: [id number]  Patient Class: IP- Inpatient  Admission Date: 4/4/2024  Hospital Length of Stay: 2 days  Discharge Date and Time:  04/07/2024 9:44 AM  Attending Physician: Freda Arnett MD   Discharging Provider: Freda Arnett MD  Primary Care Provider: Rodrick Angulo MD    Primary Care Team: Networked reference to record PCT     HPI:   HPI by Ana Hale NP:  Mr. Tyson is a 58 YOM with PMHx of HTN. HLD, DM type II, CKD III (baseline SCr 1.0-1.3), and history of CVA x 2 (left pontine stroke 6/2021 and right pontine stroke 7/2021 ) with residual Left sided weakness complicated by post CVA DVT and bilateral PEs with RH strain on OAC with apixaban.     He presents to ED with complaint of rectal bleeding. He reports episodes of BRBPR yesterday and today noting loose stools mixed with bright red blood. He denies clots however reports today blood filled the toilet. He denies history of GIB or recent NSAID use. He endorses last apixaban dose this morning. He denies N/V, hematemesis/coffee ground emesis, abdominal pain, or melena. He denies denies SOB, GUARDADO, CP, abdominal pain, N/V, constipation, urinary symptoms or hematuria, decreased UOP, decreased appetite, changes in PO intake, light headiness, dizziness, seizures, or syncope. He lives with spouse, requires some assistance with ADLs, and uses a walker/wheelchair at baseline.     In the ED he is HDS and afebrile. CBC with WBC 9, H/H 10.9/33.2 (last available value was 15/46 in 2022). Chemistry with , K 4.6, chloride 106, CO2 24, BUN 30, SCr 1.5 (baseline 1-1.3), glucose 109. LFTs WNL.     The patient was placed in observation to the Hospital Medicine Service for further evaluation and treatment.           Hospital Course:   Patient was admitted and was seen by GI.  Tracy likely to have diverticular bleeding.   Patient had not previously had a colonoscopy and this was recommended as outpatient unless he had further bleeding while here.  H/H were monitored and continued to trend down.  Patient was seen and examined on the day of discharge and was feeling well, tolerating a diet and had a normal BM without gross blood seen on the AM of discharge.  He was prescribed a course of iron tablets and referred to GI for hospital follow up and colonoscopy.  He will need to be seen by his cardiologist as well as his apixaban has been held for the time being.  Note he was in NSR while he was here.     Goals of Care Treatment Preferences:  Code Status: Full Code      Consults:   Consults (From admission, onward)          Status Ordering Provider     Inpatient consult to Gastroenterology  Once        Provider:  Bren Esparza MD Completed SHREVES, ASHLEY E.              Final Active Diagnoses:    Diagnosis Date Noted POA    PRINCIPAL PROBLEM:  Lower GI bleed [K92.2] 04/04/2024 Yes    History of CVA with residual deficit [I69.30] 04/04/2024 Not Applicable    On continuous oral anticoagulation [Z79.01] 04/04/2024 Not Applicable    Stage 3a chronic kidney disease [N18.31] 06/09/2021 Yes    Essential hypertension [I10]  Yes    Dyslipidemia [E78.5] 06/03/2021 Yes    Type 2 diabetes mellitus, without long-term current use of insulin [E11.9] 08/12/2013 Yes      Problems Resolved During this Admission:       Discharged Condition: stable    Disposition: Home or Self Care    Follow Up:   Follow-up Information       Rodrick Angulo MD Follow up.    Specialty: Family Medicine  Why: Follow up in 1-2 weeks  Contact information:  [address]  [facility] [address]  [phone]               Kd Verdin MD Follow up.    Specialty: Otolaryngology  Why: As scheduled  Contact information:  [address]  [facility] [id number] [phone]               Aide Metzger DNP Follow up.    Specialty: Family  Medicine  Why: Follow up in 1-2 weeks for evaluation of anticoagulation given GI bleed  Contact information:  [address] [address] [phone]               [facility] Follow up.    Specialty: Gastroenterology  Why: Follow up for the next available appointment  Contact information:  [address]  [facility] [address] [phone]  Additional information:  [facility] - Main Building, 4th Floor   Please park in [facility] and take [facility] elevator                         Patient Instructions:      Ambulatory referral/consult to Gastroenterology   Standing Status: Future   Referral Priority: Routine Referral Type: Consultation   Referral Reason: Specialty Services Required   Requested Specialty: Gastroenterology   Number of Visits Requested: 1     Diet diabetic     Activity as tolerated         Medications:  Reconciled Home Medications:      Medication List        START taking these medications      ferrous sulfate 325 (65 FE) MG EC tablet  Take 1 tablet (325 mg total) by mouth once daily.            CONTINUE taking these medications      ALLERGY RELIEF (CETIRIZINE) 10 MG tablet  Generic drug: cetirizine  Take 1 tablet by mouth once daily     amLODIPine 10 MG tablet  Commonly known as: NORVASC  Take 1 tablet (10 mg total) by mouth once daily.     atorvastatin 80 MG tablet  Commonly known as: LIPITOR  Take 1 tablet (80 mg total) by mouth once daily.     carvediloL 6.25 MG tablet  Commonly known as: COREG  Take 1 tablet (6.25 mg total) by mouth 2 (two) times daily with meals.     EScitalopram oxalate 5 MG Tab  Commonly known as: LEXAPRO  Take 1 tablet (5 mg total) by mouth once daily.     fluticasone propionate 50 mcg/actuation nasal spray  Commonly known as: FLONASE  1 spray (50 mcg total) by Each Nostril route once daily.     losartan 25 MG tablet  Commonly known as: COZAAR  Take 1 tablet (25 mg total) by mouth once daily.     meclizine 25  mg tablet  Commonly known as: ANTIVERT  Take 1 tablet (25 mg total) by mouth 3 (three) times daily as needed for Dizziness or Nausea.     metFORMIN 500 MG tablet  Commonly known as: GLUCOPHAGE  Take 2 tablets (1,000 mg total) by mouth 2 (two) times daily with meals.     pantoprazole 40 MG tablet  Commonly known as: PROTONIX  Take 1 tablet (40 mg total) by mouth once daily.            STOP taking these medications      apixaban 5 mg Tab  Commonly known as: ELIQUIS     promethazine 25 MG tablet  Commonly known as: PHENERGAN              Time spent on the discharge of patient: >30 minutes         Freda Palomino MD  Department of Hospital Medicine  [facility] ([address])

## 2024-04-07 NOTE — NURSING
Pt. Being discharged home per MD orders. VSS, pt afebrile and no c/o pain at this time. Reviewed discharge instruction, follow-up instructions, and med with pt/wife, Removed PIV access. Dry gauze/ tape placed to site, CDI. Ride to be by family upon discharge and will transport per W/C.

## 2024-04-07 NOTE — PLAN OF CARE
Patient will need a hospital f/u. Patient will transport home at discharge with his wife. All CM needs have been met.   04/07/24 1018   Final Note   Assessment Type Final Discharge Note   Hospital Resources/Appts/Education Provided Provided patient/caregiver with written discharge plan information;Appointment suggestion unavailable   Post-Acute Status   Discharge Delays None known at this time     [facility] ([address])  Discharge Final Note    Primary Care Provider: Rodrick Angulo MD    Expected Discharge Date: 4/7/2024    Final Discharge Note (most recent)       Final Note - 04/07/24 1018          Final Note    Assessment Type Final Discharge Note (P)      Hospital Resources/Appts/Education Provided Provided patient/caregiver with written discharge plan information;Appointment suggestion unavailable (P)         Post-Acute Status    Discharge Delays None known at this time (P)                      Important Message from Medicare             Contact Info       Rodrick Angulo MD   Specialty: Family Medicine   Relationship: PCP - General    [address]  [facility] [address]  [facility] [address]   Phone: [phone]       Next Steps: Follow up    Instructions: Follow up in 1-2 weeks    Kd Verdin MD   Specialty: Otolaryngology    [address]  [place] [address]   Phone: [phone]       Next Steps: Follow up    Instructions: As scheduled    Aide Metzger DNP   Specialty: Family Medicine    [address] [facility] [address]   Phone: [phone]       Next Steps: Follow up    Instructions: Follow up in 1-2 weeks for evaluation of anticoagulation given GI bleed    [facility] [address]   Specialty: Gastroenterology    [address]  [place] [address]   Phone: [phone]       Next Steps: Follow up    Instructions: Follow up for the next available appointment

## 2024-04-12 ENCOUNTER — PATIENT MESSAGE (OUTPATIENT)
Dept: INTERNAL MEDICINE | Facility: CLINIC | Age: 59
End: 2024-04-12
Payer: MEDICARE

## 2024-04-12 ENCOUNTER — TELEPHONE (OUTPATIENT)
Dept: INTERNAL MEDICINE | Facility: CLINIC | Age: 59
End: 2024-04-12
Payer: MEDICARE

## 2024-04-12 DIAGNOSIS — I63.9 ACUTE ISCHEMIC STROKE: ICD-10-CM

## 2024-04-12 DIAGNOSIS — I26.99 BILATERAL PULMONARY EMBOLISM: ICD-10-CM

## 2024-04-12 DIAGNOSIS — I10 ESSENTIAL HYPERTENSION: Primary | ICD-10-CM

## 2024-04-12 NOTE — TELEPHONE ENCOUNTER
"C3 nurse successfully contacted and spoke with Meliton Tyson (Spouse) after patient had selected option 5 on PD tracker requesting assistance with a hospital follow up call. The patient's spouse is expressing concerns that Mr. Coelho was advised to follow up with gastroenterology and cardiology "within 2 weeks," however, soonest available appointment offered was "in June." C3 nurse unable to assist with scheduling these appointment as no physician was assigned at this time to schedule with. C3 nurse was able to provide patient's spouse with [facility] contact information: [phone] to better assist with scheduling these appointments. Patient's spouse denies any further needs at this time.   "

## 2024-04-12 NOTE — TELEPHONE ENCOUNTER
Received message from patient stating he was seen in the ED for GI Bleed.  States ER doctor instructed him to follow up with Gastroenterologist and also Cardiology.  He was taken off of Eliquis.  Patient is requesting a referral for Gastro and Cardiology.  The patient also has questions about being taken off of Eliquis, stating that he was informed by Dr. Angulo not to stop taking it.  Referrals pended.

## 2024-04-15 ENCOUNTER — TELEPHONE (OUTPATIENT)
Dept: INTERNAL MEDICINE | Facility: CLINIC | Age: 59
End: 2024-04-15
Payer: MEDICARE

## 2024-04-23 ENCOUNTER — PATIENT OUTREACH (OUTPATIENT)
Dept: ADMINISTRATIVE | Facility: HOSPITAL | Age: 59
End: 2024-04-23
Payer: MEDICARE

## 2024-04-23 DIAGNOSIS — E11.9 TYPE 2 DIABETES MELLITUS WITHOUT COMPLICATION, WITHOUT LONG-TERM CURRENT USE OF INSULIN: Primary | ICD-10-CM

## 2024-04-23 NOTE — PROGRESS NOTES
Health Maintenance Due   Topic Date Due    Pneumococcal Vaccines (Age 0-64) (1 of 2 - PCV) Never done    Shingles Vaccine (1 of 2) Never done    Eye Exam  05/18/2020    Influenza Vaccine (1) Never done    COVID-19 Vaccine (3 - 2023-24 season) 09/01/2023    Health Maintenance reviewed, Updated and Links triggered. Eye exam due, Patient stated external exam will be done.  Patient wife stated she will call with eye doctor name. ([place]) 4/23/24

## 2024-04-30 ENCOUNTER — OFFICE VISIT (OUTPATIENT)
Dept: CARDIOLOGY | Facility: CLINIC | Age: 59
End: 2024-04-30
Payer: MEDICARE

## 2024-04-30 VITALS — HEART RATE: 84 BPM | OXYGEN SATURATION: 91 % | SYSTOLIC BLOOD PRESSURE: 149 MMHG | DIASTOLIC BLOOD PRESSURE: 95 MMHG

## 2024-04-30 DIAGNOSIS — I26.99 BILATERAL PULMONARY EMBOLISM: ICD-10-CM

## 2024-04-30 DIAGNOSIS — I10 ESSENTIAL HYPERTENSION: ICD-10-CM

## 2024-04-30 DIAGNOSIS — I63.9 ACUTE ISCHEMIC STROKE: ICD-10-CM

## 2024-04-30 PROCEDURE — 3066F NEPHROPATHY DOC TX: CPT | Mod: CPTII,S$GLB,, | Performed by: INTERNAL MEDICINE

## 2024-04-30 PROCEDURE — 3061F NEG MICROALBUMINURIA REV: CPT | Mod: CPTII,S$GLB,, | Performed by: INTERNAL MEDICINE

## 2024-04-30 PROCEDURE — 3077F SYST BP >= 140 MM HG: CPT | Mod: CPTII,S$GLB,, | Performed by: INTERNAL MEDICINE

## 2024-04-30 PROCEDURE — 3044F HG A1C LEVEL LT 7.0%: CPT | Mod: CPTII,S$GLB,, | Performed by: INTERNAL MEDICINE

## 2024-04-30 PROCEDURE — 4010F ACE/ARB THERAPY RXD/TAKEN: CPT | Mod: CPTII,S$GLB,, | Performed by: INTERNAL MEDICINE

## 2024-04-30 PROCEDURE — 1111F DSCHRG MED/CURRENT MED MERGE: CPT | Mod: CPTII,S$GLB,, | Performed by: INTERNAL MEDICINE

## 2024-04-30 PROCEDURE — 1160F RVW MEDS BY RX/DR IN RCRD: CPT | Mod: CPTII,S$GLB,, | Performed by: INTERNAL MEDICINE

## 2024-04-30 PROCEDURE — 99215 OFFICE O/P EST HI 40 MIN: CPT | Mod: S$GLB,,, | Performed by: INTERNAL MEDICINE

## 2024-04-30 PROCEDURE — 99999 PR PBB SHADOW E&M-EST. PATIENT-LVL III: CPT | Mod: PBBFAC,,, | Performed by: INTERNAL MEDICINE

## 2024-04-30 PROCEDURE — 1159F MED LIST DOCD IN RCRD: CPT | Mod: CPTII,S$GLB,, | Performed by: INTERNAL MEDICINE

## 2024-04-30 PROCEDURE — 3080F DIAST BP >= 90 MM HG: CPT | Mod: CPTII,S$GLB,, | Performed by: INTERNAL MEDICINE

## 2024-04-30 RX ORDER — LOSARTAN POTASSIUM 50 MG/1
50 TABLET ORAL DAILY
COMMUNITY

## 2024-04-30 NOTE — PROGRESS NOTES
[facility]    Chief Complaint  Chief Complaint   Patient presents with    Hospital Follow Up       HPI:    Sent here for follow-up after hospital stay with a lower gastrointestinal bleed.  He has been chronically anticoagulated under the direction of Dr. Angulo for a history of a pulmonary embolism in July 2021.  Pulmonary embolism occurred when he was in inpatient rehabilitation after a stroke.  Patient's wife provides a lot of the history.  She states that the plans were initially for anticoagulation for about a year.  It has been continued indefinitely.  Perhaps that was because he was in a wheelchair and had limited mobility at the time.  He now reports getting around with a walker at home.  Came to this appointment in a wheelchair but only uses that when traveling long distances.  After his most recent discharge, he is to follow-up with GI for a colonoscopy.  Etiology of his bleeding has not been determined or corrected.    Medications  Current Outpatient Medications   Medication Sig Dispense Refill    ALLERGY RELIEF, CETIRIZINE, 10 mg tablet Take 1 tablet by mouth once daily 90 tablet 3    amLODIPine (NORVASC) 10 MG tablet Take 1 tablet (10 mg total) by mouth once daily. 90 tablet 3    atorvastatin (LIPITOR) 80 MG tablet Take 1 tablet (80 mg total) by mouth once daily. 90 tablet 3    carvediloL (COREG) 6.25 MG tablet Take 1 tablet (6.25 mg total) by mouth 2 (two) times daily with meals. 180 tablet 3    EScitalopram oxalate (LEXAPRO) 5 MG Tab Take 1 tablet (5 mg total) by mouth once daily. 90 tablet 5    ferrous sulfate 325 (65 FE) MG EC tablet Take 1 tablet (325 mg total) by mouth once daily. 90 tablet 0    fluticasone propionate (FLONASE) 50 mcg/actuation nasal spray 1 spray (50 mcg total) by Each Nostril route once daily. 18.2 mL 11    losartan (COZAAR) 25 MG tablet Take 1 tablet (25 mg total) by mouth once daily. 90 tablet 3    losartan (COZAAR) 50 MG tablet Take 50 mg by mouth once daily.       meclizine (ANTIVERT) 25 mg tablet Take 1 tablet (25 mg total) by mouth 3 (three) times daily as needed for Dizziness or Nausea. 30 tablet 0    metFORMIN (GLUCOPHAGE) 500 MG tablet Take 2 tablets (1,000 mg total) by mouth 2 (two) times daily with meals. 360 tablet 0    pantoprazole (PROTONIX) 40 MG tablet Take 1 tablet (40 mg total) by mouth once daily. (Patient not taking: Reported on 4/30/2024) 90 tablet 3     No current facility-administered medications for this visit.        History  Past Medical History:   Diagnosis Date    CKD (chronic kidney disease)     Diabetes mellitus     Hypertension     Stroke      No past surgical history on file.  Social History     Socioeconomic History    Marital status:    Tobacco Use    Smoking status: Never     Passive exposure: Never    Smokeless tobacco: Never   Substance and Sexual Activity    Alcohol use: No    Drug use: No    Sexual activity: Yes     Partners: Female     Birth control/protection: None     Social Determinants of Health     Financial Resource Strain: Low Risk  (4/5/2024)    Overall Financial Resource Strain (CARDIA)     Difficulty of Paying Living Expenses: Not hard at all   Food Insecurity: No Food Insecurity (4/5/2024)    Hunger Vital Sign     Worried About Running Out of Food in the Last Year: Never true     Ran Out of Food in the Last Year: Never true   Transportation Needs: No Transportation Needs (4/5/2024)    PRAPARE - Transportation     Lack of Transportation (Medical): No     Lack of Transportation (Non-Medical): No   Physical Activity: Sufficiently Active (4/5/2024)    Exercise Vital Sign     Days of Exercise per Week: 3 days     Minutes of Exercise per Session: 120 min   Stress: No Stress Concern Present (4/5/2024)    [employer] [place] [employer] - Occupational Stress Questionnaire     Feeling of Stress : Not at all   Social Connections: Moderately Isolated (4/5/2024)    Social Connection and Isolation Panel [NHANES]      Frequency of Communication with Friends and Family: More than three times a week     Frequency of Social Gatherings with Friends and Family: Once a week     Attends Restoration Services: Never     Active Member of Clubs or Organizations: No     Attends Club or Organization Meetings: Never     Marital Status:    Housing Stability: Low Risk  (4/5/2024)    Housing Stability Vital Sign     Unable to Pay for Housing in the Last Year: No     Number of Places Lived in the Last Year: 1     Unstable Housing in the Last Year: No     Family History   Problem Relation Name Age of Onset    Asthma Mother Macy Tyson     Diabetes Father Meliton Tyson     Hypertension Father Meliton Tyson     Stroke Sister Lindsay Gomes         Allergies  Review of patient's allergies indicates:  No Known Allergies    Review of Systems   Review of Systems   Constitutional: Negative for malaise/fatigue, weight gain and weight loss.   Eyes:  Negative for visual disturbance.   Cardiovascular:  Negative for chest pain, claudication, cyanosis, dyspnea on exertion, irregular heartbeat, leg swelling, near-syncope, orthopnea, palpitations, paroxysmal nocturnal dyspnea and syncope.   Respiratory:  Negative for cough, hemoptysis, shortness of breath, sleep disturbances due to breathing and wheezing.    Hematologic/Lymphatic: Negative for bleeding problem. Does not bruise/bleed easily.   Skin:  Negative for poor wound healing.   Musculoskeletal:  Negative for muscle cramps and myalgias.   Gastrointestinal:  Negative for abdominal pain, anorexia, diarrhea, heartburn, hematemesis, hematochezia, melena, nausea and vomiting.   Genitourinary:  Negative for hematuria and nocturia.   Neurological:  Negative for excessive daytime sleepiness, dizziness, focal weakness, light-headedness and weakness.       Physical Exam  Vitals:    04/30/24 0931   BP: (!) 149/95   Pulse: 84     Wt Readings from Last 1 Encounters:   04/04/24 102.7 kg (226 lb 6.6  oz)     Physical Exam  Vitals and nursing note reviewed.   Constitutional:       General: He is not in acute distress.     Appearance: He is obese. He is not toxic-appearing or diaphoretic.   HENT:      Head: Normocephalic and atraumatic.      Mouth/Throat:      Lips: Pink.      Mouth: Mucous membranes are moist.   Eyes:      General: No scleral icterus.     Conjunctiva/sclera: Conjunctivae normal.   Neck:      Thyroid: No thyromegaly.      Vascular: No carotid bruit, hepatojugular reflux or JVD.      Trachea: Trachea normal.   Cardiovascular:      Rate and Rhythm: Normal rate and regular rhythm. No extrasystoles are present.     Chest Wall: PMI is not displaced.      Pulses:           Carotid pulses are 2+ on the right side and 2+ on the left side.       Radial pulses are 2+ on the right side and 2+ on the left side.        Dorsalis pedis pulses are 2+ on the right side and 2+ on the left side.        Posterior tibial pulses are 2+ on the right side and 2+ on the left side.      Heart sounds: S1 normal and S2 normal. No murmur heard.     No friction rub. No S3 or S4 sounds.   Pulmonary:      Effort: Pulmonary effort is normal. No tachypnea, bradypnea, accessory muscle usage or respiratory distress.      Breath sounds: Normal breath sounds and air entry. No decreased breath sounds, wheezing, rhonchi or rales.   Abdominal:      General: Bowel sounds are normal. There is no distension or abdominal bruit.      Palpations: Abdomen is soft. There is no hepatomegaly, splenomegaly or pulsatile mass.      Tenderness: There is no abdominal tenderness.   Musculoskeletal:         General: No tenderness or deformity.      Right lower leg: No edema.      Left lower leg: No edema.   Skin:     General: Skin is warm and dry.      Capillary Refill: Capillary refill takes less than 2 seconds.      Coloration: Skin is not cyanotic or pale.      Nails: There is no clubbing.   Neurological:      Mental Status: He is alert.    Psychiatric:         Attention and Perception: Attention normal.         Behavior: Behavior is cooperative.         Labs  Admission on 04/04/2024, Discharged on 04/07/2024   Component Date Value Ref Range Status    WBC 04/04/2024 9.30  3.90 - 12.70 K/uL Final    RBC 04/04/2024 4.03 (L)  4.60 - 6.20 M/uL Final    Hemoglobin 04/04/2024 10.9 (L)  14.0 - 18.0 g/dL Final    Hematocrit 04/04/2024 33.2 (L)  40.0 - 54.0 % Final    MCV 04/04/2024 82  82 - 98 fL Final    MCH 04/04/2024 27.0  27.0 - 31.0 pg Final    MCHC 04/04/2024 32.8  32.0 - 36.0 g/dL Final    RDW 04/04/2024 13.8  11.5 - 14.5 % Final    Platelets 04/04/2024 211  150 - 450 K/uL Final    MPV 04/04/2024 10.4  9.2 - 12.9 fL Final    Immature Granulocytes 04/04/2024 0.3  0.0 - 0.5 % Final    Gran # (ANC) 04/04/2024 6.3  1.8 - 7.7 K/uL Final    Immature Grans (Abs) 04/04/2024 0.03  0.00 - 0.04 K/uL Final    Comment: Mild elevation in immature granulocytes is non specific and   can be seen in a variety of conditions including stress response,   acute inflammation, trauma and pregnancy. Correlation with other   laboratory and clinical findings is essential.      Lymph # 04/04/2024 2.1  1.0 - 4.8 K/uL Final    Mono # 04/04/2024 0.7  0.3 - 1.0 K/uL Final    Eos # 04/04/2024 0.2  0.0 - 0.5 K/uL Final    Baso # 04/04/2024 0.01  0.00 - 0.20 K/uL Final    nRBC 04/04/2024 0  0 /100 WBC Final    Gran % 04/04/2024 68.0  38.0 - 73.0 % Final    Lymph % 04/04/2024 22.0  18.0 - 48.0 % Final    Mono % 04/04/2024 7.1  4.0 - 15.0 % Final    Eosinophil % 04/04/2024 2.5  0.0 - 8.0 % Final    Basophil % 04/04/2024 0.1  0.0 - 1.9 % Final    Differential Method 04/04/2024 Automated   Final    Group & Rh 04/04/2024 B POS   Final    Indirect Leda 04/04/2024 NEG   Final    Specimen Outdate 04/04/2024 04/07/2024 23:59   Final    QRS Duration 04/04/2024 102  ms Final    [facility] QTC Calculation 04/04/2024 435  ms Final    Prothrombin Time 04/04/2024 13.4 (H)  9.0 - 12.5 sec Final    INR  04/04/2024 1.2  0.8 - 1.2 Final    Comment: Coumadin Therapy:  2.0 - 3.0 for INR for all indicators except mechanical heart valves  and antiphospholipid syndromes which should use 2.5 - 3.5.  LOT^040^PT Inn^[id number]      Sodium 04/04/2024 141  136 - 145 mmol/L Final    Potassium 04/04/2024 4.6  3.5 - 5.1 mmol/L Final    Chloride 04/04/2024 106  95 - 110 mmol/L Final    CO2 04/04/2024 24  23 - 29 mmol/L Final    Glucose 04/04/2024 109  70 - 110 mg/dL Final    BUN 04/04/2024 30 (H)  6 - 20 mg/dL Final    Creatinine 04/04/2024 1.5 (H)  0.5 - 1.4 mg/dL Final    Calcium 04/04/2024 9.2  8.7 - 10.5 mg/dL Final    Total Protein 04/04/2024 6.4  6.0 - 8.4 g/dL Final    Albumin 04/04/2024 3.5  3.5 - 5.2 g/dL Final    Total Bilirubin 04/04/2024 0.7  0.1 - 1.0 mg/dL Final    Comment: For infants and newborns, interpretation of results should be based  on gestational age, weight and in agreement with clinical  observations.    Premature Infant recommended reference ranges:  Up to 24 hours.............<8.0 mg/dL  Up to 48 hours............<12.0 mg/dL  3-5 days..................<15.0 mg/dL  6-29 days.................<15.0 mg/dL      Alkaline Phosphatase 04/04/2024 59  55 - 135 U/L Final    AST 04/04/2024 16  10 - 40 U/L Final    ALT 04/04/2024 17  10 - 44 U/L Final    eGFR 04/04/2024 54 (A)  >60 mL/min/1.73 m^2 Final    Anion Gap 04/04/2024 11  8 - 16 mmol/L Final    Hemoglobin 04/04/2024 9.9 (L)  14.0 - 18.0 g/dL Final    Hematocrit 04/04/2024 30.5 (L)  40.0 - 54.0 % Final    Hemoglobin 04/05/2024 10.3 (L)  14.0 - 18.0 g/dL Final    Hematocrit 04/05/2024 31.1 (L)  40.0 - 54.0 % Final    POCT Glucose 04/04/2024 121 (H)  70 - 110 mg/dL Final    WBC 04/05/2024 10.46  3.90 - 12.70 K/uL Final    RBC 04/05/2024 3.19 (L)  4.60 - 6.20 M/uL Final    Hemoglobin 04/05/2024 8.6 (L)  14.0 - 18.0 g/dL Final    Hematocrit 04/05/2024 26.6 (L)  40.0 - 54.0 % Final    MCV 04/05/2024 83  82 - 98 fL Final    MCH 04/05/2024 27.0  27.0 - 31.0 pg Final     MCHC 04/05/2024 32.3  32.0 - 36.0 g/dL Final    RDW 04/05/2024 13.8  11.5 - 14.5 % Final    Platelets 04/05/2024 181  150 - 450 K/uL Final    MPV 04/05/2024 10.3  9.2 - 12.9 fL Final    Immature Granulocytes 04/05/2024 0.4  0.0 - 0.5 % Final    Gran # (ANC) 04/05/2024 6.9  1.8 - 7.7 K/uL Final    Immature Grans (Abs) 04/05/2024 0.04  0.00 - 0.04 K/uL Final    Comment: Mild elevation in immature granulocytes is non specific and   can be seen in a variety of conditions including stress response,   acute inflammation, trauma and pregnancy. Correlation with other   laboratory and clinical findings is essential.      Lymph # 04/05/2024 2.5  1.0 - 4.8 K/uL Final    Mono # 04/05/2024 0.7  0.3 - 1.0 K/uL Final    Eos # 04/05/2024 0.2  0.0 - 0.5 K/uL Final    Baso # 04/05/2024 0.02  0.00 - 0.20 K/uL Final    nRBC 04/05/2024 0  0 /100 WBC Final    Gran % 04/05/2024 66.2  38.0 - 73.0 % Final    Lymph % 04/05/2024 24.1  18.0 - 48.0 % Final    Mono % 04/05/2024 7.1  4.0 - 15.0 % Final    Eosinophil % 04/05/2024 2.0  0.0 - 8.0 % Final    Basophil % 04/05/2024 0.2  0.0 - 1.9 % Final    Differential Method 04/05/2024 Automated   Final    Sodium 04/05/2024 141  136 - 145 mmol/L Final    Potassium 04/05/2024 4.4  3.5 - 5.1 mmol/L Final    Chloride 04/05/2024 108  95 - 110 mmol/L Final    CO2 04/05/2024 26  23 - 29 mmol/L Final    Glucose 04/05/2024 119 (H)  70 - 110 mg/dL Final    BUN 04/05/2024 37 (H)  6 - 20 mg/dL Final    Creatinine 04/05/2024 1.8 (H)  0.5 - 1.4 mg/dL Final    Calcium 04/05/2024 8.8  8.7 - 10.5 mg/dL Final    Total Protein 04/05/2024 5.9 (L)  6.0 - 8.4 g/dL Final    Albumin 04/05/2024 3.3 (L)  3.5 - 5.2 g/dL Final    Total Bilirubin 04/05/2024 0.7  0.1 - 1.0 mg/dL Final    Comment: For infants and newborns, interpretation of results should be based  on gestational age, weight and in agreement with clinical  observations.    Premature Infant recommended reference ranges:  Up to 24 hours.............<8.0 mg/dL  Up  to 48 hours............<12.0 mg/dL  3-5 days..................<15.0 mg/dL  6-29 days.................<15.0 mg/dL      Alkaline Phosphatase 04/05/2024 54 (L)  55 - 135 U/L Final    AST 04/05/2024 13  10 - 40 U/L Final    ALT 04/05/2024 16  10 - 44 U/L Final    eGFR 04/05/2024 44 (A)  >60 mL/min/1.73 m^2 Final    Anion Gap 04/05/2024 7 (L)  8 - 16 mmol/L Final    Magnesium 04/05/2024 1.4 (L)  1.6 - 2.6 mg/dL Final    POCT Glucose 04/05/2024 109  70 - 110 mg/dL Final    POCT Glucose 04/05/2024 124 (H)  70 - 110 mg/dL Final    POCT Glucose 04/05/2024 156 (H)  70 - 110 mg/dL Final    POCT Glucose 04/05/2024 133 (H)  70 - 110 mg/dL Final    WBC 04/06/2024 7.64  3.90 - 12.70 K/uL Final    RBC 04/06/2024 2.80 (L)  4.60 - 6.20 M/uL Final    Hemoglobin 04/06/2024 7.6 (L)  14.0 - 18.0 g/dL Final    Hematocrit 04/06/2024 23.5 (L)  40.0 - 54.0 % Final    MCV 04/06/2024 84  82 - 98 fL Final    MCH 04/06/2024 27.1  27.0 - 31.0 pg Final    MCHC 04/06/2024 32.3  32.0 - 36.0 g/dL Final    RDW 04/06/2024 13.8  11.5 - 14.5 % Final    Platelets 04/06/2024 169  150 - 450 K/uL Final    MPV 04/06/2024 10.2  9.2 - 12.9 fL Final    Immature Granulocytes 04/06/2024 0.3  0.0 - 0.5 % Final    Gran # (ANC) 04/06/2024 4.6  1.8 - 7.7 K/uL Final    Immature Grans (Abs) 04/06/2024 0.02  0.00 - 0.04 K/uL Final    Comment: Mild elevation in immature granulocytes is non specific and   can be seen in a variety of conditions including stress response,   acute inflammation, trauma and pregnancy. Correlation with other   laboratory and clinical findings is essential.      Lymph # 04/06/2024 2.0  1.0 - 4.8 K/uL Final    Mono # 04/06/2024 0.6  0.3 - 1.0 K/uL Final    Eos # 04/06/2024 0.4  0.0 - 0.5 K/uL Final    Baso # 04/06/2024 0.02  0.00 - 0.20 K/uL Final    nRBC 04/06/2024 0  0 /100 WBC Final    Gran % 04/06/2024 60.7  38.0 - 73.0 % Final    Lymph % 04/06/2024 26.4  18.0 - 48.0 % Final    Mono % 04/06/2024 7.7  4.0 - 15.0 % Final    Eosinophil %  04/06/2024 4.6  0.0 - 8.0 % Final    Basophil % 04/06/2024 0.3  0.0 - 1.9 % Final    Differential Method 04/06/2024 Automated   Final    Glucose 04/06/2024 101  70 - 110 mg/dL Final    Sodium 04/06/2024 138  136 - 145 mmol/L Final    Potassium 04/06/2024 3.7  3.5 - 5.1 mmol/L Final    Chloride 04/06/2024 107  95 - 110 mmol/L Final    CO2 04/06/2024 24  23 - 29 mmol/L Final    BUN 04/06/2024 26 (H)  6 - 20 mg/dL Final    Calcium 04/06/2024 8.4 (L)  8.7 - 10.5 mg/dL Final    Creatinine 04/06/2024 1.3  0.5 - 1.4 mg/dL Final    Albumin 04/06/2024 3.2 (L)  3.5 - 5.2 g/dL Final    Phosphorus 04/06/2024 3.0  2.7 - 4.5 mg/dL Final    eGFR 04/06/2024 >60  >60 mL/min/1.73 m^2 Final    Anion Gap 04/06/2024 7 (L)  8 - 16 mmol/L Final    POCT Glucose 04/06/2024 97  70 - 110 mg/dL Final    POCT Glucose 04/06/2024 160 (H)  70 - 110 mg/dL Final    POCT Glucose 04/06/2024 107  70 - 110 mg/dL Final    POCT Glucose 04/06/2024 124 (H)  70 - 110 mg/dL Final    WBC 04/07/2024 6.00  3.90 - 12.70 K/uL Final    RBC 04/07/2024 2.66 (L)  4.60 - 6.20 M/uL Final    Hemoglobin 04/07/2024 7.2 (L)  14.0 - 18.0 g/dL Final    Hematocrit 04/07/2024 22.2 (L)  40.0 - 54.0 % Final    MCV 04/07/2024 84  82 - 98 fL Final    MCH 04/07/2024 27.1  27.0 - 31.0 pg Final    MCHC 04/07/2024 32.4  32.0 - 36.0 g/dL Final    RDW 04/07/2024 14.0  11.5 - 14.5 % Final    Platelets 04/07/2024 161  150 - 450 K/uL Final    MPV 04/07/2024 10.2  9.2 - 12.9 fL Final    Immature Granulocytes 04/07/2024 0.2  0.0 - 0.5 % Final    Gran # (ANC) 04/07/2024 3.0  1.8 - 7.7 K/uL Final    Immature Grans (Abs) 04/07/2024 0.01  0.00 - 0.04 K/uL Final    Comment: Mild elevation in immature granulocytes is non specific and   can be seen in a variety of conditions including stress response,   acute inflammation, trauma and pregnancy. Correlation with other   laboratory and clinical findings is essential.      Lymph # 04/07/2024 2.2  1.0 - 4.8 K/uL Final    Mono # 04/07/2024 0.5  0.3 - 1.0  K/uL Final    Eos # 04/07/2024 0.3  0.0 - 0.5 K/uL Final    Baso # 04/07/2024 0.01  0.00 - 0.20 K/uL Final    nRBC 04/07/2024 0  0 /100 WBC Final    Gran % 04/07/2024 50.6  38.0 - 73.0 % Final    Lymph % 04/07/2024 36.2  18.0 - 48.0 % Final    Mono % 04/07/2024 8.0  4.0 - 15.0 % Final    Eosinophil % 04/07/2024 4.8  0.0 - 8.0 % Final    Basophil % 04/07/2024 0.2  0.0 - 1.9 % Final    Differential Method 04/07/2024 Automated   Final    Glucose 04/07/2024 112 (H)  70 - 110 mg/dL Final    Sodium 04/07/2024 139  136 - 145 mmol/L Final    Potassium 04/07/2024 3.3 (L)  3.5 - 5.1 mmol/L Final    Chloride 04/07/2024 108  95 - 110 mmol/L Final    CO2 04/07/2024 23  23 - 29 mmol/L Final    BUN 04/07/2024 17  6 - 20 mg/dL Final    Calcium 04/07/2024 8.1 (L)  8.7 - 10.5 mg/dL Final    Creatinine 04/07/2024 1.2  0.5 - 1.4 mg/dL Final    Albumin 04/07/2024 3.1 (L)  3.5 - 5.2 g/dL Final    Phosphorus 04/07/2024 2.9  2.7 - 4.5 mg/dL Final    eGFR 04/07/2024 >60  >60 mL/min/1.73 m^2 Final    Anion Gap 04/07/2024 8  8 - 16 mmol/L Final    POCT Glucose 04/07/2024 125 (H)  70 - 110 mg/dL Final    POCT Glucose 04/07/2024 141 (H)  70 - 110 mg/dL Final   Patient Outreach on 01/08/2024   Component Date Value Ref Range Status    Glucose 01/12/2024 129 (H)  65 - 99 mg/dL Final    Comment:               Fasting reference interval     For someone without known diabetes, a glucose  value >125 mg/dL indicates that they may have  diabetes and this should be confirmed with a  follow-up test.         BUN 01/12/2024 14  7 - 25 mg/dL Final    Creatinine 01/12/2024 1.24  0.70 - 1.30 mg/dL Final    eGFR 01/12/2024 68  > OR = 60 mL/min/1.73m2 Final    BUN/Creatinine Ratio 01/12/2024 SEE NOTE:  6 - 22 (calc) Final    Comment:    Not Reported: BUN and Creatinine are within     reference range.            Sodium 01/12/2024 141  135 - 146 mmol/L Final    Potassium 01/12/2024 4.2  3.5 - 5.3 mmol/L Final    Chloride 01/12/2024 103  98 - 110 mmol/L Final     CO2 01/12/2024 29  20 - 32 mmol/L Final    Calcium 01/12/2024 9.3  8.6 - 10.3 mg/dL Final    Total Protein 01/12/2024 7.2  6.1 - 8.1 g/dL Final    Albumin 01/12/2024 4.3  3.6 - 5.1 g/dL Final    Globulin, Total 01/12/2024 2.9  1.9 - 3.7 g/dL (calc) Final    Albumin/Globulin Ratio 01/12/2024 1.5  1.0 - 2.5 (calc) Final    Total Bilirubin 01/12/2024 0.7  0.2 - 1.2 mg/dL Final    Alkaline Phosphatase 01/12/2024 86  35 - 144 U/L Final    AST 01/12/2024 16  10 - 35 U/L Final    ALT 01/12/2024 19  9 - 46 U/L Final    Cholesterol 01/12/2024 111  <200 mg/dL Final    HDL 01/12/2024 33 (L)  > OR = 40 mg/dL Final    Triglycerides 01/12/2024 128  <150 mg/dL Final    LDL Cholesterol 01/12/2024 57  mg/dL (calc) Final    Comment: Reference range: <100     Desirable range <100 mg/dL for primary prevention;    <70 mg/dL for patients with CHD or diabetic patients   with > or = 2 CHD risk factors.     LDL-C is now calculated using the Chris-Osman   calculation, which is a validated novel method providing   better accuracy than the Friedewald equation in the   estimation of LDL-C.   Chris CLAROS et al. FAISAL. 2013;310(19): 9826-7258   (http://education.[employer].com/faq/TID321)      HDL/Cholesterol Ratio 01/12/2024 3.4  <5.0 (calc) Final    Non HDL Chol. (LDL+VLDL) 01/12/2024 78  <130 mg/dL (calc) Final    Comment: For patients with diabetes plus 1 major ASCVD risk   factor, treating to a non-HDL-C goal of <100 mg/dL   (LDL-C of <70 mg/dL) is considered a therapeutic   option.      Hemoglobin A1C 01/12/2024 6.4 (H)  <5.7 % of total Hgb Final    Comment: For someone without known diabetes, a hemoglobin   A1c value between 5.7% and 6.4% is consistent with  prediabetes and should be confirmed with a   follow-up test.     For someone with known diabetes, a value <7%  indicates that their diabetes is well controlled. A1c  targets should be individualized based on duration of  diabetes, age, comorbid conditions, and  other  considerations.     This assay result is consistent with an increased risk  of diabetes.     Currently, no consensus exists regarding use of  hemoglobin A1c for diagnosis of diabetes for children.     HbA1c performed on Roche platform.  Effective 11/13/23 a change in test platforms may have   shifted HbA1c results compared to historical results.      Creatinine, Urine 01/15/2024 119  20 - 320 mg/dL Final    Microalb, Ur 01/15/2024 1.2  See Note: mg/dL Final    Comment: Reference Range:    Reference Range  Not established      Microalb/Creat Ratio 01/15/2024 10  <30 mcg/mg creat Final    Comment:    The ADA defines abnormalities in albumin  excretion as follows:     Albuminuria Category        Result (mcg/mg creatinine)     Normal to Mildly increased   <30  Moderately increased            Severely increased           > OR = 300     The ADA recommends that at least two of three  specimens collected within a 3-6 month period be  abnormal before considering a patient to be  within a diagnostic category.         Imaging  No results found.    Assessment  1. Essential hypertension  Blood pressure is elevated today but reports generally good control  - Ambulatory referral/consult to Cardiology    2. Bilateral pulmonary embolism  3 years ago  - Ambulatory referral/consult to Cardiology    3. Acute ischemic stroke  3 years ago  - Ambulatory referral/consult to Cardiology      Plan and Discussion    Until he undergoes colonoscopy, it is difficult to decide what his is risk of a recurrent bleed.  Since his pulmonary embolism was a single event and was provoked, unclear that he needs to be on lifelong anticoagulation.  Can only reassess the appropriateness of anticoagulation after he undergoes his GI workup.    The ASCVD Risk score (Poornima MONK, et al., 2019) failed to calculate for the following reasons:    The patient has a prior MI or stroke diagnosis     Follow Up  Follow up in about 3 months (around  7/30/2024).      Rupert Frank MD

## 2024-05-13 ENCOUNTER — OFFICE VISIT (OUTPATIENT)
Dept: PODIATRY | Facility: CLINIC | Age: 59
End: 2024-05-13
Payer: MEDICARE

## 2024-05-13 VITALS — DIASTOLIC BLOOD PRESSURE: 92 MMHG | SYSTOLIC BLOOD PRESSURE: 141 MMHG | HEART RATE: 80 BPM

## 2024-05-13 DIAGNOSIS — I82.412 ACUTE DEEP VEIN THROMBOSIS (DVT) OF FEMORAL VEIN OF LEFT LOWER EXTREMITY: ICD-10-CM

## 2024-05-13 DIAGNOSIS — Z86.711 HISTORY OF PULMONARY EMBOLISM: ICD-10-CM

## 2024-05-13 DIAGNOSIS — M79.676 PAIN AROUND TOENAIL: ICD-10-CM

## 2024-05-13 DIAGNOSIS — E11.22 TYPE 2 DIABETES MELLITUS WITH STAGE 3 CHRONIC KIDNEY DISEASE, WITHOUT LONG-TERM CURRENT USE OF INSULIN, UNSPECIFIED WHETHER STAGE 3A OR 3B CKD: ICD-10-CM

## 2024-05-13 DIAGNOSIS — Q84.5 ENLARGED AND HYPERTROPHIC NAILS: ICD-10-CM

## 2024-05-13 DIAGNOSIS — E11.9 TYPE 2 DIABETES MELLITUS WITHOUT COMPLICATION, WITHOUT LONG-TERM CURRENT USE OF INSULIN: ICD-10-CM

## 2024-05-13 DIAGNOSIS — I69.354 HEMIPARESIS AFFECTING LEFT SIDE AS LATE EFFECT OF CEREBROVASCULAR ACCIDENT: ICD-10-CM

## 2024-05-13 DIAGNOSIS — R60.9 DEPENDENT EDEMA: ICD-10-CM

## 2024-05-13 DIAGNOSIS — B35.1 SUPERFICIAL WHITE ONYCHOMYCOSIS: ICD-10-CM

## 2024-05-13 DIAGNOSIS — R53.1 WEAKNESS: ICD-10-CM

## 2024-05-13 DIAGNOSIS — N18.30 TYPE 2 DIABETES MELLITUS WITH STAGE 3 CHRONIC KIDNEY DISEASE, WITHOUT LONG-TERM CURRENT USE OF INSULIN, UNSPECIFIED WHETHER STAGE 3A OR 3B CKD: ICD-10-CM

## 2024-05-13 DIAGNOSIS — I69.30 HISTORY OF CVA WITH RESIDUAL DEFICIT: Primary | ICD-10-CM

## 2024-05-13 PROCEDURE — 99213 OFFICE O/P EST LOW 20 MIN: CPT | Mod: 25,S$GLB,, | Performed by: PODIATRIST

## 2024-05-13 PROCEDURE — 3061F NEG MICROALBUMINURIA REV: CPT | Mod: CPTII,S$GLB,, | Performed by: PODIATRIST

## 2024-05-13 PROCEDURE — 3077F SYST BP >= 140 MM HG: CPT | Mod: CPTII,S$GLB,, | Performed by: PODIATRIST

## 2024-05-13 PROCEDURE — 4010F ACE/ARB THERAPY RXD/TAKEN: CPT | Mod: CPTII,S$GLB,, | Performed by: PODIATRIST

## 2024-05-13 PROCEDURE — 99999 PR PBB SHADOW E&M-EST. PATIENT-LVL III: CPT | Mod: PBBFAC,,, | Performed by: PODIATRIST

## 2024-05-13 PROCEDURE — 11721 DEBRIDE NAIL 6 OR MORE: CPT | Mod: S$GLB,,, | Performed by: PODIATRIST

## 2024-05-13 PROCEDURE — 3080F DIAST BP >= 90 MM HG: CPT | Mod: CPTII,S$GLB,, | Performed by: PODIATRIST

## 2024-05-13 PROCEDURE — 1159F MED LIST DOCD IN RCRD: CPT | Mod: CPTII,S$GLB,, | Performed by: PODIATRIST

## 2024-05-13 PROCEDURE — 3044F HG A1C LEVEL LT 7.0%: CPT | Mod: CPTII,S$GLB,, | Performed by: PODIATRIST

## 2024-05-13 PROCEDURE — 3066F NEPHROPATHY DOC TX: CPT | Mod: CPTII,S$GLB,, | Performed by: PODIATRIST

## 2024-05-13 NOTE — PROGRESS NOTES
Subjective:      Patient ID: Meliton Tyson is a 58 y.o. male.    Chief Complaint: Diabetic Foot Exam    Patient presents, accompanied by his wife, after an absence of >1-1/2 yrs.to re-establish care. No current acute LE concerns. Nails long & hard to trim so cause discomfort w/ pressure. In wheelchair D/T CVA x 2 w/ weakness as well as h/o PE.  9/8/22  Patient comes in today accompanied by his wife who states that he has had blood clot heel a while ago - wants to make sure it is not ulcerating. Happened about 4-5 months ago. Hurts only when hit side of bed. Otherwise no pain.   Patient 's wife has difficulty with his toenails. Concerned about it.     He was in skilled nursing after CVA in July of 2021.  Affected his left arm and leg with residual weakness. Had another prior to that that affected his right side in June. Has had 2 PE.  States that he has uncontrolled frequent laughter since.   Ambulates usually with the assistance of a walker but comes in a wheelchair today    PCP: Rodrick Angulo MD 1/22/24    D/C [facility] d/t GI bleed hospitalized.  Past Medical History:   Diagnosis Date    CKD (chronic kidney disease)     Diabetes mellitus     Hypertension     Stroke       Patient Active Problem List   Diagnosis    Type 2 diabetes mellitus, without long-term current use of insulin    Acute ischemic stroke    Dyslipidemia    Weakness    Class 2 obesity in adult    Essential hypertension    Stage 3a chronic kidney disease    Stroke    Slow transit constipation    Acute deep vein thrombosis (DVT) of femoral vein of left lower extremity    Acute pulmonary embolism    Cerebrovascular accident (CVA) due to embolism    Bilateral pulmonary embolism    Urinary retention    Suspected sleep apnea    Dysarthria    Right pontine stroke    Abnormal echocardiogram    Lower GI bleed    History of CVA with residual deficit    On continuous oral anticoagulation      Hemoglobin A1C   Date Value Ref Range Status    01/12/2024 6.4 (H) <5.7 % of total Hgb Final     Comment:     For someone without known diabetes, a hemoglobin   A1c value between 5.7% and 6.4% is consistent with  prediabetes and should be confirmed with a   follow-up test.     For someone with known diabetes, a value <7%  indicates that their diabetes is well controlled. A1c  targets should be individualized based on duration of  diabetes, age, comorbid conditions, and other  considerations.     This assay result is consistent with an increased risk  of diabetes.     Currently, no consensus exists regarding use of  hemoglobin A1c for diagnosis of diabetes for children.     HbA1c performed on Roche platform.  Effective 11/13/23 a change in test platforms may have   shifted HbA1c results compared to historical results.     11/25/2022 6.7 (H) 4.0 - 5.6 % Final     Comment:     ADA Screening Guidelines:  5.7-6.4%  Consistent with prediabetes  >or=6.5%  Consistent with diabetes    High levels of fetal hemoglobin interfere with the HbA1C  assay. Heterozygous hemoglobin variants (HbS, HgC, etc)do  not significantly interfere with this assay.   However, presence of multiple variants may affect accuracy.     07/19/2022 9.1 (H) 4.0 - 5.6 % Final     Comment:     ADA Screening Guidelines:  5.7-6.4%  Consistent with prediabetes  >or=6.5%  Consistent with diabetes    High levels of fetal hemoglobin interfere with the HbA1C  assay. Heterozygous hemoglobin variants (HbS, HgC, etc)do  not significantly interfere with this assay.   However, presence of multiple variants may affect accuracy.         Objective:      Review of Systems   Constitutional: Positive for malaise/fatigue.   Cardiovascular:  Positive for leg swelling. Negative for claudication.   Skin:  Positive for color change, dry skin and suspicious lesions. Negative for itching, nail changes and rash.   Musculoskeletal:  Positive for muscle weakness. Negative for arthritis, joint pain and myalgias.   Neurological:   Positive for focal weakness, loss of balance, paresthesias and sensory change. Negative for numbness and weakness.   Psychiatric/Behavioral:  The patient is not nervous/anxious.      Physical Exam  Vitals reviewed.   Constitutional:       Appearance: He is well-developed.   Cardiovascular:      Pulses:           Dorsalis pedis pulses are 2+ on the right side and 2+ on the left side.   Musculoskeletal:         General: No swelling or tenderness.      Right lower leg: Edema present.      Left lower leg: Edema present.      Right foot: Normal range of motion. No bunion or foot drop.      Left foot: Normal range of motion. Bunion present. No foot drop.        Feet:       Comments: Passive ROM of ankle and pedal joints is painless and without crepitation B/L.      Feet:      Right foot:      Skin integrity: Callus, dry skin and fissure present.      Toenail Condition: Right toenails are long and ingrown.      Left foot:      Skin integrity: Dry skin present. No callus or fissure.      Toenail Condition: Left toenails are long and ingrown.      Comments: Toenails 1st, 2nd, 3rd, 4th, 5th  B/L are hypertrophic, wide, w/ hyperkeratosis @ nail borders w/ tenderness to nail plate pressure distally d/t impingement. Superficial onychomycosis 1st, 3rd & 4th L, 2nd R & 5th B/L.     Skin:     General: Skin is warm and dry.      Capillary Refill: Capillary refill takes 2 to 3 seconds.      Findings: Lesion (hyperkeratosis minimal posterior lateral R heel) present. No bruising, erythema or rash.   Neurological:      Mental Status: He is alert and oriented to person, place, and time.      Sensory: Sensory deficit present.      Motor: Weakness (residual hemiparesis s/p CVA x2) present. No abnormal muscle tone.      Coordination: Abnormal coordination: Walker/wheelchair.      Gait: Gait abnormal (walker or wheelchair).   Psychiatric:         Mood and Affect: Mood and affect normal. Mood is not anxious.         Behavior: Behavior is  slowed. Behavior is cooperative.         Assessment:      Encounter Diagnoses   Name Primary?    History of CVA with residual deficit Yes    Acute deep vein thrombosis (DVT) of femoral vein of left lower extremity     Type 2 diabetes mellitus with stage 3 chronic kidney disease, without long-term current use of insulin, unspecified whether stage 3a or 3b CKD     History of pulmonary embolism     Enlarged and hypertrophic nails     Weakness     Superficial white onychomycosis     Dependent edema     Pain around toenail     Hemiparesis affecting left side as late effect of cerebrovascular accident        Problem List Items Addressed This Visit          Neuro    History of CVA with residual deficit - Primary       Hematology    Acute deep vein thrombosis (DVT) of femoral vein of left lower extremity       Endocrine    Type 2 diabetes mellitus, without long-term current use of insulin    Relevant Orders    Nail debridement       Other    Weakness     Other Visit Diagnoses       History of pulmonary embolism        Enlarged and hypertrophic nails        Relevant Orders    Nail debridement    Superficial white onychomycosis        Relevant Orders    Nail debridement    Dependent edema        Pain around toenail        Relevant Orders    Nail debridement    Hemiparesis affecting left side as late effect of cerebrovascular accident               Plan:       Meliton was seen today for diabetic foot exam.    Diagnoses and all orders for this visit:    History of CVA with residual deficit    Acute deep vein thrombosis (DVT) of femoral vein of left lower extremity    Type 2 diabetes mellitus with stage 3 chronic kidney disease, without long-term current use of insulin, unspecified whether stage 3a or 3b CKD  -     Nail debridement    History of pulmonary embolism    Enlarged and hypertrophic nails  -     Nail debridement    Weakness    Superficial white onychomycosis  -     Nail debridement    Dependent edema    Pain around  toenail  -     Nail debridement    Hemiparesis affecting left side as late effect of cerebrovascular accident    I counseled the patient on his conditions, their implications & medical mgmt.    - Shoe inspection. Diabetic Foot Education. Patient reminded of the importance of good blood sugar control to help prevent podiatric complications of diabetes. Patient instructed on proper foot hygeine. We discussed wearing proper shoe gear, daily foot inspections, never walking w/out protective shoe gear, annual diabetic foot exam, sooner p.r.n.    - W/ patient's permission, B/L nails were aggressively reduced and debrided x 10 to their soft tissue attachment mechanically, removing all offending nail & debris. Patient relates relief following the procedure.   He will continue to monitor the areas daily, inspect his feet, wear protective shoe gear when ambulatory, moisturizer to maintain skin integrity.        A total of 31 mins.was spent on chart review, patient visit & documentation.

## 2024-05-20 NOTE — PROCEDURES
Nail debridement    Date/Time: 5/13/2024 3:30 PM    Performed by: Flory Prasad DPM  Authorized by: Flory Prasad DPM    Consent Done?:  Yes (Verbal)    Nail Care Type:  Debride  Location(s): All  (Left 1st Toe, Left 3rd Toe, Left 2nd Toe, Left 4th Toe, Left 5th Toe, Right 1st Toe, Right 2nd Toe, Right 3rd Toe, Right 4th Toe and Right 5th Toe)  Patient tolerance:  Patient tolerated the procedure well with no immediate complications

## 2024-05-21 NOTE — OR NURSING
Instructed patient to report to [facility] for 530 am on 5/29. Reviewed current medications and allergies to best of patient knowledge. No questions regarding prep for Colonoscopy. Ross Confirmed.

## 2024-05-27 ENCOUNTER — ANESTHESIA EVENT (OUTPATIENT)
Dept: ENDOSCOPY | Facility: OTHER | Age: 59
End: 2024-05-27
Payer: MEDICARE

## 2024-05-29 ENCOUNTER — HOSPITAL ENCOUNTER (OUTPATIENT)
Facility: OTHER | Age: 59
Discharge: HOME | End: 2024-05-29
Attending: INTERNAL MEDICINE | Admitting: INTERNAL MEDICINE
Payer: MEDICARE

## 2024-05-29 ENCOUNTER — ANESTHESIA (OUTPATIENT)
Dept: ENDOSCOPY | Facility: OTHER | Age: 59
End: 2024-05-29
Payer: MEDICARE

## 2024-05-29 VITALS
HEART RATE: 68 BPM | WEIGHT: 226.44 LBS | HEIGHT: 68 IN | RESPIRATION RATE: 18 BRPM | TEMPERATURE: 98 F | DIASTOLIC BLOOD PRESSURE: 97 MMHG | OXYGEN SATURATION: 97 % | SYSTOLIC BLOOD PRESSURE: 157 MMHG | BODY MASS INDEX: 34.32 KG/M2

## 2024-05-29 DIAGNOSIS — K92.2 LOWER GI BLEED: Primary | ICD-10-CM

## 2024-05-29 DIAGNOSIS — Z12.11 ENCOUNTER FOR SCREENING COLONOSCOPY: ICD-10-CM

## 2024-05-29 LAB — POCT GLUCOSE: 75 MG/DL (ref 70–110)

## 2024-05-29 PROCEDURE — 63600175 PHARM REV CODE 636 W HCPCS: Performed by: ANESTHESIOLOGY

## 2024-05-29 PROCEDURE — D9220A PRA ANESTHESIA: Mod: ANES,,, | Performed by: ANESTHESIOLOGY

## 2024-05-29 PROCEDURE — 45378 DIAGNOSTIC COLONOSCOPY: CPT | Performed by: INTERNAL MEDICINE

## 2024-05-29 PROCEDURE — 37000008 HC ANESTHESIA 1ST 15 MINUTES: Performed by: INTERNAL MEDICINE

## 2024-05-29 PROCEDURE — D9220A PRA ANESTHESIA: Mod: CRNA,,, | Performed by: NURSE ANESTHETIST, CERTIFIED REGISTERED

## 2024-05-29 PROCEDURE — 63600175 PHARM REV CODE 636 W HCPCS: Performed by: NURSE ANESTHETIST, CERTIFIED REGISTERED

## 2024-05-29 PROCEDURE — 25000003 PHARM REV CODE 250: Performed by: NURSE ANESTHETIST, CERTIFIED REGISTERED

## 2024-05-29 PROCEDURE — 37000009 HC ANESTHESIA EA ADD 15 MINS: Performed by: INTERNAL MEDICINE

## 2024-05-29 RX ORDER — SODIUM CHLORIDE 0.9 % (FLUSH) 0.9 %
10 SYRINGE (ML) INJECTION
Status: DISCONTINUED | OUTPATIENT
Start: 2024-05-29 | End: 2024-05-29 | Stop reason: HOSPADM

## 2024-05-29 RX ORDER — PROPOFOL 10 MG/ML
VIAL (ML) INTRAVENOUS
Status: DISCONTINUED | OUTPATIENT
Start: 2024-05-29 | End: 2024-05-29

## 2024-05-29 RX ORDER — LIDOCAINE HYDROCHLORIDE 20 MG/ML
INJECTION INTRAVENOUS
Status: DISCONTINUED | OUTPATIENT
Start: 2024-05-29 | End: 2024-05-29

## 2024-05-29 RX ORDER — PROPOFOL 10 MG/ML
VIAL (ML) INTRAVENOUS CONTINUOUS PRN
Status: DISCONTINUED | OUTPATIENT
Start: 2024-05-29 | End: 2024-05-29

## 2024-05-29 RX ADMIN — SODIUM CHLORIDE, SODIUM LACTATE, POTASSIUM CHLORIDE, AND CALCIUM CHLORIDE: .6; .31; .03; .02 INJECTION, SOLUTION INTRAVENOUS at 07:05

## 2024-05-29 RX ADMIN — PROPOFOL 125 MCG/KG/MIN: 10 INJECTION, EMULSION INTRAVENOUS at 07:05

## 2024-05-29 RX ADMIN — LIDOCAINE HYDROCHLORIDE 50 MG: 20 INJECTION, SOLUTION INTRAVENOUS at 07:05

## 2024-05-29 RX ADMIN — PROPOFOL 50 MG: 10 INJECTION, EMULSION INTRAVENOUS at 07:05

## 2024-05-29 RX ADMIN — PROPOFOL 30 MG: 10 INJECTION, EMULSION INTRAVENOUS at 07:05

## 2024-05-29 NOTE — PLAN OF CARE
Meliton Tyson has met all discharge criteria from Phase II. Vital Signs are stable. Discharge instructions given, patient verbalized understanding. Discharged from facility via wheelchair in stable condition.

## 2024-05-29 NOTE — ANESTHESIA POSTPROCEDURE EVALUATION
Anesthesia Post Evaluation    Patient: Meliton Tyson    Procedure(s) Performed: Procedure(s) (LRB):  COLONOSCOPY (N/A)    Final Anesthesia Type: MAC      Patient location during evaluation: [facility]  Patient participation: Yes- Able to Participate  Level of consciousness: awake and alert, awake and oriented  Post-procedure vital signs: reviewed and stable  Pain management: adequate  Airway patency: patent    PONV status at discharge: No PONV  Anesthetic complications: no      Cardiovascular status: blood pressure returned to baseline, hemodynamically stable and stable  Respiratory status: spontaneous ventilation, unassisted and room air  Hydration status: euvolemic  Follow-up not needed.              Vitals Value Taken Time   /87 05/29/24 0657   Temp 36.6 °C (97.8 °F) 05/29/24 0657   Pulse 65 05/29/24 0657   Resp 18 05/29/24 0657   SpO2 98 % 05/29/24 0657         No case tracking events are documented in the log.      Pain/Jim Score: No data recorded

## 2024-05-29 NOTE — ANESTHESIA PREPROCEDURE EVALUATION
05/29/2024  Meliton Tyson is a 58 y.o., male.      Pre-op Assessment    I have reviewed the Patient Summary Reports.     I have reviewed the Nursing Notes. I have reviewed the NPO Status.   I have reviewed the Medications.     Review of Systems  Anesthesia Hx:  No problems with previous Anesthesia             Denies Family Hx of Anesthesia complications.    Denies Personal Hx of Anesthesia complications.                    Social:  Non-Smoker       Hematology/Oncology:    Oncology Normal    -- Anemia:               Hematology Comments: Lost hct  22 in early April. Has been on iron pills since                    EENT/Dental:  EENT/Dental Normal           Cardiovascular:  Exercise tolerance: poor   Hypertension               History PE                         Pulmonary:        Sleep Apnea                Renal/:  Chronic Renal Disease, CKD                Musculoskeletal:  Musculoskeletal Normal                Neurological:   CVA, residual symptoms        Lrft leg                            Endocrine:  Diabetes           Dermatological:  Skin Normal    Psych:  Psychiatric Normal                    Physical Exam  General: Well nourished, Cooperative, Oriented and Alert    Airway:  Mallampati: II / II  Mouth Opening: Normal  TM Distance: Normal  Neck ROM: Normal ROM    Dental:  Intact        Anesthesia Plan  Type of Anesthesia, risks & benefits discussed:    Anesthesia Type: MAC  Intra-op Monitoring Plan: Standard ASA Monitors  Post Op Pain Control Plan: multimodal analgesia  Induction:  IV  Airway Plan: Video and Direct  Informed Consent: Informed consent signed with the Patient and all parties understand the risks and agree with anesthesia plan.  All questions answered.   ASA Score: 3  Day of Surgery Review of History & Physical: H&P Update referred to the surgeon/provider.    Ready For Surgery From  Anesthesia Perspective.     .

## 2024-05-29 NOTE — [employer] PATIENT INSTRUCTIONS
Discharge Summary/Instructions after an Endoscopic Procedure  Patient Name: Meliton Tyson  Patient MRN: [id number]  Patient YOB: 1965  Wednesday, May 29, 2024  Richie Martinez MD  RESTRICTIONS:  During your procedure today, you received medications for sedation.  These   medications may affect your judgment, balance and coordination.  Therefore,   for 24 hours, you have the following restrictions:   - DO NOT drive a car, operate machinery, make legal/financial decisions,   sign important papers or drink alcohol.    ACTIVITY:  Today: no heavy lifting, straining or running due to procedural   sedation/anesthesia.  The following day: return to full activity including work.  DIET:  Eat and drink normally unless instructed otherwise.     TREATMENT FOR COMMON SIDE EFFECTS:  - Mild abdominal pain, nausea, belching, bloating or excessive gas:  rest,   eat lightly and use a heating pad.  - Sore Throat: treat with throat lozenges and/or gargle with warm salt   water.  - Because air was used during the procedure, expelling large amounts of air   from your rectum or belching is normal.  - If a bowel prep was taken, you may not have a bowel movement for 1-3 days.    This is normal.  SYMPTOMS TO WATCH FOR AND REPORT TO YOUR PHYSICIAN:  1. Abdominal pain or bloating, other than gas cramps.  2. Chest pain.  3. Back pain.  4. Signs of infection such as: chills or fever occurring within 24 hours   after the procedure.  5. Rectal bleeding, which would show as bright red, maroon, or black stools.   (A tablespoon of blood from the rectum is not serious, especially if   hemorrhoids are present.)  6. Vomiting.  7. Weakness or dizziness.  GO DIRECTLY TO THE NEAREST EMERGENCY ROOM IF YOU HAVE ANY OF THE FOLLOWING:      Difficulty breathing              Chills and/or fever over 101 F   Persistent vomiting and/or vomiting blood   Severe abdominal pain   Severe chest pain   Black, tarry stools   Bleeding- more than one  tablespoon   Any other symptom or condition that you feel may need urgent attention  Your doctor recommends these additional instructions:  If any biopsies were taken, your doctors clinic will contact you in 1 to 2   weeks with any results.  - Discharge patient to home (via wheelchair).   - Resume previous diet.   - Continue present medications.   - Repeat colonoscopy in 10 years for surveillance.   - Return to GI office in 3 months.  For questions, problems or results please call your physician - Richie Martinez MD at Work:  [phone].  [facility], EMERGENCY ROOM PHONE NUMBER: [phone], [facility]   [phone].  IF A COMPLICATION OR EMERGENCY SITUATION ARISES AND YOU ARE UNABLE TO REACH   YOUR PHYSICIAN - GO DIRECTLY TO THE EMERGENCY ROOM.  Richie Martinez MD  5/29/2024 7:39:56 AM  This report has been verified and signed electronically.  Dear patient,  As a result of recent federal legislation (The Federal Cures Act), you may   receive lab or pathology results from your procedure in your [facility]   account before your physician is able to contact you. Your physician or   their representative will relay the results to you with their   recommendations at their soonest availability.  Thank you,  [employer]

## 2024-05-29 NOTE — H&P
[facility]  Gastroenterology  H&P    Patient Name: Meliton Tyson  MRN: [id number]  Admission Date: 5/29/2024  Code Status: Full Code    Attending Provider:   Primary Care Physician: Rodrick Angulo MD  Principal Problem:<principal problem not specified>    Subjective:     History of Present Illness: This gent is here for an evaluation of hematochezia. He is set for a colon exam and the procedure was discussed in detail.    Past Medical History:   Diagnosis Date    CKD (chronic kidney disease)     Diabetes mellitus     Hypertension     Stroke        History reviewed. No pertinent surgical history.    Review of patient's allergies indicates:  No Known Allergies  Family History       Problem Relation (Age of Onset)    Asthma Mother    Diabetes Father    Hypertension Father    Stroke Sister          Tobacco Use    Smoking status: Never     Passive exposure: Never    Smokeless tobacco: Never   Substance and Sexual Activity    Alcohol use: No    Drug use: No    Sexual activity: Yes     Partners: Female     Birth control/protection: None     Review of Systems   Constitutional:  Positive for activity change and fatigue.   HENT: Negative.     Eyes: Negative.    Respiratory: Negative.     Cardiovascular: Negative.    Gastrointestinal:  Positive for blood in stool and rectal pain.   Endocrine: Negative for heat intolerance and polydipsia.   Genitourinary: Negative.    Musculoskeletal:  Positive for arthralgias, back pain, gait problem, joint swelling, myalgias, neck pain and neck stiffness.   Skin: Negative.    Allergic/Immunologic: Negative for food allergies and immunocompromised state.   Neurological:  Positive for dizziness and weakness.   Hematological: Negative.    Psychiatric/Behavioral: Negative.       Objective:     Vital Signs (Most Recent):  Temp: 97.8 °F (36.6 °C) (05/29/24 0657)  Pulse: 65 (05/29/24 0657)  Resp: 18 (05/29/24 0657)  BP: (!) 152/87 (05/29/24 0657)  SpO2: 98 % (05/29/24 0657) Vital  Signs (24h Range):  Temp:  [97.8 °F (36.6 °C)] 97.8 °F (36.6 °C)  Pulse:  [65] 65  Resp:  [18] 18  SpO2:  [98 %] 98 %  BP: (152)/(87) 152/87     Weight: 102.7 kg (226 lb 6.6 oz) (05/29/24 0657)  Body mass index is 34.43 kg/m².    No intake or output data in the 24 hours ending 05/29/24 0720    Lines/Drains/Airways       Peripheral Intravenous Line  Duration                  Peripheral IV - Single Lumen 05/29/24 0708 20 G Left Hand <1 day                    Physical Exam  Vitals and nursing note reviewed.   Constitutional:       General: He is not in acute distress.     Appearance: He is obese. He is ill-appearing.   HENT:      Head: Normocephalic and atraumatic.      Nose: Nose normal. No congestion.      Mouth/Throat:      Mouth: Mucous membranes are moist.      Pharynx: No oropharyngeal exudate.   Eyes:      General: No scleral icterus.     Extraocular Movements: Extraocular movements intact.      Pupils: Pupils are equal, round, and reactive to light.   Cardiovascular:      Rate and Rhythm: Normal rate and regular rhythm.   Pulmonary:      Effort: Pulmonary effort is normal.      Breath sounds: Normal breath sounds.   Abdominal:      General: Abdomen is flat. Bowel sounds are normal.      Palpations: Abdomen is soft.   Musculoskeletal:         General: Swelling and tenderness present. Normal range of motion.      Cervical back: Normal range of motion and neck supple.   Skin:     General: Skin is warm and dry.      Coloration: Skin is not jaundiced.   Neurological:      General: No focal deficit present.      Mental Status: He is alert and oriented to person, place, and time.   Psychiatric:         Mood and Affect: Mood normal.         Thought Content: Thought content normal.         Judgment: Judgment normal.         Significant Labs:  none    Significant Imaging:  reviewed    Assessment/Plan: This gent is stable for a colon exam. The procedure was discussed in detail.     There are no hospital problems to  display for this patient.          Richie Martinez MD  Gastroenterology  [facility] - Endoscopy

## 2024-05-29 NOTE — ANESTHESIA POSTPROCEDURE EVALUATION
Anesthesia Post Evaluation    Patient: Meliton Tyson    Procedure(s) Performed: Procedure(s) (LRB):  COLONOSCOPY (N/A)    Final Anesthesia Type: MAC      Patient location during evaluation: PACU  Patient participation: Yes- Able to Participate  Level of consciousness: awake and alert  Post-procedure vital signs: reviewed and stable  Pain management: adequate  Airway patency: patent    PONV status at discharge: No PONV  Anesthetic complications: no      Cardiovascular status: blood pressure returned to baseline  Respiratory status: unassisted  Hydration status: euvolemic  Follow-up not needed.              Vitals Value Taken Time   /97 05/29/24 0825   Temp 36.6 °C (97.8 °F) 05/29/24 0755   Pulse 68 05/29/24 0825   Resp 18 05/29/24 0825   SpO2 97 % 05/29/24 0825         No case tracking events are documented in the log.      Pain/Jim Score: No data recorded

## 2024-05-30 ENCOUNTER — PATIENT MESSAGE (OUTPATIENT)
Dept: ADMINISTRATIVE | Facility: HOSPITAL | Age: 59
End: 2024-05-30
Payer: MEDICARE

## 2024-06-03 ENCOUNTER — OFFICE VISIT (OUTPATIENT)
Dept: OTOLARYNGOLOGY | Facility: CLINIC | Age: 59
End: 2024-06-03
Payer: MEDICARE

## 2024-06-03 VITALS — DIASTOLIC BLOOD PRESSURE: 94 MMHG | SYSTOLIC BLOOD PRESSURE: 149 MMHG | HEART RATE: 87 BPM

## 2024-06-03 DIAGNOSIS — G47.30 SLEEP-DISORDERED BREATHING: ICD-10-CM

## 2024-06-03 DIAGNOSIS — I69.30 HISTORY OF CVA WITH RESIDUAL DEFICIT: ICD-10-CM

## 2024-06-03 DIAGNOSIS — H81.12 BPPV (BENIGN PAROXYSMAL POSITIONAL VERTIGO), LEFT: Primary | ICD-10-CM

## 2024-06-03 DIAGNOSIS — J34.89 OTHER SPECIFIED DISORDERS OF NOSE AND NASAL SINUSES: ICD-10-CM

## 2024-06-03 DIAGNOSIS — J32.2 CHRONIC ETHMOIDAL SINUSITIS: ICD-10-CM

## 2024-06-03 DIAGNOSIS — J33.8 OTHER POLYP OF SINUS: ICD-10-CM

## 2024-06-03 DIAGNOSIS — J34.3 HYPERTROPHY OF INFERIOR NASAL TURBINATE: ICD-10-CM

## 2024-06-03 PROBLEM — J32.8 OTHER CHRONIC SINUSITIS: Status: ACTIVE | Noted: 2024-06-03

## 2024-06-03 PROCEDURE — 3080F DIAST BP >= 90 MM HG: CPT | Mod: CPTII,S$GLB,, | Performed by: PHYSICIAN ASSISTANT

## 2024-06-03 PROCEDURE — 3044F HG A1C LEVEL LT 7.0%: CPT | Mod: CPTII,S$GLB,, | Performed by: PHYSICIAN ASSISTANT

## 2024-06-03 PROCEDURE — 31231 NASAL ENDOSCOPY DX: CPT | Mod: S$GLB,,, | Performed by: PHYSICIAN ASSISTANT

## 2024-06-03 PROCEDURE — 99999 PR PBB SHADOW E&M-EST. PATIENT-LVL V: CPT | Mod: PBBFAC,,, | Performed by: PHYSICIAN ASSISTANT

## 2024-06-03 PROCEDURE — 99214 OFFICE O/P EST MOD 30 MIN: CPT | Mod: 25,S$GLB,, | Performed by: PHYSICIAN ASSISTANT

## 2024-06-03 PROCEDURE — 3066F NEPHROPATHY DOC TX: CPT | Mod: CPTII,S$GLB,, | Performed by: PHYSICIAN ASSISTANT

## 2024-06-03 PROCEDURE — 3077F SYST BP >= 140 MM HG: CPT | Mod: CPTII,S$GLB,, | Performed by: PHYSICIAN ASSISTANT

## 2024-06-03 PROCEDURE — 4010F ACE/ARB THERAPY RXD/TAKEN: CPT | Mod: CPTII,S$GLB,, | Performed by: PHYSICIAN ASSISTANT

## 2024-06-03 PROCEDURE — 3061F NEG MICROALBUMINURIA REV: CPT | Mod: CPTII,S$GLB,, | Performed by: PHYSICIAN ASSISTANT

## 2024-06-03 PROCEDURE — 1159F MED LIST DOCD IN RCRD: CPT | Mod: CPTII,S$GLB,, | Performed by: PHYSICIAN ASSISTANT

## 2024-06-03 RX ORDER — FLUTICASONE PROPIONATE 50 MCG
2 SPRAY, SUSPENSION (ML) NASAL DAILY
Qty: 16 G | Refills: 11 | Status: SHIPPED | OUTPATIENT
Start: 2024-06-03 | End: 2025-06-03

## 2024-06-03 NOTE — PROCEDURES
"Nasal/sinus endoscopy    Date/Time: 6/3/2024 11:00 AM    Time out: Immediately prior to procedure a "time out" was called to verify the correct patient, procedure, equipment, support staff and site/side marked as required.    Performed by: Prabhjot Cool PA-C  Authorized by: Prabhjot Cool PA-C    Consent Done?:  Yes (Verbal)  Anesthesia:     Local anesthetic:  Lidocaine 2% and Eligio-Synephrine 1/2%    Type of Endoscope:  Flexible    Patient tolerance:  Patient tolerated the procedure well with no immediate complications  Nose:     Procedure Performed:  Nasal Endoscopy  External:      No external nasal deformity  Intranasal:      Mucosa polyps     Mucosa ulcers not present     No mucosa lesions present     Enlarged turbinates     No septum gross deformity  Nasopharynx:      No mucosa lesions     Adenoids present (nonobstructive)     Posterior choanae patent     Eustachian tube patent     Severely edematous L IT  R MM polyp    "

## 2024-06-03 NOTE — PROGRESS NOTES
Subjective:     HPI: Meliton Tyson is a 58 y.o. male with h/o CVA with residual L hemiparesis (wheelchair-bound), CKD III, T2DM, HTN, and h/o PE who was self-referred for multiple chronic issues.     Patient reports daily, perennial, nasal congestion for several years.  He denies any significant hyposmia, facial pressure, or recurrent sinus infections.    Patient reports a long history of snoring.  He reports he has usually a mouth breather but is unclear if he has apneic episodes at night.      Patient has been suffering from at least twice weekly episodes of room spinning dizziness for the past 3 months.  This preceded a recent admission for GI bleeding.  Episodes usually last less than 2-3 minutes usually exacerbated with lying down and sometimes with sitting up.  Patient usually turns his head to the left to resolve symptoms.  Patient denies any associated ear pain, ear drainage, tinnitus, or hearing loss.    Current sinonasal medications include none at present.  Last antibiotic for sinus infection remote  He does not recall previously having allergy testing.  He denies a history of asthma.  He relates a history of reflux symptoms which is currently managed with PRN PPI.    He denies a diagnosis of obstructive sleep apnea.   He does not recall a prior history of nasal trauma.  He has not had sinonasal surgery.    He has not had a tonsillectomy.      Past Medical/Past Surgical History  Past Medical History:   Diagnosis Date    CKD (chronic kidney disease)     Diabetes mellitus     Hypertension     Stroke      He has a past surgical history that includes Colonoscopy (N/A, 5/29/2024).    Family History/Social History  His family history includes Asthma in his mother; Diabetes in his father; Hypertension in his father; Stroke in his sister.  He reports that he has never smoked. He has never been exposed to tobacco smoke. He has never used smokeless tobacco. He reports that he does not drink alcohol and does not  use drugs.    Allergies/Immunizations  He has No Known Allergies.  Immunization History   Administered Date(s) Administered    COVID-19, MRNA, LN-S, PF (Pfizer) (Purple Cap) 03/27/2021, 04/17/2021    Tdap 08/18/2016        Medications   ALLERGY RELIEF (CETIRIZINE) Tab  amLODIPine  atorvastatin  carvediloL  EScitalopram oxalate Tab  ferrous sulfate  fluticasone propionate  losartan  meclizine  metFORMIN  pantoprazole     Review of Systems     Constitutional: Positive for fatigue.      HENT: Positive for stuffy nose.  Negative for trouble swallowing and voice change.      Eyes:  Negative for change in eyesight, eye drainage, eye itching and photophobia.     Respiratory:  Positive for snoring. Negative for sleep apnea.      Cardiovascular:  Negative for chest pain, foot swelling, irregular heartbeat and swollen veins.     Gastrointestinal:  Negative for abdominal pain, acid reflux, constipation, diarrhea, heartburn and vomiting.     Genitourinary: Negative for difficulty urinating, sexual problems and frequent urination.     Musc: Positive for back pain.     Skin: Negative for rash.     Allergy: Negative for food allergies and seasonal allergies.     Endocrine: Negative for cold intolerance and heat intolerance.      Neurological: Positive for dizziness and headaches.     Hematologic: Negative for bruises/bleeds easily and swollen glands.      Psychiatric: Negative for decreased concentration, depression, nervous/anxious and sleep disturbance.            Objective:     BP (!) 149/94 (BP Location: Right arm, Patient Position: Sitting)   Pulse 87      Physical Exam  Vitals reviewed.   Constitutional:       Appearance: Normal appearance.   HENT:      Head: Normocephalic and atraumatic.      Right Ear: Tympanic membrane, ear canal and external ear normal.      Left Ear: Tympanic membrane, ear canal and external ear normal.      Nose: Mucosal edema present. No septal deviation or rhinorrhea.      Right Nostril: No  "epistaxis.      Left Nostril: No epistaxis.      Right Turbinates: Enlarged.      Left Turbinates: Enlarged.      Right Sinus: No maxillary sinus tenderness or frontal sinus tenderness.      Left Sinus: No maxillary sinus tenderness or frontal sinus tenderness.      Comments: L>R Turb hypertrophy       Mouth/Throat:      Lips: Pink.      Mouth: Mucous membranes are moist.      Dentition: Normal dentition.      Tongue: No lesions. Tongue does not deviate from midline.      Palate: No mass and lesions.      Pharynx: Oropharynx is clear. Uvula midline. No uvula swelling.      Tonsils: No tonsillar exudate or tonsillar abscesses. 1+ on the right. 1+ on the left.      Comments: MMP 4  Eyes:      Extraocular Movements: Extraocular movements intact.      Conjunctiva/sclera: Conjunctivae normal.   Musculoskeletal:      Cervical back: No tenderness.   Lymphadenopathy:      Cervical: No cervical adenopathy.   Neurological:      Mental Status: He is alert.   Psychiatric:         Mood and Affect: Mood normal.         Behavior: Behavior normal.          Procedure    Nasal endoscopy performed.  See procedure note.     L NV     L MM polyp     L ET     L NV with edematous L IT     L MT     L ET      Data Reviewed  I personally reviewed the chart, including any outside records, and pertinent data below:    I reviewed the following notes: Internal medicine,     WBC (K/uL)   Date Value   04/07/2024 6.00     Eosinophil % (%)   Date Value   04/07/2024 4.8     Eos # (K/uL)   Date Value   04/07/2024 0.3     Platelets (K/uL)   Date Value   04/07/2024 161     Glucose (mg/dL)   Date Value   04/07/2024 112 (H)     No results found for: "IGE"    I independently reviewed the images of the CT head dated 7/7/2021. Pertinent sinus findings include patchy ethmoid mucosal thickening, possible L anterior MM polyp, L deviated septum, patent OMCs and mild R maxillary mucosal thickening.    Assessment & Plan:     1. BPPV (benign paroxysmal positional " vertigo), left  -     positive left Machias-Hallpike in clinic today  - refer patient to PT for vestibular therapy  - Ambulatory referral/consult to Physical/Occupational Therapy; Future; Expected date: 06/10/2024    2. Other polyp of sinus  3. Chronic ethmoidal sinusitis  4. Other specified disorders of nose and nasal sinuses  5. Hypertrophy of inferior nasal turbinate  -     suspect bilateral nasal obstruction secondary to above.  Advised patient to undergo CT sinus for further evaluation.  Discussed role of intranasal corticoid therapy on nasal obstruction and nasal polyps  - CT Sinuses without Contrast; Future; Expected date: 06/03/2024  -     Nasal/sinus endoscopy  -     fluticasone propionate (FLONASE) 50 mcg/actuation nasal spray; 2 sprays (100 mcg total) by Each Nostril route once daily.  Dispense: 16 g; Refill: 11    6. Sleep-disordered breathing  -     Home Sleep Study; Future  -     Ambulatory referral/consult to Sleep Disorders; Future; Expected date: 06/10/2024    7. History of CVA with residual deficit   - refer patient to PT for vestibular therapy as he has residual left-sided weakness and suspect will have some limitations at home completing Epley maneuvers    He will Follow up in about 6 weeks (around 7/15/2024) for re-evaluate post treatment.  I had a discussion with the patient and his wife  regarding his condition and the further workup and management options.    All questions were answered, and the patient is in agreement with the above.     Disclaimer:  This note may have been prepared utilizing voice recognition software which may result in occasional typographical errors in the text such as sound alike words.   If further clarification is needed, please contact the ENT department of [facility].

## 2024-06-03 NOTE — PATIENT INSTRUCTIONS
"Please contact central scheduling at [phone] or [phone] to schedule your appointment if you do not hear from Sleep medicine.     Nasal Steroid Spray (Flonase, Nasaocort, Rhinocort, Sensimyst)  Generic names for over the counter sprays: fluticasone propionate, triamcinolone acetonide, budesonide  You have been prescribed or instructed to take a nasal steroid spray (Flonase). Some symptoms will experience relief within a few days; however, it may take 2-3 weeks to begin to see improvement. This medication needs to be taken consistently to see results.    Use as directed and please be aware the Flonase takes 7-10 days of consistent use before becoming effective- so try to be patient :)    Helpful hints for maximizing nasal spray medication into the nose  - Use the opposite hand to spray the nostril (example: right hand for left nostril). This will help avoid spraying the medication onto the septum (the area that divides the left and right nasal cavity.)  - Tilt the bottle so that it is facing at a slight angle up or straight back, but avoid pointing the bottle straight up while spraying.   - Gently sniff (do not snort) a few seconds after spraying.     NASAL POLYPS    Nasal polyps are benign nasal tumors that are commonly found in patients with chronic inflammation in their sinuses/nose.  Symptoms of "nasal polyposis" include nasal congestion and diminished sense of smell.  Nasal polyps can effect the drainage of you normal mucous, increasing your risk for frequent sinus infections.    Treatment Options  - Topical treatment: intranasal corticosteroid like Flonase, Rhinocort, or budesonide.  This treatment can help to control the the inflammation in your sinuses and therefore can improve symptoms caused by polyps.  - Oral steroids like prednisone can temporarily help with symptoms from polyps  - Injectable "biologic"agents like Dupilumab (Dupixent) can decrease the size of polyps but usually require " persistent use for complete resolution of symptoms  - Surgery: If polyps are large/numerous or symptoms not improved with medical therapy above, surgical removal of these polyps is the most effective next treatment.  Polyps can grow back after surgery OR after stopping any biologic, so the key is regular intranasal corticosteroid spray and regular follow ups/monitoring by your ENT.

## 2024-06-04 ENCOUNTER — TELEPHONE (OUTPATIENT)
Dept: SLEEP MEDICINE | Facility: OTHER | Age: 59
End: 2024-06-04
Payer: MEDICARE

## 2024-06-05 ENCOUNTER — HOSPITAL ENCOUNTER (OUTPATIENT)
Dept: SLEEP MEDICINE | Facility: OTHER | Age: 59
Discharge: HOME | End: 2024-06-05
Attending: PHYSICIAN ASSISTANT
Payer: MEDICARE

## 2024-06-05 DIAGNOSIS — G47.30 SLEEP-DISORDERED BREATHING: ICD-10-CM

## 2024-06-05 PROCEDURE — 95800 SLP STDY UNATTENDED: CPT

## 2024-06-07 PROBLEM — G47.30 SLEEP-DISORDERED BREATHING: Status: ACTIVE | Noted: 2024-06-07

## 2024-06-08 PROCEDURE — 95800 SLP STDY UNATTENDED: CPT | Mod: 26,52,, | Performed by: INTERNAL MEDICINE

## 2024-06-10 ENCOUNTER — PATIENT MESSAGE (OUTPATIENT)
Dept: OTOLARYNGOLOGY | Facility: CLINIC | Age: 59
End: 2024-06-10
Payer: MEDICARE

## 2024-06-17 ENCOUNTER — OFFICE VISIT (OUTPATIENT)
Dept: SLEEP MEDICINE | Facility: CLINIC | Age: 59
End: 2024-06-17
Payer: MEDICARE

## 2024-06-17 VITALS
SYSTOLIC BLOOD PRESSURE: 132 MMHG | HEART RATE: 86 BPM | HEIGHT: 68 IN | BODY MASS INDEX: 34.32 KG/M2 | DIASTOLIC BLOOD PRESSURE: 91 MMHG | WEIGHT: 226.44 LBS

## 2024-06-17 DIAGNOSIS — G47.33 SEVERE OBSTRUCTIVE SLEEP APNEA: ICD-10-CM

## 2024-06-17 DIAGNOSIS — I10 HYPERTENSION, UNSPECIFIED TYPE: Primary | ICD-10-CM

## 2024-06-17 DIAGNOSIS — G47.30 SLEEP-DISORDERED BREATHING: ICD-10-CM

## 2024-06-17 PROCEDURE — 3080F DIAST BP >= 90 MM HG: CPT | Mod: CPTII,S$GLB,, | Performed by: INTERNAL MEDICINE

## 2024-06-17 PROCEDURE — 1159F MED LIST DOCD IN RCRD: CPT | Mod: CPTII,S$GLB,, | Performed by: INTERNAL MEDICINE

## 2024-06-17 PROCEDURE — 99999 PR PBB SHADOW E&M-EST. PATIENT-LVL III: CPT | Mod: PBBFAC,,, | Performed by: INTERNAL MEDICINE

## 2024-06-17 PROCEDURE — 3061F NEG MICROALBUMINURIA REV: CPT | Mod: CPTII,S$GLB,, | Performed by: INTERNAL MEDICINE

## 2024-06-17 PROCEDURE — 4010F ACE/ARB THERAPY RXD/TAKEN: CPT | Mod: CPTII,S$GLB,, | Performed by: INTERNAL MEDICINE

## 2024-06-17 PROCEDURE — 3008F BODY MASS INDEX DOCD: CPT | Mod: CPTII,S$GLB,, | Performed by: INTERNAL MEDICINE

## 2024-06-17 PROCEDURE — 99204 OFFICE O/P NEW MOD 45 MIN: CPT | Mod: S$GLB,,, | Performed by: INTERNAL MEDICINE

## 2024-06-17 PROCEDURE — 3075F SYST BP GE 130 - 139MM HG: CPT | Mod: CPTII,S$GLB,, | Performed by: INTERNAL MEDICINE

## 2024-06-17 PROCEDURE — 3044F HG A1C LEVEL LT 7.0%: CPT | Mod: CPTII,S$GLB,, | Performed by: INTERNAL MEDICINE

## 2024-06-17 PROCEDURE — 3066F NEPHROPATHY DOC TX: CPT | Mod: CPTII,S$GLB,, | Performed by: INTERNAL MEDICINE

## 2024-06-17 NOTE — PROGRESS NOTES
Referred by Prabhjot Cool PA-C     NEW PATIENT VISIT    Meliton Tyson  is a pleasant 59 y.o. male who presents in the spring of 2024 for sleep evaluation    See assessment below for further history.    Past Medical History:   Diagnosis Date    CKD (chronic kidney disease)     Diabetes mellitus     Hypertension     Stroke      Patient Active Problem List   Diagnosis    Type 2 diabetes mellitus, without long-term current use of insulin    Acute ischemic stroke    Dyslipidemia    Weakness    Class 2 obesity in adult    Essential hypertension    Stage 3a chronic kidney disease    Stroke    Slow transit constipation    Acute deep vein thrombosis (DVT) of femoral vein of left lower extremity    Acute pulmonary embolism    Cerebrovascular accident (CVA) due to embolism    Bilateral pulmonary embolism    Urinary retention    Suspected sleep apnea    Dysarthria    Right pontine stroke    Abnormal echocardiogram    Lower GI bleed    History of CVA with residual deficit    On continuous oral anticoagulation    Other chronic sinusitis    Sleep-disordered breathing       Current Outpatient Medications:     ALLERGY RELIEF, CETIRIZINE, 10 mg tablet, Take 1 tablet by mouth once daily, Disp: 90 tablet, Rfl: 3    amLODIPine (NORVASC) 10 MG tablet, Take 1 tablet (10 mg total) by mouth once daily., Disp: 90 tablet, Rfl: 3    atorvastatin (LIPITOR) 80 MG tablet, Take 1 tablet (80 mg total) by mouth once daily., Disp: 90 tablet, Rfl: 3    carvediloL (COREG) 6.25 MG tablet, Take 1 tablet (6.25 mg total) by mouth 2 (two) times daily with meals., Disp: 180 tablet, Rfl: 3    EScitalopram oxalate (LEXAPRO) 5 MG Tab, Take 1 tablet (5 mg total) by mouth once daily., Disp: 90 tablet, Rfl: 5    ferrous sulfate 325 (65 FE) MG EC tablet, Take 1 tablet (325 mg total) by mouth once daily., Disp: 90 tablet, Rfl: 0    fluticasone propionate (FLONASE) 50 mcg/actuation nasal spray, 2 sprays (100 mcg total) by Each Nostril route once daily.,  "Disp: 16 g, Rfl: 11    losartan (COZAAR) 25 MG tablet, Take 1 tablet (25 mg total) by mouth once daily., Disp: 90 tablet, Rfl: 3    losartan (COZAAR) 50 MG tablet, Take 50 mg by mouth once daily., Disp: , Rfl:     meclizine (ANTIVERT) 25 mg tablet, Take 1 tablet (25 mg total) by mouth 3 (three) times daily as needed for Dizziness or Nausea., Disp: 30 tablet, Rfl: 0    metFORMIN (GLUCOPHAGE) 500 MG tablet, Take 2 tablets (1,000 mg total) by mouth 2 (two) times daily with meals., Disp: 360 tablet, Rfl: 0    pantoprazole (PROTONIX) 40 MG tablet, Take 1 tablet (40 mg total) by mouth once daily., Disp: 90 tablet, Rfl: 3       Vitals:    06/17/24 1303   BP: (!) 132/91   BP Location: Right arm   Patient Position: Sitting   BP Method: Medium (Automatic)   Pulse: 86   Weight: 102.7 kg (226 lb 6.6 oz)   Height: 5' 8" (1.727 m)     Physical Exam:    GEN:   Well-appearing  Psych:  Appropriate affect, demonstrates insight  SKIN:  No rash on the face or bridge of the nose      LABS:   Lab Results   Component Value Date    HGB 7.2 (L) 04/07/2024    CO2 23 04/07/2024         RECORDS REVIEWED:        ASSESSMENT      PROBLEM DESCRIPTION/ Sx on Presentation  STATUS PLAN     Very severe MELONY   Presentation:     + snoring, + witnessed  apneas    Worrel HST 6.6.24: AHI 72, RDI 73               -discussed possible treatments for MELONY including CPAP therapy           Daytime Sx     no sleepiness when inactive   ESS n/a/24 on intake              Insomnia     SLEEP SCHEDULE   Duration    Wind- down    Envmnt    CBTi    Meds prior    Meds now    Bed Time 1-2AM   Lights out    Latency Not long   Arousals 5:30AM -7AM   Back to sleep Takes awhile   Stim. ctrl    Wake time  10:15AM   Caffeine    Naps yes   Nocturia 2   Work                         Likes his sleep patterns     Significant PMH: CVA, HTN, CKD, PE, DM, BMI 34,   Other issues:     RTC:  31-90 days after receiving new machine                      "

## 2024-07-08 PROBLEM — K92.2 LOWER GI BLEED: Status: RESOLVED | Noted: 2024-04-04 | Resolved: 2024-07-08

## 2024-07-15 ENCOUNTER — HOSPITAL ENCOUNTER (OUTPATIENT)
Dept: RADIOLOGY | Facility: HOSPITAL | Age: 59
Discharge: HOME | End: 2024-07-15
Attending: PHYSICIAN ASSISTANT
Payer: MEDICARE

## 2024-07-15 ENCOUNTER — OFFICE VISIT (OUTPATIENT)
Dept: OTOLARYNGOLOGY | Facility: CLINIC | Age: 59
End: 2024-07-15
Payer: MEDICARE

## 2024-07-15 VITALS — SYSTOLIC BLOOD PRESSURE: 145 MMHG | DIASTOLIC BLOOD PRESSURE: 94 MMHG | HEART RATE: 80 BPM

## 2024-07-15 DIAGNOSIS — H81.12 BPPV (BENIGN PAROXYSMAL POSITIONAL VERTIGO), LEFT: ICD-10-CM

## 2024-07-15 DIAGNOSIS — J33.8 OTHER POLYP OF SINUS: ICD-10-CM

## 2024-07-15 DIAGNOSIS — J34.89 OTHER SPECIFIED DISORDERS OF NOSE AND NASAL SINUSES: ICD-10-CM

## 2024-07-15 DIAGNOSIS — J34.3 HYPERTROPHY OF INFERIOR NASAL TURBINATE: ICD-10-CM

## 2024-07-15 DIAGNOSIS — G47.30 SLEEP-DISORDERED BREATHING: ICD-10-CM

## 2024-07-15 DIAGNOSIS — I69.30 HISTORY OF CVA WITH RESIDUAL DEFICIT: ICD-10-CM

## 2024-07-15 DIAGNOSIS — J32.2 CHRONIC ETHMOIDAL SINUSITIS: Primary | ICD-10-CM

## 2024-07-15 PROCEDURE — 3044F HG A1C LEVEL LT 7.0%: CPT | Mod: CPTII,S$GLB,, | Performed by: PHYSICIAN ASSISTANT

## 2024-07-15 PROCEDURE — 3066F NEPHROPATHY DOC TX: CPT | Mod: CPTII,S$GLB,, | Performed by: PHYSICIAN ASSISTANT

## 2024-07-15 PROCEDURE — 4010F ACE/ARB THERAPY RXD/TAKEN: CPT | Mod: CPTII,S$GLB,, | Performed by: PHYSICIAN ASSISTANT

## 2024-07-15 PROCEDURE — 1159F MED LIST DOCD IN RCRD: CPT | Mod: CPTII,S$GLB,, | Performed by: PHYSICIAN ASSISTANT

## 2024-07-15 PROCEDURE — 70486 CT MAXILLOFACIAL W/O DYE: CPT | Mod: TC

## 2024-07-15 PROCEDURE — 99999 PR PBB SHADOW E&M-EST. PATIENT-LVL III: CPT | Mod: PBBFAC,,, | Performed by: PHYSICIAN ASSISTANT

## 2024-07-15 PROCEDURE — 99214 OFFICE O/P EST MOD 30 MIN: CPT | Mod: S$GLB,,, | Performed by: PHYSICIAN ASSISTANT

## 2024-07-15 PROCEDURE — 3077F SYST BP >= 140 MM HG: CPT | Mod: CPTII,S$GLB,, | Performed by: PHYSICIAN ASSISTANT

## 2024-07-15 PROCEDURE — 70486 CT MAXILLOFACIAL W/O DYE: CPT | Mod: 26,,, | Performed by: RADIOLOGY

## 2024-07-15 PROCEDURE — 3080F DIAST BP >= 90 MM HG: CPT | Mod: CPTII,S$GLB,, | Performed by: PHYSICIAN ASSISTANT

## 2024-07-15 PROCEDURE — 3061F NEG MICROALBUMINURIA REV: CPT | Mod: CPTII,S$GLB,, | Performed by: PHYSICIAN ASSISTANT

## 2024-07-15 NOTE — PROGRESS NOTES
Subjective:      Meliton is a 59 y.o. male with h/o CVA with residual L hemiparesis (wheelchair-bound), CKD III, T2DM, severe MELONY, HTN, and h/o PE who comes for follow-up of sinusitis.  His last visit with me was on 6/3/2024.  HST with AHI 72 6/8/24    Patient reports significant improvement in vertigo following therapy.  Patient recently diagnosed with severe sleep apnea and received his CPAP today.  Patient unable to ascertain he has any improved nasal congestion but does report compliance with Flonase.    Per last office visit 6/3/2024 :  Patient reports daily, perennial, nasal congestion for several years.  He denies any significant hyposmia, facial pressure, or recurrent sinus infections.     Patient reports a long history of snoring.  He reports he has usually a mouth breather but is unclear if he has apneic episodes at night.       Patient has been suffering from at least twice weekly episodes of room spinning dizziness for the past 3 months.  This preceded a recent admission for GI bleeding.  Episodes usually last less than 2-3 minutes usually exacerbated with lying down and sometimes with sitting up.  Patient usually turns his head to the left to resolve symptoms.  Patient denies any associated ear pain, ear drainage, tinnitus, or hearing loss.     Current sinonasal medications include none at present.  Last antibiotic for sinus infection remote  He does not recall previously having allergy testing.  He denies a history of asthma.  He relates a history of reflux symptoms which is currently managed with PRN PPI.    He denies a diagnosis of obstructive sleep apnea.   He does not recall a prior history of nasal trauma.  He has not had sinonasal surgery.    He has not had a tonsillectomy.     1. BPPV (benign paroxysmal positional vertigo), left  -     positive left Sheldon-Hallpike in clinic today  -     refer patient to PT for vestibular therapy  - Ambulatory referral/consult to Physical/Occupational Therapy;  Future; Expected date: 06/10/2024  2. Other polyp of sinus  3. Chronic ethmoidal sinusitis  4. Other specified disorders of nose and nasal sinuses  5. Hypertrophy of inferior nasal turbinate  -     suspect bilateral nasal obstruction secondary to above.  Advised patient to undergo CT sinus for further evaluation.  Discussed role of intranasal corticoid therapy on nasal obstruction and nasal polyps  -     CT Sinuses without Contrast; Future; Expected date: 06/03/2024  -     Nasal/sinus endoscopy  -     fluticasone propionate (FLONASE) 50 mcg/actuation nasal spray; 2 sprays (100 mcg total) by Each Nostril route once daily.  Dispense: 16 g; Refill: 11  6. Sleep-disordered breathing  -     Home Sleep Study; Future  -     Ambulatory referral/consult to Sleep Disorders; Future; Expected date: 06/10/2024  7. History of CVA with residual deficit              - refer patient to PT for vestibular therapy as he has residual left-sided weakness and suspect will have some limitations at home completing Epley maneuvers    The patient's medications, allergies, past medical, surgical, social and family histories were reviewed and updated as appropriate.    A detailed review of systems was obtained with pertinent positives as per the above HPI, and otherwise negative.        Objective:     BP (!) 145/94 (BP Location: Right arm, Patient Position: Sitting)   Pulse 80      Physical Exam  Vitals reviewed.   Constitutional:       Appearance: Normal appearance.   HENT:      Head: Normocephalic and atraumatic.      Right Ear: External ear normal.      Left Ear: External ear normal.      Nose: Septal deviation (LEFT) present.      Right Turbinates: Enlarged, swollen and pale.      Left Turbinates: Enlarged, swollen and pale.      Right Sinus: No maxillary sinus tenderness or frontal sinus tenderness.      Left Sinus: No maxillary sinus tenderness or frontal sinus tenderness.   Eyes:      Conjunctiva/sclera: Conjunctivae normal.  "  Pulmonary:      Effort: Pulmonary effort is normal.   Neurological:      Mental Status: He is alert.          Procedure    None    Data Reviewed    WBC (K/uL)   Date Value   04/07/2024 6.00     Eosinophil % (%)   Date Value   04/07/2024 4.8     Eos # (K/uL)   Date Value   04/07/2024 0.3     Platelets (K/uL)   Date Value   04/07/2024 161     Glucose (mg/dL)   Date Value   04/07/2024 112 (H)     No results found for: "IGE"    I independently reviewed the images of the CT sinuses dated today. Pertinent findings include mild left frontal mucosal inflammation, R>L ethmoid disease, L deviated septum, R maxillary mucosal thickening, obstructed OMCs, R MM polyp.  Reviewed imaging with patient and wife.    Assessment:     1. Chronic ethmoidal sinusitis    2. Other polyp of sinus    3. Hypertrophy of inferior nasal turbinate    4. Sleep-disordered breathing    5. BPPV (benign paroxysmal positional vertigo), left    6. History of CVA with residual deficit         Plan:     Discussed treatment options for CRSwNP with patient in detail including surgical intervention.  No significant cardiac history but has chronic DD (nuclear stress test 8/24/22 negative for ischemia)  DM and cerebrovascular disease increase surgical risk for patient (revised cardiac risk score of 3)  Referred to Allergy to consider allergy testing and biologic   Continue INCS  If not improved with above interventions, will refer patient to surgeon.     BPPV significantly improved.  Unable to ascertain if his nasal breathing has improved.     He will follow up as needed  I had a discussion with the patient regarding his condition and the further workup and management options.    All questions were answered, and the patient is in agreement with the above.     Disclaimer:  This note may have been prepared utilizing voice recognition software which may result in occasional typographical errors in the text such as sound alike words.   If further clarification is " needed, please contact the ENT department of [facility].

## 2024-07-15 NOTE — PATIENT INSTRUCTIONS
Please contact central scheduling at [phone] or [phone] to schedule your appointment if you do not hear from referral service within the next week.

## 2024-07-30 ENCOUNTER — OFFICE VISIT (OUTPATIENT)
Dept: CARDIOLOGY | Facility: CLINIC | Age: 59
End: 2024-07-30
Payer: MEDICARE

## 2024-07-30 DIAGNOSIS — Z86.711 HISTORY OF PULMONARY EMBOLISM: Primary | ICD-10-CM

## 2024-07-30 PROCEDURE — 3066F NEPHROPATHY DOC TX: CPT | Mod: CPTII,S$GLB,, | Performed by: INTERNAL MEDICINE

## 2024-07-30 PROCEDURE — 99999 PR PBB SHADOW E&M-EST. PATIENT-LVL III: CPT | Mod: PBBFAC,,, | Performed by: INTERNAL MEDICINE

## 2024-07-30 PROCEDURE — 3061F NEG MICROALBUMINURIA REV: CPT | Mod: CPTII,S$GLB,, | Performed by: INTERNAL MEDICINE

## 2024-07-30 PROCEDURE — 4010F ACE/ARB THERAPY RXD/TAKEN: CPT | Mod: CPTII,S$GLB,, | Performed by: INTERNAL MEDICINE

## 2024-07-30 PROCEDURE — 99214 OFFICE O/P EST MOD 30 MIN: CPT | Mod: S$GLB,,, | Performed by: INTERNAL MEDICINE

## 2024-07-30 PROCEDURE — 3044F HG A1C LEVEL LT 7.0%: CPT | Mod: CPTII,S$GLB,, | Performed by: INTERNAL MEDICINE

## 2024-07-30 PROCEDURE — 1159F MED LIST DOCD IN RCRD: CPT | Mod: CPTII,S$GLB,, | Performed by: INTERNAL MEDICINE

## 2024-07-30 NOTE — PROGRESS NOTES
[facility]    Chief Complaint  Chief Complaint   Patient presents with    Follow-up       HPI:    Presents for follow-up feeling well and without complaints.  Underwent colonoscopy which showed hemorrhoids and diverticulosis.  No active bleeding.  He remains ambulatory at home only using his wheelchair when traveling long distances such as to this appointment.    Medications  Current Outpatient Medications   Medication Sig Dispense Refill    ALLERGY RELIEF, CETIRIZINE, 10 mg tablet Take 1 tablet by mouth once daily 90 tablet 3    amLODIPine (NORVASC) 10 MG tablet Take 1 tablet (10 mg total) by mouth once daily. 90 tablet 3    atorvastatin (LIPITOR) 80 MG tablet Take 1 tablet (80 mg total) by mouth once daily. 90 tablet 3    carvediloL (COREG) 6.25 MG tablet Take 1 tablet (6.25 mg total) by mouth 2 (two) times daily with meals. 180 tablet 3    EScitalopram oxalate (LEXAPRO) 5 MG Tab Take 1 tablet (5 mg total) by mouth once daily. 90 tablet 5    fluticasone propionate (FLONASE) 50 mcg/actuation nasal spray 2 sprays (100 mcg total) by Each Nostril route once daily. 16 g 11    losartan (COZAAR) 25 MG tablet Take 1 tablet (25 mg total) by mouth once daily. 90 tablet 3    losartan (COZAAR) 50 MG tablet Take 50 mg by mouth once daily.      metFORMIN (GLUCOPHAGE) 500 MG tablet Take 2 tablets (1,000 mg total) by mouth 2 (two) times daily with meals. 360 tablet 0    pantoprazole (PROTONIX) 40 MG tablet Take 1 tablet (40 mg total) by mouth once daily. 90 tablet 3    ferrous sulfate 325 (65 FE) MG EC tablet Take 1 tablet (325 mg total) by mouth once daily. (Patient not taking: Reported on 7/30/2024) 90 tablet 0    meclizine (ANTIVERT) 25 mg tablet Take 1 tablet (25 mg total) by mouth 3 (three) times daily as needed for Dizziness or Nausea. (Patient not taking: Reported on 7/30/2024) 30 tablet 0     No current facility-administered medications for this visit.        History  Past Medical History:   Diagnosis Date     CKD (chronic kidney disease)     Diabetes mellitus     Hypertension     Sleep apnea     Stroke      Past Surgical History:   Procedure Laterality Date    COLONOSCOPY N/A 5/29/2024    Procedure: COLONOSCOPY;  Surgeon: Richie Martinez MD;  Location: [facility];  Service: Endoscopy;  Laterality: N/A;     Social History     Socioeconomic History    Marital status:    Tobacco Use    Smoking status: Never     Passive exposure: Never    Smokeless tobacco: Never   Substance and Sexual Activity    Alcohol use: No    Drug use: No    Sexual activity: Yes     Partners: Female     Birth control/protection: None     Social Determinants of Health     Financial Resource Strain: Low Risk  (4/5/2024)    Overall Financial Resource Strain (CARDIA)     Difficulty of Paying Living Expenses: Not hard at all   Food Insecurity: No Food Insecurity (4/5/2024)    Hunger Vital Sign     Worried About Running Out of Food in the Last Year: Never true     Ran Out of Food in the Last Year: Never true   Transportation Needs: No Transportation Needs (4/5/2024)    PRAPARE - Transportation     Lack of Transportation (Medical): No     Lack of Transportation (Non-Medical): No   Physical Activity: Sufficiently Active (4/5/2024)    Exercise Vital Sign     Days of Exercise per Week: 3 days     Minutes of Exercise per Session: 120 min   Stress: No Stress Concern Present (4/5/2024)    [employer] - Occupational Stress Questionnaire     Feeling of Stress : Not at all   Housing Stability: Low Risk  (4/5/2024)    Housing Stability Vital Sign     Unable to Pay for Housing in the Last Year: No     Number of Places Lived in the Last Year: 1     Unstable Housing in the Last Year: No     Family History   Problem Relation Name Age of Onset    Asthma Mother Macy Tyson     Diabetes Father Meliton Tyson     Hypertension Father Meliton Tyson     Stroke Sister Lindsay Gomes         Allergies  Review of patient's  allergies indicates:  No Known Allergies    Review of Systems   Review of Systems   Constitutional: Negative for malaise/fatigue, weight gain and weight loss.   Eyes:  Negative for visual disturbance.   Cardiovascular:  Negative for chest pain, claudication, cyanosis, dyspnea on exertion, irregular heartbeat, leg swelling, near-syncope, orthopnea, palpitations, paroxysmal nocturnal dyspnea and syncope.   Respiratory:  Negative for cough, hemoptysis, shortness of breath, sleep disturbances due to breathing and wheezing.    Hematologic/Lymphatic: Negative for bleeding problem. Does not bruise/bleed easily.   Skin:  Negative for poor wound healing.   Musculoskeletal:  Negative for muscle cramps and myalgias.   Gastrointestinal:  Negative for abdominal pain, anorexia, diarrhea, heartburn, hematemesis, hematochezia, melena, nausea and vomiting.   Genitourinary:  Negative for hematuria and nocturia.   Neurological:  Negative for excessive daytime sleepiness, dizziness, focal weakness, light-headedness and weakness.       Physical Exam  Vitals:    07/30/24 0955   BP: (!) (P) 142/80   Pulse: (P) 74     Wt Readings from Last 1 Encounters:   06/17/24 102.7 kg (226 lb 6.6 oz)     Physical Exam  Vitals and nursing note reviewed.   Constitutional:       General: He is not in acute distress.     Appearance: He is obese. He is not toxic-appearing or diaphoretic.   HENT:      Head: Normocephalic and atraumatic.      Mouth/Throat:      Lips: Pink.      Mouth: Mucous membranes are moist.   Eyes:      General: No scleral icterus.     Conjunctiva/sclera: Conjunctivae normal.   Neck:      Thyroid: No thyromegaly.      Vascular: No carotid bruit, hepatojugular reflux or JVD.      Trachea: Trachea normal.   Cardiovascular:      Rate and Rhythm: Normal rate and regular rhythm. No extrasystoles are present.     Chest Wall: PMI is not displaced.      Pulses:           Carotid pulses are 2+ on the right side and 2+ on the left side.        Radial pulses are 2+ on the right side and 2+ on the left side.        Dorsalis pedis pulses are 2+ on the right side and 2+ on the left side.        Posterior tibial pulses are 2+ on the right side and 2+ on the left side.      Heart sounds: S1 normal and S2 normal. No murmur heard.     No friction rub. No S3 or S4 sounds.   Pulmonary:      Effort: Pulmonary effort is normal. No tachypnea, bradypnea, accessory muscle usage or respiratory distress.      Breath sounds: Normal breath sounds and air entry. No decreased breath sounds, wheezing, rhonchi or rales.   Abdominal:      General: Bowel sounds are normal. There is no distension or abdominal bruit.      Palpations: Abdomen is soft. There is no hepatomegaly, splenomegaly or pulsatile mass.      Tenderness: There is no abdominal tenderness.   Musculoskeletal:         General: No tenderness or deformity.      Right lower leg: No edema.      Left lower leg: No edema.   Skin:     General: Skin is warm and dry.      Capillary Refill: Capillary refill takes less than 2 seconds.      Coloration: Skin is not cyanotic or pale.      Nails: There is no clubbing.   Neurological:      Mental Status: He is alert.   Psychiatric:         Attention and Perception: Attention normal.         Behavior: Behavior is cooperative.         Labs  Admission on 05/29/2024, Discharged on 05/29/2024   Component Date Value Ref Range Status    POCT Glucose 05/29/2024 75  70 - 110 mg/dL Final   Admission on 04/04/2024, Discharged on 04/07/2024   Component Date Value Ref Range Status    WBC 04/04/2024 9.30  3.90 - 12.70 K/uL Final    RBC 04/04/2024 4.03 (L)  4.60 - 6.20 M/uL Final    Hemoglobin 04/04/2024 10.9 (L)  14.0 - 18.0 g/dL Final    Hematocrit 04/04/2024 33.2 (L)  40.0 - 54.0 % Final    MCV 04/04/2024 82  82 - 98 fL Final    MCH 04/04/2024 27.0  27.0 - 31.0 pg Final    MCHC 04/04/2024 32.8  32.0 - 36.0 g/dL Final    RDW 04/04/2024 13.8  11.5 - 14.5 % Final    Platelets 04/04/2024 211  150  - 450 K/uL Final    MPV 04/04/2024 10.4  9.2 - 12.9 fL Final    Immature Granulocytes 04/04/2024 0.3  0.0 - 0.5 % Final    Gran # (ANC) 04/04/2024 6.3  1.8 - 7.7 K/uL Final    Immature Grans (Abs) 04/04/2024 0.03  0.00 - 0.04 K/uL Final    Comment: Mild elevation in immature granulocytes is non specific and   can be seen in a variety of conditions including stress response,   acute inflammation, trauma and pregnancy. Correlation with other   laboratory and clinical findings is essential.      Lymph # 04/04/2024 2.1  1.0 - 4.8 K/uL Final    Mono # 04/04/2024 0.7  0.3 - 1.0 K/uL Final    Eos # 04/04/2024 0.2  0.0 - 0.5 K/uL Final    Baso # 04/04/2024 0.01  0.00 - 0.20 K/uL Final    nRBC 04/04/2024 0  0 /100 WBC Final    Gran % 04/04/2024 68.0  38.0 - 73.0 % Final    Lymph % 04/04/2024 22.0  18.0 - 48.0 % Final    Mono % 04/04/2024 7.1  4.0 - 15.0 % Final    Eosinophil % 04/04/2024 2.5  0.0 - 8.0 % Final    Basophil % 04/04/2024 0.1  0.0 - 1.9 % Final    Differential Method 04/04/2024 Automated   Final    Group & Rh 04/04/2024 B POS   Final    Indirect Leda 04/04/2024 NEG   Final    Specimen Outdate 04/04/2024 04/07/2024 23:59   Final    QRS Duration 04/04/2024 102  ms Final    [facility] QTC Calculation 04/04/2024 435  ms Final    Prothrombin Time 04/04/2024 13.4 (H)  9.0 - 12.5 sec Final    INR 04/04/2024 1.2  0.8 - 1.2 Final    Comment: Coumadin Therapy:  2.0 - 3.0 for INR for all indicators except mechanical heart valves  and antiphospholipid syndromes which should use 2.5 - 3.5.  LOT^040^PT Inn^[id number]      Sodium 04/04/2024 141  136 - 145 mmol/L Final    Potassium 04/04/2024 4.6  3.5 - 5.1 mmol/L Final    Chloride 04/04/2024 106  95 - 110 mmol/L Final    CO2 04/04/2024 24  23 - 29 mmol/L Final    Glucose 04/04/2024 109  70 - 110 mg/dL Final    BUN 04/04/2024 30 (H)  6 - 20 mg/dL Final    Creatinine 04/04/2024 1.5 (H)  0.5 - 1.4 mg/dL Final    Calcium 04/04/2024 9.2  8.7 - 10.5 mg/dL Final    Total Protein 04/04/2024  6.4  6.0 - 8.4 g/dL Final    Albumin 04/04/2024 3.5  3.5 - 5.2 g/dL Final    Total Bilirubin 04/04/2024 0.7  0.1 - 1.0 mg/dL Final    Comment: For infants and newborns, interpretation of results should be based  on gestational age, weight and in agreement with clinical  observations.    Premature Infant recommended reference ranges:  Up to 24 hours.............<8.0 mg/dL  Up to 48 hours............<12.0 mg/dL  3-5 days..................<15.0 mg/dL  6-29 days.................<15.0 mg/dL      Alkaline Phosphatase 04/04/2024 59  55 - 135 U/L Final    AST 04/04/2024 16  10 - 40 U/L Final    ALT 04/04/2024 17  10 - 44 U/L Final    eGFR 04/04/2024 54 (A)  >60 mL/min/1.73 m^2 Final    Anion Gap 04/04/2024 11  8 - 16 mmol/L Final    Hemoglobin 04/04/2024 9.9 (L)  14.0 - 18.0 g/dL Final    Hematocrit 04/04/2024 30.5 (L)  40.0 - 54.0 % Final    Hemoglobin 04/05/2024 10.3 (L)  14.0 - 18.0 g/dL Final    Hematocrit 04/05/2024 31.1 (L)  40.0 - 54.0 % Final    POCT Glucose 04/04/2024 121 (H)  70 - 110 mg/dL Final    WBC 04/05/2024 10.46  3.90 - 12.70 K/uL Final    RBC 04/05/2024 3.19 (L)  4.60 - 6.20 M/uL Final    Hemoglobin 04/05/2024 8.6 (L)  14.0 - 18.0 g/dL Final    Hematocrit 04/05/2024 26.6 (L)  40.0 - 54.0 % Final    MCV 04/05/2024 83  82 - 98 fL Final    MCH 04/05/2024 27.0  27.0 - 31.0 pg Final    MCHC 04/05/2024 32.3  32.0 - 36.0 g/dL Final    RDW 04/05/2024 13.8  11.5 - 14.5 % Final    Platelets 04/05/2024 181  150 - 450 K/uL Final    MPV 04/05/2024 10.3  9.2 - 12.9 fL Final    Immature Granulocytes 04/05/2024 0.4  0.0 - 0.5 % Final    Gran # (ANC) 04/05/2024 6.9  1.8 - 7.7 K/uL Final    Immature Grans (Abs) 04/05/2024 0.04  0.00 - 0.04 K/uL Final    Comment: Mild elevation in immature granulocytes is non specific and   can be seen in a variety of conditions including stress response,   acute inflammation, trauma and pregnancy. Correlation with other   laboratory and clinical findings is essential.      Lymph #  04/05/2024 2.5  1.0 - 4.8 K/uL Final    Mono # 04/05/2024 0.7  0.3 - 1.0 K/uL Final    Eos # 04/05/2024 0.2  0.0 - 0.5 K/uL Final    Baso # 04/05/2024 0.02  0.00 - 0.20 K/uL Final    nRBC 04/05/2024 0  0 /100 WBC Final    Gran % 04/05/2024 66.2  38.0 - 73.0 % Final    Lymph % 04/05/2024 24.1  18.0 - 48.0 % Final    Mono % 04/05/2024 7.1  4.0 - 15.0 % Final    Eosinophil % 04/05/2024 2.0  0.0 - 8.0 % Final    Basophil % 04/05/2024 0.2  0.0 - 1.9 % Final    Differential Method 04/05/2024 Automated   Final    Sodium 04/05/2024 141  136 - 145 mmol/L Final    Potassium 04/05/2024 4.4  3.5 - 5.1 mmol/L Final    Chloride 04/05/2024 108  95 - 110 mmol/L Final    CO2 04/05/2024 26  23 - 29 mmol/L Final    Glucose 04/05/2024 119 (H)  70 - 110 mg/dL Final    BUN 04/05/2024 37 (H)  6 - 20 mg/dL Final    Creatinine 04/05/2024 1.8 (H)  0.5 - 1.4 mg/dL Final    Calcium 04/05/2024 8.8  8.7 - 10.5 mg/dL Final    Total Protein 04/05/2024 5.9 (L)  6.0 - 8.4 g/dL Final    Albumin 04/05/2024 3.3 (L)  3.5 - 5.2 g/dL Final    Total Bilirubin 04/05/2024 0.7  0.1 - 1.0 mg/dL Final    Comment: For infants and newborns, interpretation of results should be based  on gestational age, weight and in agreement with clinical  observations.    Premature Infant recommended reference ranges:  Up to 24 hours.............<8.0 mg/dL  Up to 48 hours............<12.0 mg/dL  3-5 days..................<15.0 mg/dL  6-29 days.................<15.0 mg/dL      Alkaline Phosphatase 04/05/2024 54 (L)  55 - 135 U/L Final    AST 04/05/2024 13  10 - 40 U/L Final    ALT 04/05/2024 16  10 - 44 U/L Final    eGFR 04/05/2024 44 (A)  >60 mL/min/1.73 m^2 Final    Anion Gap 04/05/2024 7 (L)  8 - 16 mmol/L Final    Magnesium 04/05/2024 1.4 (L)  1.6 - 2.6 mg/dL Final    POCT Glucose 04/05/2024 109  70 - 110 mg/dL Final    POCT Glucose 04/05/2024 124 (H)  70 - 110 mg/dL Final    POCT Glucose 04/05/2024 156 (H)  70 - 110 mg/dL Final    POCT Glucose 04/05/2024 133 (H)  70 - 110  mg/dL Final    WBC 04/06/2024 7.64  3.90 - 12.70 K/uL Final    RBC 04/06/2024 2.80 (L)  4.60 - 6.20 M/uL Final    Hemoglobin 04/06/2024 7.6 (L)  14.0 - 18.0 g/dL Final    Hematocrit 04/06/2024 23.5 (L)  40.0 - 54.0 % Final    MCV 04/06/2024 84  82 - 98 fL Final    MCH 04/06/2024 27.1  27.0 - 31.0 pg Final    MCHC 04/06/2024 32.3  32.0 - 36.0 g/dL Final    RDW 04/06/2024 13.8  11.5 - 14.5 % Final    Platelets 04/06/2024 169  150 - 450 K/uL Final    MPV 04/06/2024 10.2  9.2 - 12.9 fL Final    Immature Granulocytes 04/06/2024 0.3  0.0 - 0.5 % Final    Gran # (ANC) 04/06/2024 4.6  1.8 - 7.7 K/uL Final    Immature Grans (Abs) 04/06/2024 0.02  0.00 - 0.04 K/uL Final    Comment: Mild elevation in immature granulocytes is non specific and   can be seen in a variety of conditions including stress response,   acute inflammation, trauma and pregnancy. Correlation with other   laboratory and clinical findings is essential.      Lymph # 04/06/2024 2.0  1.0 - 4.8 K/uL Final    Mono # 04/06/2024 0.6  0.3 - 1.0 K/uL Final    Eos # 04/06/2024 0.4  0.0 - 0.5 K/uL Final    Baso # 04/06/2024 0.02  0.00 - 0.20 K/uL Final    nRBC 04/06/2024 0  0 /100 WBC Final    Gran % 04/06/2024 60.7  38.0 - 73.0 % Final    Lymph % 04/06/2024 26.4  18.0 - 48.0 % Final    Mono % 04/06/2024 7.7  4.0 - 15.0 % Final    Eosinophil % 04/06/2024 4.6  0.0 - 8.0 % Final    Basophil % 04/06/2024 0.3  0.0 - 1.9 % Final    Differential Method 04/06/2024 Automated   Final    Glucose 04/06/2024 101  70 - 110 mg/dL Final    Sodium 04/06/2024 138  136 - 145 mmol/L Final    Potassium 04/06/2024 3.7  3.5 - 5.1 mmol/L Final    Chloride 04/06/2024 107  95 - 110 mmol/L Final    CO2 04/06/2024 24  23 - 29 mmol/L Final    BUN 04/06/2024 26 (H)  6 - 20 mg/dL Final    Calcium 04/06/2024 8.4 (L)  8.7 - 10.5 mg/dL Final    Creatinine 04/06/2024 1.3  0.5 - 1.4 mg/dL Final    Albumin 04/06/2024 3.2 (L)  3.5 - 5.2 g/dL Final    Phosphorus 04/06/2024 3.0  2.7 - 4.5 mg/dL Final     eGFR 04/06/2024 >60  >60 mL/min/1.73 m^2 Final    Anion Gap 04/06/2024 7 (L)  8 - 16 mmol/L Final    POCT Glucose 04/06/2024 97  70 - 110 mg/dL Final    POCT Glucose 04/06/2024 160 (H)  70 - 110 mg/dL Final    POCT Glucose 04/06/2024 107  70 - 110 mg/dL Final    POCT Glucose 04/06/2024 124 (H)  70 - 110 mg/dL Final    WBC 04/07/2024 6.00  3.90 - 12.70 K/uL Final    RBC 04/07/2024 2.66 (L)  4.60 - 6.20 M/uL Final    Hemoglobin 04/07/2024 7.2 (L)  14.0 - 18.0 g/dL Final    Hematocrit 04/07/2024 22.2 (L)  40.0 - 54.0 % Final    MCV 04/07/2024 84  82 - 98 fL Final    MCH 04/07/2024 27.1  27.0 - 31.0 pg Final    MCHC 04/07/2024 32.4  32.0 - 36.0 g/dL Final    RDW 04/07/2024 14.0  11.5 - 14.5 % Final    Platelets 04/07/2024 161  150 - 450 K/uL Final    MPV 04/07/2024 10.2  9.2 - 12.9 fL Final    Immature Granulocytes 04/07/2024 0.2  0.0 - 0.5 % Final    Gran # (ANC) 04/07/2024 3.0  1.8 - 7.7 K/uL Final    Immature Grans (Abs) 04/07/2024 0.01  0.00 - 0.04 K/uL Final    Comment: Mild elevation in immature granulocytes is non specific and   can be seen in a variety of conditions including stress response,   acute inflammation, trauma and pregnancy. Correlation with other   laboratory and clinical findings is essential.      Lymph # 04/07/2024 2.2  1.0 - 4.8 K/uL Final    Mono # 04/07/2024 0.5  0.3 - 1.0 K/uL Final    Eos # 04/07/2024 0.3  0.0 - 0.5 K/uL Final    Baso # 04/07/2024 0.01  0.00 - 0.20 K/uL Final    nRBC 04/07/2024 0  0 /100 WBC Final    Gran % 04/07/2024 50.6  38.0 - 73.0 % Final    Lymph % 04/07/2024 36.2  18.0 - 48.0 % Final    Mono % 04/07/2024 8.0  4.0 - 15.0 % Final    Eosinophil % 04/07/2024 4.8  0.0 - 8.0 % Final    Basophil % 04/07/2024 0.2  0.0 - 1.9 % Final    Differential Method 04/07/2024 Automated   Final    Glucose 04/07/2024 112 (H)  70 - 110 mg/dL Final    Sodium 04/07/2024 139  136 - 145 mmol/L Final    Potassium 04/07/2024 3.3 (L)  3.5 - 5.1 mmol/L Final    Chloride 04/07/2024 108  95 - 110  mmol/L Final    CO2 04/07/2024 23  23 - 29 mmol/L Final    BUN 04/07/2024 17  6 - 20 mg/dL Final    Calcium 04/07/2024 8.1 (L)  8.7 - 10.5 mg/dL Final    Creatinine 04/07/2024 1.2  0.5 - 1.4 mg/dL Final    Albumin 04/07/2024 3.1 (L)  3.5 - 5.2 g/dL Final    Phosphorus 04/07/2024 2.9  2.7 - 4.5 mg/dL Final    eGFR 04/07/2024 >60  >60 mL/min/1.73 m^2 Final    Anion Gap 04/07/2024 8  8 - 16 mmol/L Final    POCT Glucose 04/07/2024 125 (H)  70 - 110 mg/dL Final    POCT Glucose 04/07/2024 141 (H)  70 - 110 mg/dL Final       Imaging  CT Sinuses without Contrast    Result Date: 7/16/2024  EXAMINATION: CT SINUSES WITHOUT CONTRAST CLINICAL HISTORY: Sinonasal obstruction;Sinonasal polyposis;  Other specified disorders of nose and nasal sinuses TECHNIQUE: Axial low-dose images, coronal and sagittal reformations were performed through the head/paranasal sinuses.  Contrast was not administered. COMPARISON: CT head 07/07/2021 FINDINGS: The nasal septum is deviated to the left anteriorly.  Prominent inferior turbinates bilaterally.  Mucosal thickening versus polyp right middle meatus. Mucosal thickening compromises the right greater than left ostiomeatal complexes and frontal recesses.  There is mild mucosal thickening noted within the right maxillary (measuring 5 mm) and right greater than left ethmoid air cells (measuring up to 5 mm).  Minimal frontal sinus mucosal thickening (measuring 2 mm).. Evaluation for developmental variation: There is mild pneumatization overlying the grooves of the anterior ethmoid arteries, the sphenoid sinus is pneumatized below the sella, the inferior orbital canal traverses the maxillary sinus bilaterally. The visualized mastoid air cells are clear.     Nasal septal deviation to the left.  Mucosal thickening versus nasal polyp right middle meatus.  Mucosal thickening compromises the right greater than left ostiomeatal complexes and frontal recesses.  Mild scattered sinus mucosal thickening is  detailed above. Electronically signed by resident: Carter Grande Date:    07/15/2024 Time:    11:13 Electronically signed by: Carter Abdalla Date:    07/16/2024 Time:    06:35      Assessment  1. History of pulmonary embolism  Three years ago.  Provoked.      Plan and Discussion    At this point it would seem the risk of anticoagulation outweighs any potential benefit.  A correctable cause of bleeding has not been found.  Will see on an as-needed basis.    The ASCVD Risk score (Parrish DK, et al., 2019) failed to calculate for the following reasons:    The patient has a prior MI or stroke diagnosis     Follow Up  Follow up if symptoms worsen or fail to improve.      Rupert Frank MD

## 2024-09-03 ENCOUNTER — LAB VISIT (OUTPATIENT)
Dept: LAB | Facility: HOSPITAL | Age: 59
End: 2024-09-03
Attending: STUDENT IN AN ORGANIZED HEALTH CARE EDUCATION/TRAINING PROGRAM
Payer: MEDICARE

## 2024-09-03 ENCOUNTER — OFFICE VISIT (OUTPATIENT)
Dept: ALLERGY | Facility: CLINIC | Age: 59
End: 2024-09-03
Payer: MEDICARE

## 2024-09-03 VITALS
OXYGEN SATURATION: 96 % | DIASTOLIC BLOOD PRESSURE: 91 MMHG | BODY MASS INDEX: 33.98 KG/M2 | HEIGHT: 68 IN | WEIGHT: 224.19 LBS | HEART RATE: 78 BPM | SYSTOLIC BLOOD PRESSURE: 138 MMHG

## 2024-09-03 DIAGNOSIS — I51.89 DIASTOLIC DYSFUNCTION: ICD-10-CM

## 2024-09-03 DIAGNOSIS — Z86.73 HISTORY OF CVA (CEREBROVASCULAR ACCIDENT): ICD-10-CM

## 2024-09-03 DIAGNOSIS — J34.3 HYPERTROPHY OF INFERIOR NASAL TURBINATE: ICD-10-CM

## 2024-09-03 DIAGNOSIS — J33.9 NASAL POLYPS: ICD-10-CM

## 2024-09-03 DIAGNOSIS — J31.0 CHRONIC RHINITIS: Primary | ICD-10-CM

## 2024-09-03 DIAGNOSIS — J32.2 CHRONIC ETHMOIDAL SINUSITIS: ICD-10-CM

## 2024-09-03 DIAGNOSIS — J31.0 CHRONIC RHINITIS: ICD-10-CM

## 2024-09-03 PROBLEM — E66.812 CLASS 2 OBESITY IN ADULT: Status: RESOLVED | Noted: 2021-06-05 | Resolved: 2024-09-03

## 2024-09-03 PROBLEM — E66.9 CLASS 2 OBESITY IN ADULT: Status: RESOLVED | Noted: 2021-06-05 | Resolved: 2024-09-03

## 2024-09-03 LAB — IGE SERPL-ACNC: 36 IU/ML (ref 0–100)

## 2024-09-03 PROCEDURE — 3044F HG A1C LEVEL LT 7.0%: CPT | Mod: CPTII,S$GLB,, | Performed by: STUDENT IN AN ORGANIZED HEALTH CARE EDUCATION/TRAINING PROGRAM

## 2024-09-03 PROCEDURE — 4010F ACE/ARB THERAPY RXD/TAKEN: CPT | Mod: CPTII,S$GLB,, | Performed by: STUDENT IN AN ORGANIZED HEALTH CARE EDUCATION/TRAINING PROGRAM

## 2024-09-03 PROCEDURE — 3075F SYST BP GE 130 - 139MM HG: CPT | Mod: CPTII,S$GLB,, | Performed by: STUDENT IN AN ORGANIZED HEALTH CARE EDUCATION/TRAINING PROGRAM

## 2024-09-03 PROCEDURE — 3066F NEPHROPATHY DOC TX: CPT | Mod: CPTII,S$GLB,, | Performed by: STUDENT IN AN ORGANIZED HEALTH CARE EDUCATION/TRAINING PROGRAM

## 2024-09-03 PROCEDURE — 3080F DIAST BP >= 90 MM HG: CPT | Mod: CPTII,S$GLB,, | Performed by: STUDENT IN AN ORGANIZED HEALTH CARE EDUCATION/TRAINING PROGRAM

## 2024-09-03 PROCEDURE — 86003 ALLG SPEC IGE CRUDE XTRC EA: CPT | Mod: 59 | Performed by: STUDENT IN AN ORGANIZED HEALTH CARE EDUCATION/TRAINING PROGRAM

## 2024-09-03 PROCEDURE — 86003 ALLG SPEC IGE CRUDE XTRC EA: CPT | Performed by: STUDENT IN AN ORGANIZED HEALTH CARE EDUCATION/TRAINING PROGRAM

## 2024-09-03 PROCEDURE — 3061F NEG MICROALBUMINURIA REV: CPT | Mod: CPTII,S$GLB,, | Performed by: STUDENT IN AN ORGANIZED HEALTH CARE EDUCATION/TRAINING PROGRAM

## 2024-09-03 PROCEDURE — 1160F RVW MEDS BY RX/DR IN RCRD: CPT | Mod: CPTII,S$GLB,, | Performed by: STUDENT IN AN ORGANIZED HEALTH CARE EDUCATION/TRAINING PROGRAM

## 2024-09-03 PROCEDURE — 1159F MED LIST DOCD IN RCRD: CPT | Mod: CPTII,S$GLB,, | Performed by: STUDENT IN AN ORGANIZED HEALTH CARE EDUCATION/TRAINING PROGRAM

## 2024-09-03 PROCEDURE — 99203 OFFICE O/P NEW LOW 30 MIN: CPT | Mod: S$GLB,,, | Performed by: STUDENT IN AN ORGANIZED HEALTH CARE EDUCATION/TRAINING PROGRAM

## 2024-09-03 PROCEDURE — 82785 ASSAY OF IGE: CPT | Performed by: STUDENT IN AN ORGANIZED HEALTH CARE EDUCATION/TRAINING PROGRAM

## 2024-09-03 PROCEDURE — 3008F BODY MASS INDEX DOCD: CPT | Mod: CPTII,S$GLB,, | Performed by: STUDENT IN AN ORGANIZED HEALTH CARE EDUCATION/TRAINING PROGRAM

## 2024-09-03 PROCEDURE — 99999 PR PBB SHADOW E&M-EST. PATIENT-LVL III: CPT | Mod: PBBFAC,,, | Performed by: STUDENT IN AN ORGANIZED HEALTH CARE EDUCATION/TRAINING PROGRAM

## 2024-09-03 RX ORDER — FLUTICASONE PROPIONATE 50 MCG
2 SPRAY, SUSPENSION (ML) NASAL 2 TIMES DAILY
Qty: 32 G | Refills: 5 | Status: SHIPPED | OUTPATIENT
Start: 2024-09-03 | End: 2025-09-03

## 2024-09-03 NOTE — PROGRESS NOTES
Allergy Clinic Note  [facility]    This note was created by combination of typed  and dictation. Transcription errors are likely.  If there are any questions, please contact me.    Subjective:      Patient ID: Meliton Tyson is a 59 y.o. male.    Chief Complaint: Sinus Problem and Allergies      Referring Provider: Prabhjot Cool    History of Present Illness: Meliton Tyson is a 59 y.o. male status post CVA and with history of vertigo and chronic sinusitis referred from ENT for continuing care of chronic rhinosinusitis.    Related medications and other interventions  Flonase 2 sprays daily  cetirizine 10 mg  (beta blocker)  (Protonix 40 mg daily)      09/03/2024:  At initial visit, client reported a long history of nasal congestion not adequately controlled on fluticasone 2 squirts daily.  His symptoms are most pronounced in the morning after using a CPAP.  Other symptoms include snorting sounds and throat clearing.  Symptoms are perennial without variation, and there are no clear precipitants.  Workup is significant for sinus CT showing chronic sinusitis and for ENT endoscopy showing nonobstructing nasal polyp.  He has never had allergy testing.       MEDICAL HISTORY      Significant past medical history:  Status post CVA due to embolism with residual left-sided weakness, occasional wheelchair user, hypertension, diabetes, diastolic dysfunction history lower GI bleed  Active problem list reviewed  ENT surgery:  None   Significant family history:  Mother with asthma  Exposures:  Smoking Hx:  Client  reports that he has never smoked. He has never been exposed to tobacco smoke. He has never used smokeless tobacco.    Meds: MAR reviewed    Asthma:  No  Eczema:  No  Rhinitis:  See HPI  Drug allergy/intolerance:  NKDA  Venom allergy:  No  Latex allergy:  No    Patient Active Problem List   Diagnosis    Type 2 diabetes mellitus, without long-term current use of insulin    Acute ischemic stroke     Dyslipidemia    Weakness    Essential hypertension    Stage 3a chronic kidney disease    Stroke    Slow transit constipation    Acute deep vein thrombosis (DVT) of femoral vein of left lower extremity    Acute pulmonary embolism    Cerebrovascular accident (CVA) due to embolism    Bilateral pulmonary embolism    Urinary retention    Suspected sleep apnea    Dysarthria    Right pontine stroke    Abnormal echocardiogram    History of CVA with residual deficit    On continuous oral anticoagulation    Other chronic sinusitis    Sleep-disordered breathing     Medication List with Changes/Refills   Current Medications    ALLERGY RELIEF, CETIRIZINE, 10 MG TABLET    Take 1 tablet by mouth once daily       Start Date: 2/16/2024 End Date: --    AMLODIPINE (NORVASC) 10 MG TABLET    Take 1 tablet (10 mg total) by mouth once daily.       Start Date: 1/22/2024 End Date: --    ATORVASTATIN (LIPITOR) 80 MG TABLET    Take 1 tablet (80 mg total) by mouth once daily.       Start Date: 1/22/2024 End Date: --    CARVEDILOL (COREG) 6.25 MG TABLET    Take 1 tablet (6.25 mg total) by mouth 2 (two) times daily with meals.       Start Date: 1/22/2024 End Date: --    ESCITALOPRAM OXALATE (LEXAPRO) 5 MG TAB    Take 1 tablet (5 mg total) by mouth once daily.       Start Date: 1/22/2024 End Date: --    LOSARTAN (COZAAR) 25 MG TABLET    Take 1 tablet (25 mg total) by mouth once daily.       Start Date: 4/2/2024  End Date: --    LOSARTAN (COZAAR) 50 MG TABLET    Take 50 mg by mouth once daily.       Start Date: --        End Date: --    METFORMIN (GLUCOPHAGE) 500 MG TABLET    Take 2 tablets (1,000 mg total) by mouth 2 (two) times daily with meals.       Start Date: 6/17/2024 End Date: --    PANTOPRAZOLE (PROTONIX) 40 MG TABLET    Take 1 tablet (40 mg total) by mouth once daily.       Start Date: 1/22/2024 End Date: --   Changed and/or Refilled Medications    Modified Medication Previous Medication    FLUTICASONE PROPIONATE (FLONASE) 50  "MCG/ACTUATION NASAL SPRAY fluticasone propionate (FLONASE) 50 mcg/actuation nasal spray       2 sprays (100 mcg total) by Each Nostril route 2 (two) times a day.    2 sprays (100 mcg total) by Each Nostril route once daily.       Start Date: 9/3/2024  End Date: 9/3/2025    Start Date: 6/3/2024  End Date: 9/3/2024   Discontinued Medications    FERROUS SULFATE 325 (65 FE) MG EC TABLET    Take 1 tablet (325 mg total) by mouth once daily.       Start Date: 4/7/2024  End Date: 9/3/2024    MECLIZINE (ANTIVERT) 25 MG TABLET    Take 1 tablet (25 mg total) by mouth 3 (three) times daily as needed for Dizziness or Nausea.       Start Date: 2/16/2024 End Date: 9/3/2024         REVIEW OF SYSTEMS      CONST: no F/C/NS, no unintentional weight changes  NEURO:  no tremor, no weakness  EYES: no discharge, no erythema  EARS: no hearing loss, no sensation of fullness  PULM:  no SOB, no wheezing, no cough  CV: no CP, no palpitations  DERM: no rashes, no skin breaks    PHYSICAL EXAM     BP (!) 138/91 (BP Location: Left arm, Patient Position: Sitting, BP Method: Large (Automatic))   Pulse 78   Ht 5' 8" (1.727 m)   Wt 101.7 kg (224 lb 3.3 oz)   SpO2 96%   BMI 34.09 kg/m²   GEN: Awake and alert, no distress, seated in wheelchair  DERM: No flushing, No rashes  EYE:  No ocular discharge  HENT: No nasal discharge, no hoarseness  PULM: Normal work of breathing, no cough,  Nares are pale with moderate turbinate swelling.  Oropharynx is benign without exudate.  Tongue is not coated and moves asymmetrically.  NECK:  + anterior cervical adenopathy  NEURO:  Decreased strength on right side of body  PSYCH: appropriate affect, normal behavior    MEDICAL DECISION MAKING     Data reviewed (new entries in bold-face)      Allergy Testing      Ordered      Lab results      EO count 300, 2024      Imaging and other diagnostics      ENT endoscopy (06/03/2024)   Intranasal:      Mucosa polyps      Mucosa ulcers not present      No mucosa lesions " "present      Enlarged turbinates      No septum gross deformity   Nasopharynx:      No mucosa lesions      Adenoids present (nonobstructive)      Posterior choanae patent      Eustachian tube patent      Severely edematous L IT   R MM polyp     CT sinus without contrast (06/03/2024):  The nasal septum is deviated to the left anteriorly.  Prominent inferior turbinates bilaterally.  Mucosal thickening versus polyp right middle meatus.   -Mucosal thickening compromises the right greater than left ostiomeatal complexes and frontal recesses.  There is mild mucosal thickening noted within the right maxillary (measuring 5 mm) and right greater than left ethmoid air cells (measuring up to 5 mm).  Minimal frontal sinus mucosal thickening (measuring 2 mm)."      Medical records review   At initial visit reviewed ENT note of 07/15/2024.  Patient complained of continued nasal congestion despite therapy with Flonase.  Physical exam was notable for septal deviation to the left and for swollen enlarged pale turbinates bilaterally.  Endoscopy of 6/3/24 showed right mucosal polyp.  CT sinus showed significant mucosal thickening including of the osteotomy anal complexes and frontal recesses bilaterally as well as mucosal thickening in the R ethmoid and maxillary sinuses.  He was deemed high-risk for surgery and referred to Allergy for medical management.    Diagnoses:     Meliton Tyson is a 59 y.o. male. with  1. Chronic rhinitis    2. Chronic ethmoidal and maxillary sinusitis with OM comppromise    3. Hypertrophy of inferior nasal turbinate    4. Nasal polyps    5. History of CVA (cerebrovascular accident)    6. Diastolic dysfunction          Plan:   Mr Tyson is presenting with nasal congestion.  He has pale, moderately swollen nasal turbinates which may suggest underlying allergy.  He has known chronic sinusitis by CT and nonobstructing nasal polyp by ENT endoscopy.  Diagnostically, I recommend screening for allergies by " "the Immunocap method.  Therapeutically I recommend increasing Flonase to 2 squirts each nostril twice daily.      Comorbidities  Status post embolic CVA with residual hemiparesis -- noted; deemed suboptimal surgical candidate at ENT  Diastolic dysfunction --                                                                      "        "               "    PATIENT INSTRUCTIONS AND FOLLOW-UP     There are no Patient Instructions on file for this visit.    No follow-ups on file.        Yuki Mireles MD  Allergy, Asthma & Immunology            "

## 2024-09-06 LAB
A ALTERNATA IGE QN: <0.1 KU/L
A FUMIGATUS IGE QN: <0.1 KU/L
BERMUDA GRASS IGE QN: 1.59 KU/L
CAT DANDER IGE QN: <0.1 KU/L
CEDAR IGE QN: <0.1 KU/L
D FARINAE IGE QN: <0.1 KU/L
D PTERONYSS IGE QN: <0.1 KU/L
DEPRECATED CEDAR IGE RAST QL: NORMAL
DEPRECATED TIMOTHY IGE RAST QL: ABNORMAL
DOG DANDER IGE QN: <0.1 KU/L
ENGL PLANTAIN IGE QN: 0.15 KU/L
PECAN/HICK TREE IGE QN: 0.12 KU/L
RAST CLASS: ABNORMAL
RAST CLASS: NORMAL
TIMOTHY IGE QN: 2.12 KU/L
WEST RAGWEED IGE QN: 0.17 KU/L
WHITE OAK IGE QN: 0.12 KU/L

## 2024-09-09 NOTE — PROGRESS NOTES
The patient location is: [place]  The chief complaint leading to consultation is: trouble with sleep    Visit type: audiovisual    20 minutes of total time spent on the encounter, which includes face to face time and non-face to face time preparing to see the patient (eg, review of tests), Obtaining and/or reviewing separately obtained history, Documenting clinical information in the electronic or other health record, Independently interpreting results (not separately reported) and communicating results to the patient/family/caregiver, or Care coordination (not separately reported).     Each patient to whom he or she provides medical services by telemedicine is:  (1) informed of the relationship between the physician and patient and the respective role of any other health care provider with respect to management of the patient; and (2) notified that he or she may decline to receive medical services by telemedicine and may withdraw from such care at any time.     ESTABLISHED PATIENT VISIT    Meliton Tyson  is a pleasant 59 y.o. male who presented in the spring of 2024 for sleep evaluation    LOV:     Here today for:  follow-up     Since last visit:   See assessment below    SLEEP SCHEDULE   Duration    Wind- down    Envmnt    CBTi    Meds prior    Meds now    Bed Time 1-2AM   Lights out    Latency Not long   Arousals 5:30AM -7AM   Back to sleep Takes awhile   Stim. ctrl    Wake time  10:15AM   Caffeine    Naps yes   Nocturia 2   Work                Past Medical History:   Diagnosis Date    CKD (chronic kidney disease)     Diabetes mellitus     Hypertension     Sleep apnea     Stroke      Patient Active Problem List   Diagnosis    Type 2 diabetes mellitus, without long-term current use of insulin    Acute ischemic stroke    Dyslipidemia    Weakness    Essential hypertension    Stage 3a chronic kidney disease    Stroke    Slow transit constipation    Acute deep vein thrombosis (DVT) of femoral vein of left lower  extremity    Acute pulmonary embolism    Cerebrovascular accident (CVA) due to embolism    Bilateral pulmonary embolism    Urinary retention    Suspected sleep apnea    Dysarthria    Right pontine stroke    Abnormal echocardiogram    History of CVA with residual deficit    On continuous oral anticoagulation    Other chronic sinusitis    Sleep-disordered breathing       Current Outpatient Medications:     ALLERGY RELIEF, CETIRIZINE, 10 mg tablet, Take 1 tablet by mouth once daily, Disp: 90 tablet, Rfl: 3    amLODIPine (NORVASC) 10 MG tablet, Take 1 tablet (10 mg total) by mouth once daily., Disp: 90 tablet, Rfl: 3    atorvastatin (LIPITOR) 80 MG tablet, Take 1 tablet (80 mg total) by mouth once daily., Disp: 90 tablet, Rfl: 3    carvediloL (COREG) 6.25 MG tablet, Take 1 tablet (6.25 mg total) by mouth 2 (two) times daily with meals., Disp: 180 tablet, Rfl: 3    EScitalopram oxalate (LEXAPRO) 5 MG Tab, Take 1 tablet (5 mg total) by mouth once daily., Disp: 90 tablet, Rfl: 5    fluticasone propionate (FLONASE) 50 mcg/actuation nasal spray, 2 sprays (100 mcg total) by Each Nostril route 2 (two) times a day., Disp: 32 g, Rfl: 5    losartan (COZAAR) 25 MG tablet, Take 1 tablet (25 mg total) by mouth once daily., Disp: 90 tablet, Rfl: 3    losartan (COZAAR) 50 MG tablet, Take 50 mg by mouth once daily., Disp: , Rfl:     metFORMIN (GLUCOPHAGE) 500 MG tablet, Take 2 tablets (1,000 mg total) by mouth 2 (two) times daily with meals., Disp: 360 tablet, Rfl: 0    pantoprazole (PROTONIX) 40 MG tablet, Take 1 tablet (40 mg total) by mouth once daily., Disp: 90 tablet, Rfl: 3       There were no vitals filed for this visit.    Physical Exam:    GEN:   Well-appearing  Psych:  Appropriate affect, demonstrates insight  SKIN:  No rash on the face or bridge of the nose      LABS:   Lab Results   Component Value Date    HGB 7.2 (L) 04/07/2024    CO2 23 04/07/2024         RECORDS REVIEWED:        ASSESSMENT    Significant PMH: CVA, HTN,  CKD, PE, DM, BMI 34,   PROBLEM DESCRIPTION/ Sx on Presentation Interval Hx STATUS PLAN     Very severe MELONY   Presentation:     + snoring, + witnessed  apneas    Worrel HST 6.6.24: AHI 72, RDI 73    PAP history   Dx Study    Mask FFM   DME HME   My Air    CPAP age 7.15.24: AV! compliant   PAP altn    Benefits Resting better   PROBS Dry throat- has regular tube only             Since last visit:     Received CPAP    9.8.24: 29/30 x [id number], 6-12 (11.8/11.9/12), leak 0/7/40, AHI 56 (o 47)    Feels things are going well    Having some dryness in his throat       partially controlled    AHI still elevated on 6-12       PAP PLAN   E min 6 cwp (incr to 12)   I max 12 cwp (incr 15)   PS/epr    RAMP 5 x auto (turned off)   Other    Altn.        -adjust humidity- get CCL  If not doing well with auto-CPAP titration:  -will proceed with titration  due to elevated AHI on auto-CPAP download despite troubleshooting          The patient is using and benefitting from PAP therapy       Nocutira   About 2 x per night    Less often   partially controlled       -MLEONY management as above       Sinus congestion   Antihistamine:   Nasal sprays: flonase  Surgeries:          Controlled on flonast   controlled   -continue flonase     Other issues:     RTC:  in approximately 3-4 months

## 2024-09-10 ENCOUNTER — OFFICE VISIT (OUTPATIENT)
Dept: SLEEP MEDICINE | Facility: CLINIC | Age: 59
End: 2024-09-10
Payer: MEDICARE

## 2024-09-10 DIAGNOSIS — J30.9 ALLERGIC SINUSITIS: ICD-10-CM

## 2024-09-10 DIAGNOSIS — R35.1 NOCTURIA: ICD-10-CM

## 2024-09-10 DIAGNOSIS — G47.33 OSA (OBSTRUCTIVE SLEEP APNEA): Primary | ICD-10-CM

## 2024-09-10 PROCEDURE — 99214 OFFICE O/P EST MOD 30 MIN: CPT | Mod: 95,,, | Performed by: INTERNAL MEDICINE

## 2024-09-10 PROCEDURE — 3061F NEG MICROALBUMINURIA REV: CPT | Mod: CPTII,95,, | Performed by: INTERNAL MEDICINE

## 2024-09-10 PROCEDURE — 4010F ACE/ARB THERAPY RXD/TAKEN: CPT | Mod: CPTII,95,, | Performed by: INTERNAL MEDICINE

## 2024-09-10 PROCEDURE — 3066F NEPHROPATHY DOC TX: CPT | Mod: CPTII,95,, | Performed by: INTERNAL MEDICINE

## 2024-09-10 PROCEDURE — 3044F HG A1C LEVEL LT 7.0%: CPT | Mod: CPTII,95,, | Performed by: INTERNAL MEDICINE

## 2024-10-04 DIAGNOSIS — E11.22 TYPE 2 DIABETES MELLITUS WITH STAGE 3 CHRONIC KIDNEY DISEASE, WITHOUT LONG-TERM CURRENT USE OF INSULIN, UNSPECIFIED WHETHER STAGE 3A OR 3B CKD: ICD-10-CM

## 2024-10-04 DIAGNOSIS — N18.30 TYPE 2 DIABETES MELLITUS WITH STAGE 3 CHRONIC KIDNEY DISEASE, WITHOUT LONG-TERM CURRENT USE OF INSULIN, UNSPECIFIED WHETHER STAGE 3A OR 3B CKD: ICD-10-CM

## 2024-10-04 RX ORDER — METFORMIN HYDROCHLORIDE 500 MG/1
1000 TABLET ORAL 2 TIMES DAILY WITH MEALS
Qty: 360 TABLET | Refills: 1 | Status: SHIPPED | OUTPATIENT
Start: 2024-10-04

## 2024-10-04 NOTE — TELEPHONE ENCOUNTER
Refill Routing Note   Medication(s) are not appropriate for processing by [facility] for the following reason(s):        Required labs outdated  ED/Hospital Visit since last OV with provider    [facility] action(s):  Defer     Requires labs : Yes             Appointments  past 12m or future 3m with PCP    Date Provider   Last Visit   1/22/2024 Rodrick Angulo MD   Next Visit   Visit date not found Rodrick Angulo MD   ED visits in past 90 days: 0        Note composed:1:32 PM 10/04/2024

## 2024-10-04 NOTE — TELEPHONE ENCOUNTER
Care Due:                  Date            Visit Type   Department     Provider  --------------------------------------------------------------------------------                                EP -                              PRIMARY      [facility] INTERNAL  Rodrick Ever  Last Visit: 01-      [facility]   [facility]  Next Visit: None Scheduled  None         None Found                                                            Last  Test          Frequency    Reason                     Performed    Due Date  --------------------------------------------------------------------------------    HBA1C.......  6 months...  metFORMIN................  01- 07-    [facility] Embedded Care Due Messages. Reference number: [id number].   10/04/2024 8:05:28 AM CDT

## 2024-10-10 ENCOUNTER — PATIENT MESSAGE (OUTPATIENT)
Dept: ADMINISTRATIVE | Facility: HOSPITAL | Age: 59
End: 2024-10-10
Payer: MEDICARE

## 2024-10-10 DIAGNOSIS — E11.9 TYPE 2 DIABETES MELLITUS WITHOUT COMPLICATION, UNSPECIFIED WHETHER LONG TERM INSULIN USE: ICD-10-CM

## 2024-10-18 ENCOUNTER — TELEPHONE (OUTPATIENT)
Dept: INTERNAL MEDICINE | Facility: CLINIC | Age: 59
End: 2024-10-18
Payer: MEDICARE

## 2024-10-18 NOTE — TELEPHONE ENCOUNTER
----- Message from Digna sent at 10/18/2024  2:30 PM CDT -----  Regarding: medication  Name of caller:becky       What is the requesting detail: pt is requesting medication. States his foot is swelling. Please give him a call back to further discuss.       Can the clinic reply by [facility]:       What number to call back: [phone]

## 2024-11-05 NOTE — DISCHARGE SUMMARY
[facility] - Endoscopy  Discharge Note  Short Stay    Procedure(s) (LRB):  COLONOSCOPY (N/A)      OUTCOME: Patient tolerated treatment/procedure well without complication and is now ready for discharge.    DISPOSITION: Home or Self Care    FINAL DIAGNOSIS:  diverticulosis  Internal hemorrhoids    FOLLOWUP: In clinic in 3 months    DISCHARGE INSTRUCTIONS:  1) resume all medications  2) Resume previous diet  3) Call office for any questions or concerns  40 Screening colonoscopy in 10 years       no

## 2024-11-13 ENCOUNTER — PATIENT MESSAGE (OUTPATIENT)
Dept: ADMINISTRATIVE | Facility: HOSPITAL | Age: 59
End: 2024-11-13
Payer: MEDICARE

## 2024-11-24 NOTE — TELEPHONE ENCOUNTER
Care Due:                  Date            Visit Type   Department     Provider  --------------------------------------------------------------------------------                                EP -                              [facility] INTERNAL  Rodrick Curtis  Last Visit: 01-      CARE ([facility])   [facility]                              [facility]  Next Visit: 12-      [facility] ([facility]                                                            Last  Test          Frequency    Reason                     Performed    Due Date  --------------------------------------------------------------------------------    Lipid Panel.  12 months..  atorvastatin.............  01- 01-    [facility] Embedded Care Due Messages. Reference number: [id number].   11/24/2024 8:03:05 AM CST

## 2024-11-25 RX ORDER — PANTOPRAZOLE SODIUM 40 MG/1
40 TABLET, DELAYED RELEASE ORAL
Qty: 90 TABLET | Refills: 0 | Status: SHIPPED | OUTPATIENT
Start: 2024-11-25

## 2024-11-25 NOTE — TELEPHONE ENCOUNTER
Refill Routing Note   Medication(s) are not appropriate for processing by [facility] for the following reason(s):        ED/Hospital Visit since last OV with provider    [facility] action(s):  Defer     Requires labs : Yes      Medication Therapy Plan: ED visit for GI Bleeding; Please advise      Appointments  past 12m or future 3m with PCP    Date Provider   Last Visit   1/22/2024 Rodrick Angulo MD   Next Visit   12/23/2024 Rodrick Angulo MD   ED visits in past 90 days: 0        Note composed:10:12 AM 11/25/2024

## 2024-12-03 ENCOUNTER — PATIENT MESSAGE (OUTPATIENT)
Dept: SLEEP MEDICINE | Facility: CLINIC | Age: 59
End: 2024-12-03

## 2024-12-30 ENCOUNTER — OFFICE VISIT (OUTPATIENT)
Dept: INTERNAL MEDICINE | Facility: CLINIC | Age: 59
End: 2024-12-30
Attending: FAMILY MEDICINE
Payer: MEDICARE

## 2024-12-30 VITALS
WEIGHT: 235.69 LBS | BODY MASS INDEX: 35.72 KG/M2 | HEART RATE: 80 BPM | OXYGEN SATURATION: 96 % | SYSTOLIC BLOOD PRESSURE: 130 MMHG | DIASTOLIC BLOOD PRESSURE: 82 MMHG | HEIGHT: 68 IN

## 2024-12-30 DIAGNOSIS — J31.0 CHRONIC RHINITIS: ICD-10-CM

## 2024-12-30 DIAGNOSIS — E78.5 DYSLIPIDEMIA: ICD-10-CM

## 2024-12-30 DIAGNOSIS — J34.3 HYPERTROPHY OF INFERIOR NASAL TURBINATE: ICD-10-CM

## 2024-12-30 DIAGNOSIS — E11.22 TYPE 2 DIABETES MELLITUS WITH STAGE 3 CHRONIC KIDNEY DISEASE, WITHOUT LONG-TERM CURRENT USE OF INSULIN, UNSPECIFIED WHETHER STAGE 3A OR 3B CKD: Primary | ICD-10-CM

## 2024-12-30 DIAGNOSIS — R60.9 DEPENDENT EDEMA: ICD-10-CM

## 2024-12-30 DIAGNOSIS — N18.30 TYPE 2 DIABETES MELLITUS WITH STAGE 3 CHRONIC KIDNEY DISEASE, WITHOUT LONG-TERM CURRENT USE OF INSULIN, UNSPECIFIED WHETHER STAGE 3A OR 3B CKD: Primary | ICD-10-CM

## 2024-12-30 PROCEDURE — 99215 OFFICE O/P EST HI 40 MIN: CPT | Mod: S$GLB,,, | Performed by: FAMILY MEDICINE

## 2024-12-30 PROCEDURE — 3075F SYST BP GE 130 - 139MM HG: CPT | Mod: CPTII,S$GLB,, | Performed by: FAMILY MEDICINE

## 2024-12-30 PROCEDURE — 3079F DIAST BP 80-89 MM HG: CPT | Mod: CPTII,S$GLB,, | Performed by: FAMILY MEDICINE

## 2024-12-30 PROCEDURE — 3061F NEG MICROALBUMINURIA REV: CPT | Mod: CPTII,S$GLB,, | Performed by: FAMILY MEDICINE

## 2024-12-30 PROCEDURE — 3008F BODY MASS INDEX DOCD: CPT | Mod: CPTII,S$GLB,, | Performed by: FAMILY MEDICINE

## 2024-12-30 PROCEDURE — 1160F RVW MEDS BY RX/DR IN RCRD: CPT | Mod: CPTII,S$GLB,, | Performed by: FAMILY MEDICINE

## 2024-12-30 PROCEDURE — 3066F NEPHROPATHY DOC TX: CPT | Mod: CPTII,S$GLB,, | Performed by: FAMILY MEDICINE

## 2024-12-30 PROCEDURE — 99999 PR PBB SHADOW E&M-EST. PATIENT-LVL III: CPT | Mod: PBBFAC,,, | Performed by: FAMILY MEDICINE

## 2024-12-30 PROCEDURE — 1159F MED LIST DOCD IN RCRD: CPT | Mod: CPTII,S$GLB,, | Performed by: FAMILY MEDICINE

## 2024-12-30 PROCEDURE — 3044F HG A1C LEVEL LT 7.0%: CPT | Mod: CPTII,S$GLB,, | Performed by: FAMILY MEDICINE

## 2024-12-30 PROCEDURE — G2211 COMPLEX E/M VISIT ADD ON: HCPCS | Mod: S$GLB,,, | Performed by: FAMILY MEDICINE

## 2024-12-30 PROCEDURE — 4010F ACE/ARB THERAPY RXD/TAKEN: CPT | Mod: CPTII,S$GLB,, | Performed by: FAMILY MEDICINE

## 2024-12-30 RX ORDER — HYDROCHLOROTHIAZIDE 25 MG/1
25 TABLET ORAL DAILY
Qty: 30 TABLET | Refills: 11 | Status: SHIPPED | OUTPATIENT
Start: 2024-12-30 | End: 2025-12-30

## 2024-12-30 RX ORDER — FLUTICASONE PROPIONATE 50 MCG
2 SPRAY, SUSPENSION (ML) NASAL 2 TIMES DAILY
Qty: 32 G | Refills: 5 | Status: SHIPPED | OUTPATIENT
Start: 2024-12-30 | End: 2025-12-30

## 2024-12-30 NOTE — PROGRESS NOTES
CHIEF COMPLAINT:  Left foot and ankle edema in a patient with CVA X2 d/t uncontrolled hypertension, diabetes, and hypercholesterolemia.     HISTORY OF PRESENT ILLNESS: The patient is a 59 year-old black male.  Reports several months left dependant oedema likely d/t amlodipine    He was inpt and rehab for total of several months d/t CVA X2 and hyperglycemia.  Ultimately complicated with DVT and significant filomena PEs.    He has been having postprandial diarrhea with his metformin especially in the morning.    The patient has diabetes.  Recent blood sugars have been less than 200.  There have been no episodes of hypoglycemia.  Patient does routinely monitor their blood sugar.    He is on Lipitor and metformin which he is tolerating well.      He is on amlodipine, Coreg, clonidine and losartan.  Blood pressure is well controlled today.      REVIEW OF SYSTEMS:   GENERAL: No fever, chills, fatigability.   SKIN: No rashes, itching or changes in color or texture of skin.   HEAD: No headaches or recent head trauma.   EYES: Visual acuity fine. No photophobia, ocular pain or diplopia.   EARS: Denies ear pain, discharge.   NOSE: No loss of smell, no epistaxis or postnasal drip.   MOUTH & THROAT: No hoarseness or change in voice. No excessive gum bleeding.   NODES: Denies swollen glands.   CHEST: Denies GUARDADO, cyanosis, wheezing, and sputum production.   CARDIOVASCULAR: Denies chest pain, PND, orthopnea or reduced exercise tolerance.   ABDOMEN: Appetite fine. Denies diarrhea, abdominal pain, hematemesis or blood in stool.   URINARY: No flank pain, dysuria or hematuria.   PERIPHERAL VASCULAR: No claudication or cyanosis.   MUSCULOSKELETAL: No joint stiffness or swelling. Denies back pain.   NEUROLOGIC: No history of seizures.     SOCIAL HISTORY: The patient does not smoke. The patient consumes alcohol socially. The patient is . 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     PHYSICAL EXAMINATION:   Blood pressure 130/82, pulse  "80, height 5' 8" (1.727 m), weight 106.9 kg (235 lb 10.8 oz), SpO2 96%.    APPEARANCE: Well nourished, well developed, in no acute distress.   HEAD: Normocephalic, atraumatic.   EYES: PERRL. EOMI. Conjunctivae without injection and anicteric   NOSE: Mucosa pink. Airway clear.   MOUTH & THROAT: No tonsillar enlargement. No pharyngeal erythema or exudate. No stridor.   NECK: Supple.   NODES: No cervical, axillary or inguinal lymph node enlargement.   CHEST: Lungs clear to auscultation. No retractions are noted. No rales or rhonchi are present.   CARDIOVASCULAR: Normal S1, S2. No rubs, murmurs or gallops.   ABDOMEN: Bowel sounds normal. Not distended. Soft. No tenderness or masses. No ascites is noted.   MUSCULOSKELETAL: There is no clubbing, cyanosis, or edema of the extremities x4. There is full range of motion of the lumbar spine. There is full range of motion of the extremities x4. There is no deformity noted.   NEUROLOGIC:   Dysarthric speech development.   Hearing normal.   Hemiparetic gait.   Motor shows residual hemiparesis  sensory exam grossly normal.   PSYCHIATRIC: Patient is alert and oriented x3. Thought processes are all normal. There is no homicidality. There is no suicidality. There is no evidence of psychosis.     LABORATORY/RADIOLOGY: Chart reviewed, including notes and discharge summary     ASSESSMENT:   Dep oedema  Chronic constipation  Hypertension   Type 2 diabetes, condition is well controlled on current medication regimen   Hyperlipidemia, condition is well controlled on current medication regimen    PLAN:    Add HCTZ  We will update an A1c in 6 months  Return to clinic in 6 months  "

## 2025-03-24 DIAGNOSIS — Z00.00 ENCOUNTER FOR MEDICARE ANNUAL WELLNESS EXAM: ICD-10-CM

## 2025-03-29 NOTE — PROGRESS NOTES
The patient location is: [place]  The chief complaint leading to consultation is: trouble with sleep    Visit type: audiovisual     minutes of total time spent on the encounter, which includes face to face time and non-face to face time preparing to see the patient (eg, review of tests), Obtaining and/or reviewing separately obtained history, Documenting clinical information in the electronic or other health record, Independently interpreting results (not separately reported) and communicating results to the patient/family/caregiver, or Care coordination (not separately reported).     Each patient to whom he or she provides medical services by telemedicine is:  (1) informed of the relationship between the physician and patient and the respective role of any other health care provider with respect to management of the patient; and (2) notified that he or she may decline to receive medical services by telemedicine and may withdraw from such care at any time.     ESTABLISHED PATIENT VISIT    Meliton Tyson  is a pleasant 59 y.o. male established with the [facility] sleep medicine clinic    Here today for:  follow-up     Since last visit:   See assessment below    SLEEP SCHEDULE   Duration    Wind- down    Envmnt    CBTi    Meds prior    Meds now    Bed Time 1-2AM   Lights out    Latency Not long   Arousals 5:30AM -7AM   Back to sleep Takes awhile   Stim. ctrl    Wake time  10:15AM   Caffeine    Naps yes   Nocturia 2   Work                Past Medical History:   Diagnosis Date    CKD (chronic kidney disease)     Diabetes mellitus     Hypertension     Sleep apnea     Stroke      Patient Active Problem List   Diagnosis    Type 2 diabetes mellitus, without long-term current use of insulin    Acute ischemic stroke    Dyslipidemia    Weakness    Essential hypertension    Stage 3a chronic kidney disease    Stroke    Slow transit constipation    Acute deep vein thrombosis (DVT) of femoral vein of left lower extremity     Acute pulmonary embolism    Cerebrovascular accident (CVA) due to embolism    Bilateral pulmonary embolism    Urinary retention    Suspected sleep apnea    Dysarthria    Right pontine stroke    Abnormal echocardiogram    History of CVA with residual deficit    On continuous oral anticoagulation    Other chronic sinusitis    Sleep-disordered breathing       Current Outpatient Medications:     amLODIPine (NORVASC) 10 MG tablet, Take 1 tablet (10 mg total) by mouth once daily., Disp: 90 tablet, Rfl: 3    atorvastatin (LIPITOR) 80 MG tablet, Take 1 tablet (80 mg total) by mouth once daily., Disp: 90 tablet, Rfl: 3    carvediloL (COREG) 6.25 MG tablet, Take 1 tablet (6.25 mg total) by mouth 2 (two) times daily with meals., Disp: 180 tablet, Rfl: 3    cetirizine (ZYRTEC) 10 MG tablet, Take 1 tablet by mouth once daily, Disp: 90 tablet, Rfl: 3    EScitalopram oxalate (LEXAPRO) 5 MG Tab, Take 1 tablet by mouth once daily, Disp: 90 tablet, Rfl: 3    fluticasone propionate (FLONASE) 50 mcg/actuation nasal spray, 2 sprays (100 mcg total) by Each Nostril route 2 (two) times a day., Disp: 32 g, Rfl: 5    hydroCHLOROthiazide (HYDRODIURIL) 25 MG tablet, Take 1 tablet (25 mg total) by mouth once daily., Disp: 30 tablet, Rfl: 11    losartan (COZAAR) 25 MG tablet, Take 1 tablet (25 mg total) by mouth once daily. (Patient taking differently: Take 25 mg by mouth once daily. To take with the 50 mg daily to equal 75 mg daily), Disp: 90 tablet, Rfl: 3    losartan (COZAAR) 50 MG tablet, Take 50 mg by mouth once daily. To take along with 25 mg daily to equal 75 mg daily, Disp: , Rfl:     metFORMIN (GLUCOPHAGE) 500 MG tablet, TAKE 2 TABLETS BY MOUTH TWICE DAILY WITH  MEALS, Disp: 360 tablet, Rfl: 1    pantoprazole (PROTONIX) 40 MG tablet, Take 1 tablet by mouth once daily, Disp: 90 tablet, Rfl: 3       There were no vitals filed for this visit.    Physical Exam:    GEN:   Well-appearing  Psych:  Appropriate affect, demonstrates  "insight  SKIN:  No rash on the face or bridge of the nose      LABS:   No results found for: "HGB", "CO2"        RECORDS REVIEWED:      9.8.24: 29/30 x [id number], 6-12 (11.8/11.9/12), leak 0/7/40, AHI 56 (o 47)    ASSESSMENT    Significant PMH: CVA, HTN, CKD, PE, DM, BMI 34,   PROBLEM DESCRIPTION/ Sx on Presentation Interval Hx STATUS PLAN     Very severe MELONY   Presentation:   who presented in the spring of 2024 for sleep evaluation      + snoring, + witnessed  apneas    Worrel HST 6.6.24: AHI 72, RDI 73    PAP history   Dx Study    Mask FFM   DME HME   My Air    CPAP age 7.15.24: AV! compliant   PAP altn    Benefits Resting better  Less nocturia   PROBS Dry throat- has regular tube only               LOV:  9.10.24 Chas    12.3.24: 30/30 x [id number], 12-16 (15.8/15.8/16), Leak 3/19/80, AHI 48.8 (o 36.6)      VISIT DID NOT TAKE PLACE              PAP PLAN   E min 6 cwp (incr to 12)   I max 12 cwp (incr 15)   PS/epr    RAMP 5 x auto (turned off)   Other     -adjust humidity- get CCL     Altn.    If not doing well with auto-CPAP titration:  -will proceed with titration  due to elevated AHI on auto-CPAP download despite troubleshooting          The patient is using and benefitting from PAP therapy      CHECKOUT   Recall       DME    Other           Nocutira   About 2 x per night      partially controlled       -MELONY management as above       Sinus congestion   Antihistamine:   Nasal sprays: flonase  Surgeries:             controlled   -continue flonase   Other issues:                     "

## 2025-03-31 ENCOUNTER — OFFICE VISIT (OUTPATIENT)
Dept: SLEEP MEDICINE | Facility: CLINIC | Age: 60
End: 2025-03-31
Payer: MEDICARE

## 2025-03-31 DIAGNOSIS — G47.33 OSA (OBSTRUCTIVE SLEEP APNEA): Primary | ICD-10-CM

## 2025-03-31 PROCEDURE — 99499 UNLISTED E&M SERVICE: CPT | Mod: HCNC,95,, | Performed by: INTERNAL MEDICINE

## 2025-04-01 DIAGNOSIS — I63.9 ACUTE ISCHEMIC STROKE: ICD-10-CM

## 2025-04-01 DIAGNOSIS — E78.5 DYSLIPIDEMIA: ICD-10-CM

## 2025-04-01 DIAGNOSIS — I10 ESSENTIAL HYPERTENSION: ICD-10-CM

## 2025-04-01 DIAGNOSIS — E11.22 TYPE 2 DIABETES MELLITUS WITH STAGE 3 CHRONIC KIDNEY DISEASE, WITHOUT LONG-TERM CURRENT USE OF INSULIN, UNSPECIFIED WHETHER STAGE 3A OR 3B CKD: ICD-10-CM

## 2025-04-01 DIAGNOSIS — N18.30 TYPE 2 DIABETES MELLITUS WITH STAGE 3 CHRONIC KIDNEY DISEASE, WITHOUT LONG-TERM CURRENT USE OF INSULIN, UNSPECIFIED WHETHER STAGE 3A OR 3B CKD: ICD-10-CM

## 2025-04-01 RX ORDER — LOSARTAN POTASSIUM 25 MG/1
25 TABLET ORAL
Qty: 90 TABLET | Refills: 0 | Status: SHIPPED | OUTPATIENT
Start: 2025-04-01

## 2025-04-01 RX ORDER — CARVEDILOL 6.25 MG/1
6.25 TABLET ORAL 2 TIMES DAILY WITH MEALS
Qty: 180 TABLET | Refills: 2 | Status: SHIPPED | OUTPATIENT
Start: 2025-04-01

## 2025-04-01 RX ORDER — METFORMIN HYDROCHLORIDE 500 MG/1
1000 TABLET ORAL 2 TIMES DAILY WITH MEALS
Qty: 360 TABLET | Refills: 0 | Status: SHIPPED | OUTPATIENT
Start: 2025-04-01

## 2025-04-01 RX ORDER — ATORVASTATIN CALCIUM 80 MG/1
80 TABLET, FILM COATED ORAL
Qty: 90 TABLET | Refills: 0 | Status: SHIPPED | OUTPATIENT
Start: 2025-04-01

## 2025-04-01 RX ORDER — AMLODIPINE BESYLATE 10 MG/1
10 TABLET ORAL
Qty: 90 TABLET | Refills: 2 | Status: SHIPPED | OUTPATIENT
Start: 2025-04-01

## 2025-04-01 NOTE — TELEPHONE ENCOUNTER
No care due was identified.  [facility] Embedded Care Due Messages. Reference number: [id number].   4/01/2025 7:03:35 AM CDT

## 2025-04-01 NOTE — TELEPHONE ENCOUNTER
Refill Routing Note   Medication(s) are not appropriate for processing by [facility] for the following reason(s):        Required labs outdated    [facility] action(s):  Defer  Approve               Appointments  past 12m or future 3m with PCP    Date Provider   Last Visit   12/30/2024 Rodrick Angulo MD   Next Visit   4/1/2025 Rodrick Angulo MD   ED visits in past 90 days: 0        Note composed:2:06 PM 04/01/2025

## 2025-04-01 NOTE — TELEPHONE ENCOUNTER
No care due was identified.  [facility] Embedded Care Due Messages. Reference number: [id number].   4/01/2025 7:03:00 AM CDT

## 2025-04-01 NOTE — TELEPHONE ENCOUNTER
Refill Routing Note   Medication(s) are not appropriate for processing by [facility] for the following reason(s):        Drug-disease interaction    [facility] action(s):  Defer        Medication Therapy Plan: metFORMIN and Type 2 diabetes mellitus with stage 3 chronic kidney disease, without long-term current use of insulin, unspecified whether stage 3a or 3b CKD      Appointments  past 12m or future 3m with PCP    Date Provider   Last Visit   12/30/2024 Rodrick Angulo MD   Next Visit   Visit date not found Rodrick Angulo MD   ED visits in past 90 days: 0        Note composed:1:24 PM 04/01/2025

## 2025-04-02 NOTE — PROGRESS NOTES
ESTABLISHED PATIENT VISIT    Meliton Tyson  is a pleasant 59 y.o. male established with the [facility] sleep medicine clinic    Here today for:  follow-up     Since last visit:   See assessment below    SLEEP SCHEDULE   Duration    Wind- down    Envmnt    CBTi    Meds prior    Meds now    Bed Time 1-2AM   Lights out    Latency Not long   Arousals 5:30AM -7AM   Back to sleep Takes awhile   Stim. ctrl    Wake time  10:15AM   Caffeine    Naps yes   Nocturia 2   Work                Past Medical History:   Diagnosis Date    CKD (chronic kidney disease)     Diabetes mellitus     Hypertension     Sleep apnea     Stroke      Patient Active Problem List   Diagnosis    Type 2 diabetes mellitus, without long-term current use of insulin    Acute ischemic stroke    Dyslipidemia    Weakness    Essential hypertension    Stage 3a chronic kidney disease    Stroke    Slow transit constipation    Acute deep vein thrombosis (DVT) of femoral vein of left lower extremity    Acute pulmonary embolism    Cerebrovascular accident (CVA) due to embolism    Bilateral pulmonary embolism    Urinary retention    Suspected sleep apnea    Dysarthria    Right pontine stroke    Abnormal echocardiogram    History of CVA with residual deficit    On continuous oral anticoagulation    Other chronic sinusitis    Sleep-disordered breathing       Current Outpatient Medications:     atorvastatin (LIPITOR) 80 MG tablet, Take 1 tablet by mouth once daily, Disp: 90 tablet, Rfl: 0    amLODIPine (NORVASC) 10 MG tablet, Take 1 tablet by mouth once daily, Disp: 90 tablet, Rfl: 2    carvediloL (COREG) 6.25 MG tablet, TAKE 1 TABLET BY MOUTH TWICE DAILY WITH MEALS, Disp: 180 tablet, Rfl: 2    cetirizine (ZYRTEC) 10 MG tablet, Take 1 tablet by mouth once daily, Disp: 90 tablet, Rfl: 3    EScitalopram oxalate (LEXAPRO) 5 MG Tab, Take 1 tablet by mouth once daily, Disp: 90 tablet, Rfl: 3    fluticasone propionate (FLONASE) 50 mcg/actuation nasal spray, 2 sprays (100  "mcg total) by Each Nostril route 2 (two) times a day., Disp: 32 g, Rfl: 5    hydroCHLOROthiazide (HYDRODIURIL) 25 MG tablet, Take 1 tablet (25 mg total) by mouth once daily., Disp: 30 tablet, Rfl: 11    losartan (COZAAR) 25 MG tablet, Take 1 tablet by mouth once daily, Disp: 90 tablet, Rfl: 0    losartan (COZAAR) 50 MG tablet, Take 50 mg by mouth once daily. To take along with 25 mg daily to equal 75 mg daily, Disp: , Rfl:     metFORMIN (GLUCOPHAGE) 500 MG tablet, TAKE 2 TABLETS BY MOUTH TWICE DAILY WITH MEALS, Disp: 360 tablet, Rfl: 0    pantoprazole (PROTONIX) 40 MG tablet, Take 1 tablet by mouth once daily, Disp: 90 tablet, Rfl: 3       Vitals:    04/03/25 1408   BP: (!) 144/87   BP Location: Right arm   Pulse: 94   Weight: 106.9 kg (235 lb 10.8 oz)   Height: 5' 8" (1.727 m)       Physical Exam:    GEN:   Well-appearing  Psych:  Appropriate affect, demonstrates insight  SKIN:  No rash on the face or bridge of the nose      LABS:   No results found for: "HGB", "CO2"        RECORDS REVIEWED:      Worrel HST 6.6.24: AHI 72, RDI 73  9.8.24: 29/30 x [id number], 6-12 (11.8/11.9/12), leak 0/7/40, AHI 56 (o 47)    ASSESSMENT    Significant PMH: CVA, HTN, CKD, PE, DM, BMI 34,   PROBLEM DESCRIPTION/ Sx on Presentation Interval Hx STATUS PLAN     Very severe MELONY   Presentation:   who presented in the spring of 2024 for sleep evaluation      + snoring, + witnessed  apneas      PAP history   Dx Study    Mask FFM   DME HME   My Air    CPAP age 7.15.24: AV! compliant   PAP altn    Benefits Resting better  Less nocturia   PROBS Dry throat- has regular tube only                 LOV:  9.10.24 Chas    12.3.24: 30/30 x [id number], 12-16 (15.8/15.8/16), Leak 3/19/80, AHI 48.8 (o 36.6)    Wearing nightly   Sleeps better with it     Partially controlled       PAP PLAN   E min 12   I max 16   PS/epr    RAMP off   Other     -adjust humidity-   get CCL  -     Altn.      -recommend shaving  -consider mask fitting    -we discussed titration " study due to   Ineffective CPAP : AHI 36 on dowlonad at CPAP 16        The patient is using and benefitting from PAP therapy      CHECKOUT   Recall       DME    Other           Noctura   About 2 x per night   Still about 2 times    Takes fluid pill in AM   partially controlled       -MELONY management as above       Sinus congestion   Antihistamine:   Nasal sprays: flonase  Surgeries:          No congestion with flonase   controlled   -continue flonase   Other issues:

## 2025-04-03 ENCOUNTER — PATIENT MESSAGE (OUTPATIENT)
Dept: SLEEP MEDICINE | Facility: CLINIC | Age: 60
End: 2025-04-03

## 2025-04-03 ENCOUNTER — OFFICE VISIT (OUTPATIENT)
Dept: SLEEP MEDICINE | Facility: CLINIC | Age: 60
End: 2025-04-03
Payer: MEDICARE

## 2025-04-03 VITALS
HEIGHT: 68 IN | WEIGHT: 235.69 LBS | BODY MASS INDEX: 35.72 KG/M2 | DIASTOLIC BLOOD PRESSURE: 87 MMHG | SYSTOLIC BLOOD PRESSURE: 144 MMHG | HEART RATE: 94 BPM

## 2025-04-03 DIAGNOSIS — G47.33 OSA (OBSTRUCTIVE SLEEP APNEA): Primary | ICD-10-CM

## 2025-04-03 DIAGNOSIS — Z99.89 INSUFFICIENT TREATMENT WITH NASAL CPAP: ICD-10-CM

## 2025-04-03 PROCEDURE — 4010F ACE/ARB THERAPY RXD/TAKEN: CPT | Mod: HCNC,CPTII,S$GLB, | Performed by: INTERNAL MEDICINE

## 2025-04-03 PROCEDURE — 99999 PR PBB SHADOW E&M-EST. PATIENT-LVL III: CPT | Mod: PBBFAC,HCNC,, | Performed by: INTERNAL MEDICINE

## 2025-04-03 PROCEDURE — 99214 OFFICE O/P EST MOD 30 MIN: CPT | Mod: HCNC,S$GLB,, | Performed by: INTERNAL MEDICINE

## 2025-04-03 PROCEDURE — 3079F DIAST BP 80-89 MM HG: CPT | Mod: HCNC,CPTII,S$GLB, | Performed by: INTERNAL MEDICINE

## 2025-04-03 PROCEDURE — 1159F MED LIST DOCD IN RCRD: CPT | Mod: HCNC,CPTII,S$GLB, | Performed by: INTERNAL MEDICINE

## 2025-04-03 PROCEDURE — 3008F BODY MASS INDEX DOCD: CPT | Mod: HCNC,CPTII,S$GLB, | Performed by: INTERNAL MEDICINE

## 2025-04-03 PROCEDURE — 3077F SYST BP >= 140 MM HG: CPT | Mod: HCNC,CPTII,S$GLB, | Performed by: INTERNAL MEDICINE

## 2025-04-09 ENCOUNTER — TELEPHONE (OUTPATIENT)
Dept: SLEEP MEDICINE | Facility: OTHER | Age: 60
End: 2025-04-09
Payer: MEDICARE

## 2025-04-16 ENCOUNTER — HOSPITAL ENCOUNTER (OUTPATIENT)
Dept: SLEEP MEDICINE | Facility: OTHER | Age: 60
Discharge: HOME | End: 2025-04-16
Attending: INTERNAL MEDICINE
Payer: MEDICARE

## 2025-04-16 DIAGNOSIS — G47.33 OSA (OBSTRUCTIVE SLEEP APNEA): ICD-10-CM

## 2025-04-16 DIAGNOSIS — Z99.89 INSUFFICIENT TREATMENT WITH NASAL CPAP: ICD-10-CM

## 2025-04-16 PROCEDURE — 95811 POLYSOM 6/>YRS CPAP 4/> PARM: CPT | Mod: HCNC

## 2025-04-17 PROBLEM — G47.33 OSA (OBSTRUCTIVE SLEEP APNEA): Status: ACTIVE | Noted: 2025-04-17

## 2025-04-17 NOTE — PROCEDURES
"[facility]    Titration Interpretation Report    Patient Name:  Meliton Tyson  MRN#:  [id number]  :  1965  Study Date:  2025  Referring Provider:  Dilshad Doherty    The patient is a 59 year old Male who is 5' 8" and weighs 235.0 lbs.  His BMI equals 36.0.  A full night PAP titration was performed.    Polysomnogram Data  A full night polysomnogram recorded the standard physiologic parameters including EEG, EOG, EMG, EKG, nasal and oral airflow.  Respiratory parameters of chest and abdominal movements were recorded with (RIP) Respiratory Inductance Plethsmography.  Oxygen saturation was recorded by pulse oximetry.    Titration Summary  The patient was titrated at pressures ranging from 16/12/0* cm/H20 with supplemental oxygen at - up to 25/21/0* cm/H20 with supplemental oxygen at -.  The last pressure used in the study was 24/19/0* cm/H20 with supplemental oxygen at -.    Sleep Architecture  The total recording time of the polysomnogram was 361.6 minutes.  The total sleep time was 228.5 minutes.  The patient spent 18.6% of total sleep time in Stage N1, 64.1% in Stage N2, 0.0% in Stages N3, and 17.3% in REM.  Sleep latency was 67.6 minutes.  REM latency was 57.5 minutes.  Sleep Efficiency was 63.2%.  Total wake time was 133.5 minutes for a total wake percentage of 20.2%.  Wake after Sleep Onset was 65.0 minutes.    Respiratory Summary  The polysomnogram revealed a presence of 28 obstructive, 2 central, and - mixed apneas resulting in Total Apnea index of 7.9 events per hour.  There were 24 hypopneas resulting in Total Hypopnea index of 6.3 events per hour.  The combined Apnea/Hypopnea index was 14.2 events per hour.  There were a total of 7 RERA events resulting in a Respiratory Disturbance Index (RDI) of 16.0 events per hour.     Mean oxygen saturation was 95.3%.  The lowest oxygen saturation during sleep was 90.0%.  Time spent <=88% oxygen saturation was - minutes (-).    End Tidal CO2 " "during sleep ranged from - to - mmHg. End Tidal CO2 was greater than 50 mmHg for - minutes and greater than 55 mmHg for - minutes.  Transcutaneous CO2 during sleep ranged from 38.4 to 45.9 mmHg. Transcutaneous CO2 was greater than 50 mmHg for - minutes and greater than 55 mmHg for - minutes.    Limb Movement Activity  There were - limb movements recorded.  Of this total, - were classified as PLMs.  Of the PLMs, - were associated with arousals.  The Limb Movement index was - per hour while the PLM index was - per hour and PLM with arousals index was - per hour.      Cardiac: single lead EKG revealed ***normal sinus rhythm ***    PAP 4.16.25: Pt's home mask (L f20 airfit)   Rec: min ***(push to ***) *** max ***  Min Ramp: ***    Mask: ***Pt's home mask (L f20 airfit)   {TITRATION TYPE:86547}  {LIMITATIONS:04132}   {MASK:54067}  {PATIENT RESPONSE:27708}    Oxygenation:  {O2:09687}  {VENTILATION:00595}    Impression:  {IMPRESSION:71001}  {OXYGENATION:32765}  {MOTOR:88359}  {ARCHITECTURE:21228}    Recommendations:  {PRESSURE:29914}   {RAMP:87737}  {MASK:36377}  {NEW TITRATION:29123}  {OXYGEN:05111}  {OTHER:82726}  -the patient has follow up with Sleep Medicine        Dilshad Doherty MD    (This Sleep Study was interpreted by a Board Certified Sleep Specialist who conducted an epoch-by-epoch review of the entire raw data recording.)  (The indication for this sleep study was reviewed and deemed appropriate by AASM Practice Parameters or other reasons by a Board Certified Sleep Specialist.)      [facility]     Titration Report     Patient Name: Meliton Tyson Study Date: 4/16/2025   YOB: 1965 MRN #: [id number]   Age: 59 year BERTO #: -   Sex: Male Referring Provider: Dilshad Doherty   Height: 5' 8" Recording Tech: Ayah Leach RRT SDS   Weight: 235.0 lbs Scoring Tech: Meliton Amezcua RRT RPSGT   BMI: 36.0 Interpreting Physician: -   ESS: - Neck Circumference: -      Study Overview     Lights Off: " 10:49:30 PM   Count Index   Lights On: 04:51:06 AM Awakenings: 25 6.6   Time in Bed: 361.6 min. Arousals: 62 16.3   Total Sleep Time: 228.5 min. Apneas & Hypopneas: 54 14.2    Sleep Efficiency: 63.2% Limb Movements: - -   Sleep Latency: 67.6 min. Snores: - -   Wake After Sleep Onset: 65.0 min. Desaturations: 67 17.6    REM Latency from Sleep Onset: 57.5 min. Minimum SpO2 TST: 90.0%       Sleep Architecture                                                                                                                           % of Time in Bed     Stages Time (mins) % Sleep Time   Wake 133.5     Stage N1 42.5 18.6%   Stage N2 146.5 64.1%   Stage N3 0.0 0.0%   REM 39.5 17.3%         Arousal Summary       NREM REM Sleep Index   Respiratory Arousals 10 - 10 2.6   PLM Arousals - - - -   Isolated Limb Movement Arousals - - - -   Spontaneous Arousals 50 2 52 13.7   Total 60 2 62 16.3         Limb Movement Summary       Count Index   Isolated Limb Movements - -   Periodic Limb Movements (PLMs) - -   Total Limb Movements - -       Respiratory Summary       By Sleep Stage By Body Position Total    NREM REM Supine Non-Supine    Time (min) 189.0 39.5 228.5 - 228.5                 Obstructive Apnea 28 - 28 - 28   Mixed Apnea - - - - -   Central Apnea 1 1 2 - 2   Central Apnea Index 0.3 1.5 0.3 - 0.3   Total Apneas 29 1 30 - 30   Total Apnea Index 9.2 1.5 7.9 - 7.9                 Total Hypopnea 24 - 24 - 24   Total Hypopnea Index 7.6 - 6.3 - 6.3                 Apnea & Hypopnea 53 1 54 - 54   Apnea & Hypopnea Index 16.8 1.5 14.2 - 14.2                 RERAs 7 - 7 - 7   RERA Index 2.2 - 1.8 - 1.8                 RDI 19.0 1.5 16.0 - 16.0      Scoring Criteria: Hypopneas scored at 4% desaturation criteria.     Respiratory Event Durations       Apnea Hypopnea    NREM REM NREM REM   Average (seconds) 18.8 11.8 18.7 -   Maximum (seconds) 25.9 11.8 29.8 -         Oxygen Saturation Summary       Wake NREM REM TST Total   Average SpO2  95.1% 95.4% 96.1% 95.5% 95.3%   Minimum SpO2 90.0% 90.0% 94.0% 90.0% 90.0%   Maximum SpO2 99.0% 98.0% 98.0% 98.0% 99.0%      Oxygen Saturation Distribution     Range (%) Time in range (min) Time in range (%)    90.0 - 100.0 361.5 99.9%   80.0 - 90.0 0.2 0.1%   70.0 - 80.0 - -   60.0 - 70.0 - -   50.0 - 60.0 - -   0.0 - 50.0 - -   Time Spent <=88% SpO2     Range (%) Time in range (min) Time in range (%)   0.0 - 88.0 - -              Count Index   Desaturations 67 17.6           Cardiac Summary       Wake NREM REM Sleep Total   Average Pulse Rate (BPM) 82.3 79.8 88.2 81.3 81.6   Minimum Pulse Rate (BPM) 68.0 70.0 64.0 64.0 64.0   Maximum Pulse Rate (BPM) 110.0 101.0 105.0 105.0 110.0      Pulse Rate Distribution     Range (bpm) Time in range (min) Time in range (%)   0.0 - 40.0 - -   40.0 - 60.0 - -   60.0 - 80.0 179.3 49.6%   80.0 - 100.0 178.4 49.3%   100.0 - 120.0 4.0 1.1%   120.0 - 140.0 - -   140.0 - 200.0 - -      EtCO2 Summary     Stage Min (mmHg) Average (mmHg) Max (mmHg)   Wake - - -   NREM(1+2+3) - - -   REM - - -      Range (mmHg) Time in range (min) Time in range (%)   20.0 - 40.0 - -   40.0 - 50.0 - -   50.0 - 55.0 - -   55.0 - 100.0 - -   Excluded data <20.0 & >100.0 362.0 100.0%      TcCO2 Summary     Stage Min (mmHg) Average (mmHg) Max (mmHg)   Wake 34.0 40.0 45.9   NREM(1+2+3) 38.4 43.3 45.9   REM 41.1 42.7 45.0      Range (mmHg) Time in range (min) Time in range (%)   20.0 - 40.0 81.4 22.5%   40.0 - 50.0 279.9 77.3%   50.0 - 55.0 - -   55.0 - 100.0 - -   Excluded data <20.0 & >100.0 0.7 0.2%      Comments     -         Titration Summary     PAP Device PAP Level O2 Level Time (min) TST (min) NREM (min) REM (min) Wake (min) Sleep Eff% OA# CA# MA# Hyp# AHI RERA RDI Min SpO2 SpO2 <=88% (min) Ar. Index   BiLevel 16/12/0 - 76.5 7.5 7.5 0.0 69.0 9.8% 2 - - 2 32.0 6 80.0 92.0  0.0 80.0   BiLevel 17/13/0 - 34.0 32.0 32.0 0.0 2.0 94.1% 5 - - 3 15.0 - 15.0 90.0  0.0 3.8   BiLevel 18/14/0 - 5.5 5.5 5.5 0.0 0.0  100.0% 2 - - 2 43.6 1 54.5 91.0  0.0 21.8   BiLevel 19/15/0 - 31.5 20.5 13.0 7.5 11.0 65.1% 1 2 - 2 14.6 - 14.6 91.0  0.0 2.9   BiLevel 20/16/0 - 35.0 31.5 31.5 0.0 3.5 90.0% 3 - - 6 17.1 - 17.1 91.0  0.0 15.2   BiLevel 21/17/0 - 8.0 8.0 8.0 0.0 0.0 100.0% - - - 4 30.0 - 30.0 91.0  0.0 30.0   BiLevel 22/18/0 - 26.5 15.0 5.5 9.5 11.5 56.6% 3 - - 1 16.0 - 16.0 91.0  0.0 12.0   BiLevel 23/18/0 - 3.0 2.5 2.5 0.0 0.5 83.3% - - - - - - - 90.0  0.0 48.0   BiLevel 23/19/0 - 14.0 12.5 12.5 0.0 1.5 89.3% 12 - - 4 76.8 - 76.8 92.0  0.0 67.2   BiLevel 24/18/0 - 23.5 12.0 4.0 8.0 11.5 51.1% - - - - - - - 92.0  0.0 15.0   BiLevel 24/19/0 - 66.5 49.5 35.0 14.5 17.0 74.4% - - - - - - - 93.0  0.0 2.4   BiLevel 24/20/0 - 17.0 12.0 12.0 0.0 5.0 70.6% - - - - - - - 92.0  0.0 30.0   BiLevel 25/21/0 - 21.0 20.0 20.0 0.0 1.0 95.2% - - - - - - - 91.0  0.0 15.0

## 2025-04-17 NOTE — PROGRESS NOTES
An overnight bipap titration study was performed on  59 year old male, Meliton yTson. The following was explained to the pt prior to the study: the time to bed and wake time, the set-up process timeframe and the purpose of each sensor, the reason (if sensors fall off) the technician will need to enter the room during the night, and how to call out for assistance during the night. A post-study letter was handed to the pt in the morning.     Mask used: Pt's home mask (L f20 airfit)

## 2025-04-29 ENCOUNTER — PATIENT MESSAGE (OUTPATIENT)
Dept: SLEEP MEDICINE | Facility: CLINIC | Age: 60
End: 2025-04-29

## 2025-04-29 DIAGNOSIS — G47.33 OSA (OBSTRUCTIVE SLEEP APNEA): ICD-10-CM

## 2025-04-29 DIAGNOSIS — Z99.89 INSUFFICIENT TREATMENT WITH NASAL CPAP: Primary | ICD-10-CM

## 2025-05-04 ENCOUNTER — PATIENT MESSAGE (OUTPATIENT)
Dept: INTERNAL MEDICINE | Facility: CLINIC | Age: 60
End: 2025-05-04
Payer: MEDICARE

## 2025-05-04 DIAGNOSIS — I10 ESSENTIAL HYPERTENSION: ICD-10-CM

## 2025-05-05 RX ORDER — LOSARTAN POTASSIUM 50 MG/1
50 TABLET ORAL DAILY
Qty: 90 TABLET | Refills: 3 | Status: SHIPPED | OUTPATIENT
Start: 2025-05-05

## 2025-05-05 NOTE — TELEPHONE ENCOUNTER
No care due was identified.  [facility] Embedded Care Due Messages. Reference number: [id number].   5/05/2025 3:32:32 PM CDT

## 2025-05-05 NOTE — TELEPHONE ENCOUNTER
Refill Encounter    PCP Visits: Recent Visits  Date Type Provider Dept   12/30/24 Office Visit Rodrick Angulo MD [facility] Internal Medicine   Showing recent visits within past 360 days and meeting all other requirements  Future Appointments  No visits were found meeting these conditions.  Showing future appointments within next 720 days and meeting all other requirements      Last 3 Blood Pressure:   BP Readings from Last 3 Encounters:   04/03/25 (!) 144/87   12/30/24 130/82   09/03/24 (!) 138/91     Preferred Pharmacy:   [facility] [place] - [address]  [address] [place]) [place] [address]  Phone: [phone] Fax: [phone]    Requested RX:  Requested Prescriptions     Pending Prescriptions Disp Refills    losartan (COZAAR) 50 MG tablet 90 tablet 3     Sig: Take 1 tablet (50 mg total) by mouth once daily. To take along with 25 mg daily to equal 75 mg daily      RX Route: Normal

## 2025-06-12 ENCOUNTER — RESULTS FOLLOW-UP (OUTPATIENT)
Dept: INTERNAL MEDICINE | Facility: CLINIC | Age: 60
End: 2025-06-12

## 2025-06-12 ENCOUNTER — OFFICE VISIT (OUTPATIENT)
Dept: INTERNAL MEDICINE | Facility: CLINIC | Age: 60
End: 2025-06-12
Payer: MEDICARE

## 2025-06-12 ENCOUNTER — CLINICAL SUPPORT (OUTPATIENT)
Dept: INTERNAL MEDICINE | Facility: CLINIC | Age: 60
End: 2025-06-12
Payer: MEDICARE

## 2025-06-12 ENCOUNTER — LAB VISIT (OUTPATIENT)
Dept: LAB | Facility: OTHER | Age: 60
End: 2025-06-12
Payer: MEDICARE

## 2025-06-12 VITALS
HEIGHT: 68 IN | DIASTOLIC BLOOD PRESSURE: 82 MMHG | OXYGEN SATURATION: 96 % | WEIGHT: 230.81 LBS | SYSTOLIC BLOOD PRESSURE: 134 MMHG | BODY MASS INDEX: 34.98 KG/M2 | HEART RATE: 74 BPM

## 2025-06-12 DIAGNOSIS — I69.30 HISTORY OF CVA WITH RESIDUAL DEFICIT: ICD-10-CM

## 2025-06-12 DIAGNOSIS — N18.30 TYPE 2 DIABETES MELLITUS WITH STAGE 3 CHRONIC KIDNEY DISEASE, WITHOUT LONG-TERM CURRENT USE OF INSULIN, UNSPECIFIED WHETHER STAGE 3A OR 3B CKD: ICD-10-CM

## 2025-06-12 DIAGNOSIS — I10 ESSENTIAL HYPERTENSION: ICD-10-CM

## 2025-06-12 DIAGNOSIS — E11.22 TYPE 2 DIABETES MELLITUS WITH STAGE 3 CHRONIC KIDNEY DISEASE, WITHOUT LONG-TERM CURRENT USE OF INSULIN, UNSPECIFIED WHETHER STAGE 3A OR 3B CKD: ICD-10-CM

## 2025-06-12 DIAGNOSIS — I69.354 HEMIPARESIS AFFECTING LEFT SIDE AS LATE EFFECT OF CEREBROVASCULAR ACCIDENT: ICD-10-CM

## 2025-06-12 DIAGNOSIS — G47.33 OSA (OBSTRUCTIVE SLEEP APNEA): ICD-10-CM

## 2025-06-12 DIAGNOSIS — E66.01 SEVERE OBESITY (BMI 35.0-39.9) WITH COMORBIDITY: ICD-10-CM

## 2025-06-12 DIAGNOSIS — E78.5 DYSLIPIDEMIA: ICD-10-CM

## 2025-06-12 DIAGNOSIS — Z00.00 ENCOUNTER FOR MEDICARE ANNUAL WELLNESS EXAM: Primary | ICD-10-CM

## 2025-06-12 DIAGNOSIS — Z74.09 OTHER REDUCED MOBILITY: ICD-10-CM

## 2025-06-12 PROBLEM — I82.412 ACUTE DEEP VEIN THROMBOSIS (DVT) OF FEMORAL VEIN OF LEFT LOWER EXTREMITY: Status: RESOLVED | Noted: 2021-07-15 | Resolved: 2025-06-12

## 2025-06-12 PROBLEM — I26.99 BILATERAL PULMONARY EMBOLISM: Status: RESOLVED | Noted: 2021-08-05 | Resolved: 2025-06-12

## 2025-06-12 PROBLEM — I26.99 ACUTE PULMONARY EMBOLISM: Status: RESOLVED | Noted: 2021-08-04 | Resolved: 2025-06-12

## 2025-06-12 PROBLEM — I63.9 CEREBROVASCULAR ACCIDENT (CVA) DUE TO EMBOLISM: Status: RESOLVED | Noted: 2021-08-04 | Resolved: 2025-06-12

## 2025-06-12 PROBLEM — I63.9 ACUTE ISCHEMIC STROKE: Status: RESOLVED | Noted: 2021-06-03 | Resolved: 2025-06-12

## 2025-06-12 PROBLEM — I63.50 RIGHT PONTINE STROKE: Status: RESOLVED | Noted: 2022-06-16 | Resolved: 2025-06-12

## 2025-06-12 PROBLEM — I63.9 STROKE: Status: RESOLVED | Noted: 2021-07-08 | Resolved: 2025-06-12

## 2025-06-12 PROBLEM — Z79.01 ON CONTINUOUS ORAL ANTICOAGULATION: Status: RESOLVED | Noted: 2024-04-04 | Resolved: 2025-06-12

## 2025-06-12 LAB
CHOLEST SERPL-MCNC: 118 MG/DL (ref 120–199)
CHOLEST/HDLC SERPL: 4.9 {RATIO} (ref 2–5)
EAG (OHS): 134 MG/DL (ref 68–131)
HBA1C MFR BLD: 6.3 % (ref 4–5.6)
HDLC SERPL-MCNC: 24 MG/DL (ref 40–75)
HDLC SERPL: 20.3 % (ref 20–50)
LDLC SERPL CALC-MCNC: 74.2 MG/DL (ref 63–159)
NONHDLC SERPL-MCNC: 94 MG/DL
TRIGL SERPL-MCNC: 99 MG/DL (ref 30–150)

## 2025-06-12 PROCEDURE — 82465 ASSAY BLD/SERUM CHOLESTEROL: CPT

## 2025-06-12 PROCEDURE — 83036 HEMOGLOBIN GLYCOSYLATED A1C: CPT

## 2025-06-12 PROCEDURE — 36415 COLL VENOUS BLD VENIPUNCTURE: CPT

## 2025-06-12 PROCEDURE — 99999 PR PBB SHADOW E&M-EST. PATIENT-LVL IV: CPT | Mod: PBBFAC,,,

## 2025-06-12 PROCEDURE — 92228 IMG RTA DETC/MNTR DS PHY/QHP: CPT | Mod: 26,S$GLB,, | Performed by: OPTOMETRIST

## 2025-06-12 PROCEDURE — 2025F 7 FLD RTA PHOTO W/O RTNOPTHY: CPT | Mod: CPTII,S$GLB,, | Performed by: OPTOMETRIST

## 2025-06-12 NOTE — PROGRESS NOTES
"  Meliton Tyson presented for an initial Medicare AWV today. The following components were reviewed and updated:    Medical history  Family History  Social history  Allergies and Current Medications  Health Risk Assessment  Health Maintenance  Care Team    **See Completed Assessments for Annual Wellness visit with in the encounter summary    The following assessments were completed:  Depression Screening  Cognitive function Screening  Timed Get Up Test  Whisper Test      Opioid documentation:      Patient does not have a current opioid prescription.          Vitals:    06/12/25 1012   BP: 134/82   Pulse: 74   SpO2: 96%   Weight: 104.7 kg (230 lb 13.2 oz)   Height: 5' 8" (1.727 m)     Body mass index is 35.1 kg/m².       Physical Exam  Constitutional:       Appearance: Normal appearance.   HENT:      Head: Normocephalic and atraumatic.   Cardiovascular:      Rate and Rhythm: Normal rate.   Pulmonary:      Effort: Pulmonary effort is normal.   Abdominal:      General: Abdomen is flat.      Palpations: Abdomen is soft.   Feet:      Right foot:      Protective Sensation: 10 sites tested.  10 sites sensed.      Skin integrity: Dry skin present.      Left foot:      Protective Sensation: 10 sites tested.  10 sites sensed.      Skin integrity: Dry skin present.   Skin:     General: Skin is warm and dry.   Neurological:      General: No focal deficit present.      Mental Status: He is alert.   Psychiatric:         Mood and Affect: Mood normal.           Diagnoses and health risks identified today and associated recommendations/orders:  Encounter for Medicare annual wellness exam  -     Referral to Enhanced Annual Wellness Visit (eAWV) W+1    Hemiparesis affecting left side as late effect of cerebrovascular accident  Comments:  Pt uses walker and has L sided weakness.    Type 2 diabetes mellitus with stage 3 chronic kidney disease, without long-term current use of insulin, unspecified whether stage 3a or 3b " CKD  Comments:  Metformin 1000 mg BID, A1c 6.4 1/24. will get updated labs.  Orders:  -     Diabetic Eye Screening Photo; Future  -     Hemoglobin A1C; Future; Expected date: 06/12/2025  -     Lipid Panel; Future; Expected date: 06/12/2025  -     Microalbumin/Creatinine Ratio, Urine; Future; Expected date: 06/12/2025    Severe obesity (BMI 35.0-39.9) with comorbidity  Comments:  BMI 35.1. Discussed diet and exercise. Pt limited by L sided hemiparesis.    History of CVA with residual deficit  Comments:  Stable. L sided weakness, uses walker.    Essential hypertension  Comments:  Controlled. Carvedilol 6.25 BID, losartan 75 daily, amlodipine 10 daily.    Dyslipidemia  Comments:  controlld, atorvastatin 80 daily. will get updated labs. last labs 1/24  Orders:  -     Lipid Panel; Future; Expected date: 06/12/2025    MELONY (obstructive sleep apnea)  Comments:  controlled. Uses CPAP with good result.    Other reduced mobility  Comments:  uses walker 2/2 L hemipalegia 2/2 history of CVA.         Provided Meliton with a 5-10 year written screening schedule and personal prevention plan. Recommendations were developed using the USPSTF age appropriate recommendations. Education, counseling, and referrals were provided as needed.  After Visit Summary printed and given to patient which includes a list of additional screenings\tests needed.    No follow-ups on file.      Andrew Boyd NP

## 2025-06-12 NOTE — PATIENT INSTRUCTIONS
Counseling and Referral of Other Preventative  (Italic type indicates deductible and co-insurance are waived)    Patient Name: Meliton Tyson  Today's Date: 6/12/2025    Health Maintenance       Date Due Completion Date    Pneumococcal Vaccines (Age 50+) (1 of 2 - PCV) Never done ---    Shingles Vaccine (1 of 2) Never done ---    Diabetic Eye Exam 05/18/2020 5/18/2019    COVID-19 Vaccine (3 - 2024-25 season) 09/01/2024 4/17/2021    Lipid Panel 01/12/2025 1/12/2024    Diabetes Urine Screening 01/15/2025 1/15/2024    Foot Exam 05/13/2025 5/13/2024    Hemoglobin A1c 05/13/2025 11/13/2024    Influenza Vaccine (Season Ended) 09/01/2025 ---    High Dose Statin 04/03/2026 4/3/2025    TETANUS VACCINE 08/18/2026 8/18/2016    Colorectal Cancer Screening 05/29/2034 5/29/2024    RSV Vaccine (Age 60+ and Pregnant patients) (1 - 1-dose 75+ series) 06/16/2042 ---        No orders of the defined types were placed in this encounter.      The following information is provided to all patients.  This information is to help you find resources for any of the problems found today that may be affecting your health:                  Living healthy guide: www.[facility].[place].gov      Understanding Diabetes: www.diabetes.org      Eating healthy: www.cdc.gov/healthyweight      CDC home safety checklist: www.cdc.gov/steadi/patient.html      Agency on Aging: www.goea.[place].[facility]      Alcoholics anonymous (AA): www.aa.org      Physical Activity: www.jackelyn.nih.gov/fn4lozm      Tobacco use: www.quitwithusla.org

## 2025-06-12 NOTE — PROGRESS NOTES
Meliton Tyson is a 59 y.o. male here for a diabetic eye screening with non-dilated fundus photos per Dr. Angulo.    Patient cooperative?: Yes  Small pupils?: No  Last eye exam: 2024    For exam results, see Encounter Report.

## 2025-06-30 DIAGNOSIS — E11.22 TYPE 2 DIABETES MELLITUS WITH STAGE 3 CHRONIC KIDNEY DISEASE, WITHOUT LONG-TERM CURRENT USE OF INSULIN, UNSPECIFIED WHETHER STAGE 3A OR 3B CKD: ICD-10-CM

## 2025-06-30 DIAGNOSIS — N18.30 TYPE 2 DIABETES MELLITUS WITH STAGE 3 CHRONIC KIDNEY DISEASE, WITHOUT LONG-TERM CURRENT USE OF INSULIN, UNSPECIFIED WHETHER STAGE 3A OR 3B CKD: ICD-10-CM

## 2025-06-30 DIAGNOSIS — E78.5 DYSLIPIDEMIA: ICD-10-CM

## 2025-06-30 DIAGNOSIS — I63.9 ACUTE ISCHEMIC STROKE: ICD-10-CM

## 2025-06-30 RX ORDER — ATORVASTATIN CALCIUM 80 MG/1
80 TABLET, FILM COATED ORAL
Qty: 90 TABLET | Refills: 0 | Status: SHIPPED | OUTPATIENT
Start: 2025-06-30

## 2025-06-30 RX ORDER — METFORMIN HYDROCHLORIDE 500 MG/1
1000 TABLET ORAL 2 TIMES DAILY WITH MEALS
Qty: 360 TABLET | Refills: 0 | Status: SHIPPED | OUTPATIENT
Start: 2025-06-30

## 2025-06-30 NOTE — TELEPHONE ENCOUNTER
Refill Routing Note   Medication(s) are not appropriate for processing by [facility] for the following reason(s):        Required labs outdated    [facility] action(s):  Defer               Appointments  past 12m or future 3m with PCP    Date Provider   Last Visit   12/30/2024 Rodrick Angulo MD   Next Visit   Visit date not found Rodrick Angulo MD   ED visits in past 90 days: 0        Note composed:5:19 PM 06/30/2025

## 2025-07-23 ENCOUNTER — LAB VISIT (OUTPATIENT)
Dept: LAB | Facility: OTHER | Age: 60
End: 2025-07-23
Attending: FAMILY MEDICINE
Payer: MEDICARE

## 2025-07-23 DIAGNOSIS — E11.22 TYPE 2 DIABETES MELLITUS WITH STAGE 3 CHRONIC KIDNEY DISEASE, WITHOUT LONG-TERM CURRENT USE OF INSULIN, UNSPECIFIED WHETHER STAGE 3A OR 3B CKD: ICD-10-CM

## 2025-07-23 DIAGNOSIS — N18.30 TYPE 2 DIABETES MELLITUS WITH STAGE 3 CHRONIC KIDNEY DISEASE, WITHOUT LONG-TERM CURRENT USE OF INSULIN, UNSPECIFIED WHETHER STAGE 3A OR 3B CKD: ICD-10-CM

## 2025-07-23 LAB
ALBUMIN/CREAT UR: 8.1 UG/MG
CREAT UR-MCNC: 173 MG/DL (ref 23–375)
MICROALBUMIN UR-MCNC: 14 UG/ML (ref ?–5000)

## 2025-07-23 PROCEDURE — 82043 UR ALBUMIN QUANTITATIVE: CPT | Mod: HCNC

## 2025-07-30 DIAGNOSIS — I10 ESSENTIAL HYPERTENSION: ICD-10-CM

## 2025-07-30 RX ORDER — LOSARTAN POTASSIUM 25 MG/1
25 TABLET ORAL
Qty: 90 TABLET | Refills: 0 | Status: SHIPPED | OUTPATIENT
Start: 2025-07-30

## 2025-07-30 NOTE — TELEPHONE ENCOUNTER
Refill Routing Note   Medication(s) are not appropriate for processing by [facility] for the following reason(s):        Required labs outdated  Responsible provider unclear    [facility] action(s):  Defer               Appointments  past 12m or future 3m with PCP    Date Provider   Last Visit   12/30/2024 Rodrick Angulo MD   Next Visit   Visit date not found Rodrick Angulo MD   ED visits in past 90 days: 0        Note composed:1:09 PM 07/30/2025

## 2025-07-30 NOTE — TELEPHONE ENCOUNTER
Care Due:                  Date            Visit Type   Department     Provider  --------------------------------------------------------------------------------                                EP -                              PRIMARY      [facility] INTERNAL  Rodrick Curtis  Last Visit: 12-      CARE ([facility])   [facility]  Next Visit: None Scheduled  None         None Found                                                            Last  Test          Frequency    Reason                     Performed    Due Date  --------------------------------------------------------------------------------    CMP.........  12 months..  atorvastatin,              04- 03-                             hydroCHLOROthiazide,                             losartan, metFORMIN......    [employer] Embedded Care Due Messages. Reference number: [id number].   7/30/2025 7:01:45 AM CDT

## 2025-08-26 ENCOUNTER — PATIENT OUTREACH (OUTPATIENT)
Dept: ADMINISTRATIVE | Facility: HOSPITAL | Age: 60
End: 2025-08-26
Payer: MEDICARE